# Patient Record
Sex: MALE | Race: WHITE
[De-identification: names, ages, dates, MRNs, and addresses within clinical notes are randomized per-mention and may not be internally consistent; named-entity substitution may affect disease eponyms.]

---

## 2019-03-18 ENCOUNTER — HOSPITAL ENCOUNTER (OUTPATIENT)
Dept: HOSPITAL 8 - RAD | Age: 69
Discharge: HOME | End: 2019-03-18
Attending: OTOLARYNGOLOGY
Payer: MEDICARE

## 2019-03-18 DIAGNOSIS — J32.0: ICD-10-CM

## 2019-03-18 DIAGNOSIS — J32.3: ICD-10-CM

## 2019-03-18 DIAGNOSIS — J32.2: Primary | ICD-10-CM

## 2019-03-18 DIAGNOSIS — J34.2: ICD-10-CM

## 2019-03-18 PROCEDURE — 70486 CT MAXILLOFACIAL W/O DYE: CPT

## 2019-03-19 ENCOUNTER — HOSPITAL ENCOUNTER (OUTPATIENT)
Dept: HOSPITAL 8 - OUT | Age: 69
Discharge: HOME | End: 2019-03-19
Attending: OTOLARYNGOLOGY
Payer: MEDICARE

## 2019-03-19 VITALS — HEIGHT: 75 IN | WEIGHT: 203.71 LBS | BODY MASS INDEX: 25.33 KG/M2

## 2019-03-19 VITALS — SYSTOLIC BLOOD PRESSURE: 114 MMHG | DIASTOLIC BLOOD PRESSURE: 64 MMHG

## 2019-03-19 DIAGNOSIS — J32.2: ICD-10-CM

## 2019-03-19 DIAGNOSIS — G47.33: ICD-10-CM

## 2019-03-19 DIAGNOSIS — Z95.0: ICD-10-CM

## 2019-03-19 DIAGNOSIS — Z72.89: ICD-10-CM

## 2019-03-19 DIAGNOSIS — E03.9: ICD-10-CM

## 2019-03-19 DIAGNOSIS — R04.0: ICD-10-CM

## 2019-03-19 DIAGNOSIS — J34.2: Primary | ICD-10-CM

## 2019-03-19 DIAGNOSIS — Z85.89: ICD-10-CM

## 2019-03-19 DIAGNOSIS — J32.0: ICD-10-CM

## 2019-03-19 PROCEDURE — 88304 TISSUE EXAM BY PATHOLOGIST: CPT

## 2019-03-19 PROCEDURE — 88311 DECALCIFY TISSUE: CPT

## 2019-03-19 PROCEDURE — 93005 ELECTROCARDIOGRAM TRACING: CPT

## 2019-03-19 PROCEDURE — 31256 EXPLORATION MAXILLARY SINUS: CPT

## 2019-03-19 PROCEDURE — 30920 LIGATION UPPER JAW ARTERY: CPT

## 2019-03-19 PROCEDURE — 31241 NSL/SNS NDSC LIG SPHNPTN ART: CPT

## 2019-03-19 PROCEDURE — 30520 REPAIR OF NASAL SEPTUM: CPT

## 2019-03-19 PROCEDURE — 31254 NSL/SINS NDSC W/PRTL ETHMDCT: CPT

## 2019-03-19 RX ADMIN — FENTANYL CITRATE PRN MCG: 50 INJECTION INTRAMUSCULAR; INTRAVENOUS at 12:16

## 2019-03-19 RX ADMIN — FENTANYL CITRATE PRN MCG: 50 INJECTION INTRAMUSCULAR; INTRAVENOUS at 12:31

## 2019-11-25 ENCOUNTER — OFFICE VISIT (OUTPATIENT)
Dept: URGENT CARE | Facility: CLINIC | Age: 69
End: 2019-11-25
Payer: MEDICARE

## 2019-11-25 VITALS
OXYGEN SATURATION: 96 % | RESPIRATION RATE: 16 BRPM | HEART RATE: 77 BPM | SYSTOLIC BLOOD PRESSURE: 112 MMHG | WEIGHT: 214 LBS | DIASTOLIC BLOOD PRESSURE: 62 MMHG | BODY MASS INDEX: 26.61 KG/M2 | TEMPERATURE: 99.5 F | HEIGHT: 75 IN

## 2019-11-25 DIAGNOSIS — Z86.79 HISTORY OF ATRIAL FIBRILLATION: ICD-10-CM

## 2019-11-25 DIAGNOSIS — J40 BRONCHITIS: ICD-10-CM

## 2019-11-25 PROCEDURE — 99203 OFFICE O/P NEW LOW 30 MIN: CPT | Performed by: PHYSICIAN ASSISTANT

## 2019-11-25 RX ORDER — CODEINE PHOSPHATE AND GUAIFENESIN 10; 100 MG/5ML; MG/5ML
5 SOLUTION ORAL NIGHTLY PRN
Qty: 50 ML | Refills: 0 | Status: SHIPPED | OUTPATIENT
Start: 2019-11-25 | End: 2019-12-05

## 2019-11-25 RX ORDER — DOXYCYCLINE HYCLATE 100 MG
100 TABLET ORAL 2 TIMES DAILY
Qty: 14 TAB | Refills: 0 | Status: SHIPPED | OUTPATIENT
Start: 2019-11-25 | End: 2019-12-02

## 2019-11-25 RX ORDER — TEMAZEPAM 30 MG/1
30 CAPSULE ORAL NIGHTLY PRN
COMMUNITY
End: 2020-02-23

## 2019-11-25 RX ORDER — AMIODARONE HYDROCHLORIDE 200 MG/1
TABLET ORAL
Refills: 1 | COMMUNITY
Start: 2019-11-18 | End: 2021-03-17

## 2019-11-25 RX ORDER — FUROSEMIDE 40 MG/1
40 TABLET ORAL DAILY
Refills: 2 | COMMUNITY
Start: 2019-10-21 | End: 2022-09-09 | Stop reason: SDUPTHER

## 2019-11-25 RX ORDER — LEVOTHYROXINE SODIUM 0.07 MG/1
75 TABLET ORAL
COMMUNITY
End: 2022-05-05

## 2019-11-25 RX ORDER — ENALAPRIL MALEATE 2.5 MG/1
2.5 TABLET ORAL DAILY
Refills: 5 | COMMUNITY
Start: 2019-09-15 | End: 2021-08-27

## 2019-11-25 ASSESSMENT — ENCOUNTER SYMPTOMS: COUGH: 1

## 2019-11-25 NOTE — PROGRESS NOTES
"Subjective:      Pavan Cueva is a 69 y.o. male who presents with Cough and Nasal Congestion            Cough   This is a new problem. The current episode started in the past 7 days (5 days ago). The problem has been waxing and waning. The problem occurs every few minutes. The cough is productive of sputum. Associated symptoms include myalgias, nasal congestion, postnasal drip, rhinorrhea, a sore throat and wheezing. Pertinent negatives include no chest pain, chills, ear pain, eye redness or fever. He has tried OTC cough suppressant for the symptoms. The treatment provided mild relief. His past medical history is significant for bronchitis.       Review of Systems   Constitutional: Negative for chills and fever.   HENT: Positive for congestion, postnasal drip, rhinorrhea and sore throat. Negative for ear pain.    Eyes: Negative for discharge and redness.   Respiratory: Positive for cough and wheezing.    Cardiovascular: Negative for chest pain and leg swelling.   Gastrointestinal: Negative for abdominal pain and diarrhea.   Musculoskeletal: Positive for myalgias.   All other systems reviewed and are negative.         Objective:     /62 (BP Location: Right arm, Patient Position: Sitting)   Pulse 77   Temp 37.5 °C (99.5 °F)   Resp 16   Ht 1.905 m (6' 3\")   Wt 97.1 kg (214 lb)   SpO2 96%   BMI 26.75 kg/m²    PMH: hx of bronchitis, a. Fib.   MEDS: Reviewed .   ALLERGIES: No Known Allergies  SURGHX: History reviewed. No pertinent surgical history.  SOCHX:  reports that he has never smoked. He has never used smokeless tobacco. He reports current alcohol use. He reports that he does not use drugs.  FH: Family history was reviewed, no pertinent findings to report    Physical Exam  Vitals signs reviewed.   Constitutional:       Appearance: Normal appearance. He is well-developed.   HENT:      Head: Normocephalic and atraumatic.      Ears:      Comments: Bilateral clear middle ear effusions.     Nose:      " Comments: Rhinorrhea noted.     Mouth/Throat:      Comments: Posterior oropharynx is erythematous, positive postnasal drainage.  No evidence of exudate.  Eyes:      Conjunctiva/sclera: Conjunctivae normal.      Pupils: Pupils are equal, round, and reactive to light.   Neck:      Musculoskeletal: Normal range of motion and neck supple.   Cardiovascular:      Rate and Rhythm: Normal rate and regular rhythm.      Heart sounds: No murmur.   Pulmonary:      Effort: Pulmonary effort is normal. No respiratory distress.      Breath sounds: Normal breath sounds.      Comments: Harsh bronchial cough throughout the duration of the visit.  Musculoskeletal: Normal range of motion.      Right lower leg: No edema.      Left lower leg: No edema.   Lymphadenopathy:      Cervical: No cervical adenopathy.   Skin:     General: Skin is warm.      Findings: No rash.   Neurological:      Mental Status: He is alert and oriented to person, place, and time.   Psychiatric:         Mood and Affect: Mood normal.         Behavior: Behavior normal.         Thought Content: Thought content normal.                 Assessment/Plan:       1. Bronchitis  - doxycycline (VIBRAMYCIN) 100 MG Tab; Take 1 Tab by mouth 2 times a day for 7 days.  Dispense: 14 Tab; Refill: 0  - guaifenesin-codeine (ROBITUSSIN AC) Solution oral solution; Take 5 mL by mouth at bedtime as needed for Cough (May cause sedation) for up to 10 days.  Dispense: 50 mL; Refill: 0    2. History of atrial fibrillation    NARXCHECK was reviewed by myself-  Document does not reveal any concerning patterns. Pt. was advised to avoid the operation of heavy machine along with driving while on such medications. Finally pt. was advised to use medication only as prescribed.   Discussed side effects of the above meds.    Encouraged OTC supportive meds PRN. Humidification, increase fluids, avoid night time dairy.   Patient given precautionary s/sx that mandate immediate follow up and evaluation in  the ED. Advised of risks of not doing so.    DDX, Supportive care, and indications for immediate follow-up discussed with patient.    Instructed to return to clinic or nearest emergency department if we are not available for any change in condition, further concerns, or worsening of symptoms.    The patient demonstrated a good understanding and agreed with the treatment plan.    Please note that this dictation was created using voice recognition software. I have made every reasonable attempt to correct obvious errors, but I expect that there are errors of grammar and possibly content that I did not discover before finalizing the note.

## 2019-12-01 ASSESSMENT — ENCOUNTER SYMPTOMS
EYE DISCHARGE: 0
MYALGIAS: 1
DIARRHEA: 0
CHILLS: 0
RHINORRHEA: 1
ABDOMINAL PAIN: 0
FEVER: 0
EYE REDNESS: 0
SORE THROAT: 1
WHEEZING: 1

## 2020-02-23 ENCOUNTER — OFFICE VISIT (OUTPATIENT)
Dept: URGENT CARE | Facility: CLINIC | Age: 70
End: 2020-02-23
Payer: MEDICARE

## 2020-02-23 VITALS
SYSTOLIC BLOOD PRESSURE: 96 MMHG | HEART RATE: 77 BPM | DIASTOLIC BLOOD PRESSURE: 56 MMHG | WEIGHT: 209 LBS | TEMPERATURE: 98.6 F | OXYGEN SATURATION: 96 % | BODY MASS INDEX: 25.99 KG/M2 | HEIGHT: 75 IN | RESPIRATION RATE: 16 BRPM

## 2020-02-23 DIAGNOSIS — L04.0 ACUTE LYMPHADENITIS OF NECK: ICD-10-CM

## 2020-02-23 PROCEDURE — 99214 OFFICE O/P EST MOD 30 MIN: CPT | Performed by: FAMILY MEDICINE

## 2020-02-23 RX ORDER — AMOXICILLIN AND CLAVULANATE POTASSIUM 875; 125 MG/1; MG/1
TABLET, FILM COATED ORAL
Qty: 20 TAB | Refills: 0 | Status: SHIPPED | OUTPATIENT
Start: 2020-02-23 | End: 2021-03-17

## 2020-02-23 NOTE — PROGRESS NOTES
Chief Complaint:    Chief Complaint   Patient presents with   • Bump     lump on the neck, flaring up at night time started 3 days ago       History of Present Illness:    This is a new problem. He has swollen gland right side of neck, it is in region of submandibular gland, that he noticed on 2/21/2020. He has Amoxil to take 4 pills at a time prior to dental procedure and he took 3 rounds of this and each time he feels the antibiotic significantly helped the swelling go down some. There is no pain in this swollen gland. He is wondering if he should be on a different antibiotic for this problem.      Review of Systems:    Constitutional: Negative for fever, chills, and diaphoresis.   Eyes: Negative for change in vision, photophobia, pain, redness, and discharge.  ENT: Negative for ear pain, ear discharge, hearing loss, tinnitus, nasal congestion, nosebleeds, and sore throat.    Respiratory: Negative for cough, hemoptysis, sputum production, shortness of breath, wheezing, and stridor.    Cardiovascular: Negative for chest pain, palpitations, orthopnea, claudication, leg swelling, and PND.   Gastrointestinal: Negative for abdominal pain, nausea, vomiting, diarrhea, constipation, blood in stool, and melena.   Genitourinary: Negative for dysuria, urinary urgency, urinary frequency, hematuria, and flank pain.   Musculoskeletal: Negative for myalgias, joint pain, neck pain, and back pain.   Skin: Negative for rash and itching.   Neurological: Negative for dizziness, tingling, tremors, sensory change, speech change, focal weakness, seizures, loss of consciousness, and headaches.   Endo: Negative for polydipsia.   Heme: On Eliquis.  Psychiatric/Behavioral: Negative for depression, suicidal ideas, hallucinations, memory loss and substance abuse. The patient is not nervous/anxious and does not have insomnia.        Past Medical History:    History reviewed. No pertinent past medical history.    Past Surgical  History:    History reviewed. No pertinent surgical history.    Social History:    Social History     Socioeconomic History   • Marital status: Single     Spouse name: Not on file   • Number of children: Not on file   • Years of education: Not on file   • Highest education level: Not on file   Occupational History   • Not on file   Social Needs   • Financial resource strain: Not on file   • Food insecurity     Worry: Not on file     Inability: Not on file   • Transportation needs     Medical: Not on file     Non-medical: Not on file   Tobacco Use   • Smoking status: Never Smoker   • Smokeless tobacco: Never Used   Substance and Sexual Activity   • Alcohol use: Yes   • Drug use: Never   • Sexual activity: Not on file   Lifestyle   • Physical activity     Days per week: Not on file     Minutes per session: Not on file   • Stress: Not on file   Relationships   • Social connections     Talks on phone: Not on file     Gets together: Not on file     Attends Orthodox service: Not on file     Active member of club or organization: Not on file     Attends meetings of clubs or organizations: Not on file     Relationship status: Not on file   • Intimate partner violence     Fear of current or ex partner: Not on file     Emotionally abused: Not on file     Physically abused: Not on file     Forced sexual activity: Not on file   Other Topics Concern   • Not on file   Social History Narrative   • Not on file     Family History:    History reviewed. No pertinent family history.    Medications:    Current Outpatient Medications on File Prior to Visit   Medication Sig Dispense Refill   • levothyroxine (SYNTHROID) 50 MCG Tab 1 tab(s)     • enalapril (VASOTEC) 2.5 MG Tab TK 1 T PO QHS  5   • amiodarone (CORDARONE) 200 MG Tab TK 1 T PO ONE TIME D  1   • furosemide (LASIX) 40 MG Tab TK 1 T PO BID  2   • apixaban (ELIQUIS) 5mg Tab 1 tab(s)       No current facility-administered medications on file prior to visit.      Allergies:    No  "Known Allergies      Vitals:    Vitals:    02/23/20 1153   BP: (!) 96/56   Pulse: 77   Resp: 16   Temp: 37 °C (98.6 °F)   SpO2: 96%   Weight: 94.8 kg (209 lb)   Height: 1.905 m (6' 3\")       Physical Exam:    Constitutional: Vital signs reviewed. Appears well-developed and well-nourished. No acute distress.   Eyes: Sclera white, conjunctivae clear.   ENT: External ears normal. External auditory canals normal without discharge. TMs translucent and non-bulging. Hearing normal. Nasal mucosa pink. Lips/teeth are normal. Oral mucosa pink and moist. Posterior pharynx: WNL.  Neck: Neck supple.   Pulmonary/Chest: Respirations non-labored.   Lymph: Right submandibular gland region is moderately enlarged without tenderness to palpation or overlying erythema.  Musculoskeletal: Normal gait. Normal range of motion. No muscular atrophy or weakness.  Neurological: Alert and oriented to person, place, and time. Muscle tone normal. Coordination normal.   Skin: No rashes or lesions. Warm, dry, normal turgor.  Psychiatric: Normal mood and affect. Behavior is normal. Judgment and thought content normal.       Assessment / Plan:    1. Acute lymphadenitis of neck  - amoxicillin-clavulanate (AUGMENTIN) 875-125 MG Tab; 1 TAB BY MOUTH TWICE A DAY X 10 DAYS. TAKE WITH FOOD.  Dispense: 20 Tab; Refill: 0      Discussed with him DDX, management options, and risks, benefits, and alternatives to treatment plan agreed upon.    He feels the Amoxil he has taken as noted in HPI has significantly helped the swelling in right submandibular gland region with each dose, so I will treat this an infection with antibiotic, but have him take Augmentin.    Agreeable to medication prescribed.    BP on lower side today, but he denies lightheadedness or dizziness and has a scheduled appointment with his Cardiologist in a few weeks. Thus will observe BP at this time.    Discussed expected course of duration, time for improvement, and to seek follow-up in " Emergency Room, urgent care, or with PCP if getting worse at any time or not improving within expected time frame.

## 2021-03-17 ENCOUNTER — OFFICE VISIT (OUTPATIENT)
Dept: CARDIOLOGY | Facility: MEDICAL CENTER | Age: 71
End: 2021-03-17
Payer: MEDICARE

## 2021-03-17 ENCOUNTER — TELEPHONE (OUTPATIENT)
Dept: CARDIOLOGY | Facility: MEDICAL CENTER | Age: 71
End: 2021-03-17

## 2021-03-17 VITALS
BODY MASS INDEX: 25.49 KG/M2 | HEART RATE: 123 BPM | RESPIRATION RATE: 14 BRPM | DIASTOLIC BLOOD PRESSURE: 72 MMHG | SYSTOLIC BLOOD PRESSURE: 122 MMHG | WEIGHT: 205 LBS | OXYGEN SATURATION: 97 % | HEIGHT: 75 IN

## 2021-03-17 DIAGNOSIS — I48.19 PERSISTENT ATRIAL FIBRILLATION (HCC): ICD-10-CM

## 2021-03-17 DIAGNOSIS — I48.0 PAF (PAROXYSMAL ATRIAL FIBRILLATION) (HCC): ICD-10-CM

## 2021-03-17 LAB — EKG IMPRESSION: NORMAL

## 2021-03-17 PROCEDURE — 93279 PRGRMG DEV EVAL PM/LDLS PM: CPT | Performed by: INTERNAL MEDICINE

## 2021-03-17 PROCEDURE — 93000 ELECTROCARDIOGRAM COMPLETE: CPT | Mod: 59 | Performed by: INTERNAL MEDICINE

## 2021-03-17 PROCEDURE — 99204 OFFICE O/P NEW MOD 45 MIN: CPT | Mod: 25 | Performed by: INTERNAL MEDICINE

## 2021-03-17 RX ORDER — MAGNESIUM OXIDE 400 MG/1
1 TABLET ORAL DAILY
COMMUNITY

## 2021-03-17 RX ORDER — FERROUS SULFATE 325(65) MG
1 TABLET ORAL
COMMUNITY

## 2021-03-17 RX ORDER — POTASSIUM CHLORIDE 750 MG/1
10 CAPSULE, EXTENDED RELEASE ORAL DAILY
COMMUNITY
Start: 2021-02-24 | End: 2024-01-11 | Stop reason: SDUPTHER

## 2021-03-17 RX ORDER — TRAZODONE HYDROCHLORIDE 50 MG/1
50 TABLET ORAL PRN
COMMUNITY
Start: 2021-01-25 | End: 2023-02-09

## 2021-03-17 RX ORDER — DIPHENOXYLATE HYDROCHLORIDE AND ATROPINE SULFATE 2.5; .025 MG/1; MG/1
1 TABLET ORAL DAILY
COMMUNITY

## 2021-03-17 RX ORDER — METOPROLOL SUCCINATE 50 MG/1
50 TABLET, EXTENDED RELEASE ORAL DAILY
Qty: 90 TABLET | Refills: 3 | Status: SHIPPED | OUTPATIENT
Start: 2021-03-17 | End: 2021-10-12 | Stop reason: SDUPTHER

## 2021-03-17 RX ORDER — METOPROLOL SUCCINATE 50 MG/1
50 TABLET, EXTENDED RELEASE ORAL DAILY
Qty: 30 TABLET | Refills: 5 | Status: SHIPPED | OUTPATIENT
Start: 2021-03-17 | End: 2021-03-17

## 2021-03-17 NOTE — PROGRESS NOTES
Arrhythmia Clinic Note (New Patient)    DOS: 3/17/2021    Referring physician: Dr. Felix    Chief complaint/Reason for consult: Persistent AF    HPI:  Patient is a 70 yo M. He has a history of long standing persistent AF. Failed prior cardioversion. Eventually resulted in LAE and nonischemic cardiomyopathy complicated by valvular heart disease. S/p MVR and MAZE. S/p MDT dual chamber PPM. Subsequently had some recurrence and was placed initially on amiodarone. This was complicated by thyroid issues. Then placed on IC agent by his [facility] cardiologist subsequently told to stop, possibly given his history of structural heart disease. Now back in persistent atypical flutter. Referred for consideration of RF ablation.    ROS (+ in BOLD):  Constitutional: Fevers/chills/fatigue/weightloss  HEENT: Blurry vision/eye pain/sore throat/hearing loss  Respiratory: Shortness of breath/cough  Cardiovascular: Chest pain/palpitations/edema/orthopnea/syncope  GI: Nausea/vomitting/diarrhea  MSK: Arthralgias/myagias/muscle weakness  Skin: Rash/sores  Neurological: Numbness/tremors/vertigo  Endocrine: Excessive thirst/polyuria/cold intolerance/heat intolerance  Psych: Depression/anxiety    PMhx:  Nonischemic cardiomyopathy  Valvular heart disease  Nonobstructive CAD    PSHx:  MVR/MAZE    Social History     Socioeconomic History   • Marital status: Single     Spouse name: Not on file   • Number of children: Not on file   • Years of education: Not on file   • Highest education level: Not on file   Occupational History   • Not on file   Tobacco Use   • Smoking status: Never Smoker   • Smokeless tobacco: Never Used   Substance and Sexual Activity   • Alcohol use: Yes   • Drug use: Never   • Sexual activity: Not on file   Other Topics Concern   • Not on file   Social History Narrative   • Not on file     Social Determinants of Health     Financial Resource Strain:    • Difficulty of Paying Living Expenses:    Food Insecurity:    •  Worried About Running Out of Food in the Last Year:    • Ran Out of Food in the Last Year:    Transportation Needs:    • Lack of Transportation (Medical):    • Lack of Transportation (Non-Medical):    Physical Activity:    • Days of Exercise per Week:    • Minutes of Exercise per Session:    Stress:    • Feeling of Stress :    Social Connections:    • Frequency of Communication with Friends and Family:    • Frequency of Social Gatherings with Friends and Family:    • Attends Yazidism Services:    • Active Member of Clubs or Organizations:    • Attends Club or Organization Meetings:    • Marital Status:    Intimate Partner Violence:    • Fear of Current or Ex-Partner:    • Emotionally Abused:    • Physically Abused:    • Sexually Abused:        No family history on file.    Allergies   Allergen Reactions   • Bumetanide Unspecified       Current Outpatient Medications   Medication Sig Dispense Refill   • OXYGEN-HELIUM INH PM only [facility]     • ferrous sulfate 325 (65 Fe) MG tablet Take 1 tablet by mouth.     • magnesium oxide (MAG-OX) 400 MG Tab tablet Take 1 tablet by mouth every day.     • potassium chloride (MICRO-K) 10 MEQ capsule Take 10 mEq by mouth.     • traZODone (DESYREL) 50 MG Tab Take 50 mg by mouth.     • Multiple Vitamin (MULTI-VITAMINS) Tab Take 1 tablet by mouth every day.     • metoprolol SR (TOPROL XL) 50 MG TABLET SR 24 HR Take 1 tablet by mouth every day. 30 tablet 5   • levothyroxine (SYNTHROID) 50 MCG Tab Take 50 mcg by mouth Every morning on an empty stomach.     • enalapril (VASOTEC) 2.5 MG Tab Take 2.5 mg by mouth every day.  5   • furosemide (LASIX) 40 MG Tab Take 40 mg by mouth every day.  2   • apixaban (ELIQUIS) 5mg Tab 1 tab(s)     • amoxicillin-clavulanate (AUGMENTIN) 875-125 MG Tab 1 TAB BY MOUTH TWICE A DAY X 10 DAYS. TAKE WITH FOOD. (Patient not taking: Reported on 3/17/2021) 20 Tab 0   • amiodarone (CORDARONE) 200 MG Tab TK 1 T PO ONE TIME D  1     No current facility-administered  "medications for this visit.       Physical Exam:  Vitals:    03/17/21 0747   BP: 122/72   BP Location: Right arm   Patient Position: Sitting   BP Cuff Size: Adult   Pulse: (!) 123   Resp: 14   SpO2: 97%   Weight: 93 kg (205 lb)   Height: 1.905 m (6' 3\")     General appearance: NAD, conversant  Eyes: anicteric sclerae, no lid-lag; PERRLA  HENT: Atraumatic; moist mucous membranes, no ulcerations  Neck: Trachea midline; FROM, supple, no thyromegaly  Lungs: CTA, with normal respiratory effort and no intercostal retractions  CV: RRR, tachy, no MRGs, no JVD  Abdomen: Soft, non-tender; normal bowel sounds, no HSM  Extremities: No peripheral edema. No clubbing or cyanosis.  Skin: Normal temperature, turgor and texture; no rash or ulcers  Psych: Appropriate affect, alert and oriented to person, place and time      Data:  Outside records from [facility] reviewed    EKG interpreted by me:  Atypical flutter, mild RVR    Device interrogation reviewed, persistent AFL, device parameters working appropriately    Impression/Plan:  1. PAF (paroxysmal atrial fibrillation) (HCC)  EKG   2. Persistent atrial fibrillation (HCC)  CL-EP ABLATION AFIB-AFLUTTER    EC-LYNETTE W/ CONT     -Longstanding persistent post MAZE referred for ablation  -I discussed chances of success close to 50/50  -However his persistent AF is likely what eventually lead to cardiomyopathy/chamber dilatation and functional valve disorder and may do so again not unreasonable to attempt if he is agreeable, especially as what may be driving this is atypical flutter and mappable  -Risks/benefits discussed and he wants to proceed    Jesus Garza MD            "

## 2021-03-17 NOTE — TELEPHONE ENCOUNTER
----- Message from Jesus Garza M.D. sent at 3/17/2021 12:00 PM PDT -----  Let's schedule fib/flutter. No meds to hold. MDT pacemaker.

## 2021-05-24 ENCOUNTER — PRE-ADMISSION TESTING (OUTPATIENT)
Dept: ADMISSIONS | Facility: MEDICAL CENTER | Age: 71
End: 2021-05-24
Attending: INTERNAL MEDICINE
Payer: MEDICARE

## 2021-05-29 ENCOUNTER — APPOINTMENT (OUTPATIENT)
Dept: ADMISSIONS | Facility: MEDICAL CENTER | Age: 71
End: 2021-05-29
Attending: INTERNAL MEDICINE
Payer: MEDICARE

## 2021-06-01 ENCOUNTER — PRE-ADMISSION TESTING (OUTPATIENT)
Dept: ADMISSIONS | Facility: MEDICAL CENTER | Age: 71
End: 2021-06-01
Attending: INTERNAL MEDICINE
Payer: MEDICARE

## 2021-06-01 ENCOUNTER — HOSPITAL ENCOUNTER (OUTPATIENT)
Dept: LAB | Facility: MEDICAL CENTER | Age: 71
End: 2021-06-01
Attending: INTERNAL MEDICINE
Payer: MEDICARE

## 2021-06-01 DIAGNOSIS — Z01.810 PRE-OPERATIVE CARDIOVASCULAR EXAMINATION: ICD-10-CM

## 2021-06-01 DIAGNOSIS — Z01.812 PRE-OPERATIVE LABORATORY EXAMINATION: ICD-10-CM

## 2021-06-01 LAB
ALBUMIN SERPL BCP-MCNC: 4.3 G/DL (ref 3.2–4.9)
ALBUMIN SERPL BCP-MCNC: 4.5 G/DL (ref 3.2–4.9)
ALBUMIN/GLOB SERPL: 1.4 G/DL
ALBUMIN/GLOB SERPL: 1.6 G/DL
ALP SERPL-CCNC: 59 U/L (ref 30–99)
ALP SERPL-CCNC: 61 U/L (ref 30–99)
ALT SERPL-CCNC: 17 U/L (ref 2–50)
ALT SERPL-CCNC: 20 U/L (ref 2–50)
ANION GAP SERPL CALC-SCNC: 7 MMOL/L (ref 7–16)
ANION GAP SERPL CALC-SCNC: 9 MMOL/L (ref 7–16)
AST SERPL-CCNC: 13 U/L (ref 12–45)
AST SERPL-CCNC: 22 U/L (ref 12–45)
BASOPHILS # BLD AUTO: 0.5 % (ref 0–1.8)
BASOPHILS # BLD AUTO: 0.5 % (ref 0–1.8)
BASOPHILS # BLD: 0.03 K/UL (ref 0–0.12)
BASOPHILS # BLD: 0.03 K/UL (ref 0–0.12)
BILIRUB SERPL-MCNC: 1 MG/DL (ref 0.1–1.5)
BILIRUB SERPL-MCNC: 1 MG/DL (ref 0.1–1.5)
BUN SERPL-MCNC: 22 MG/DL (ref 8–22)
BUN SERPL-MCNC: 23 MG/DL (ref 8–22)
CALCIUM SERPL-MCNC: 9.4 MG/DL (ref 8.5–10.5)
CALCIUM SERPL-MCNC: 9.7 MG/DL (ref 8.5–10.5)
CHLORIDE SERPL-SCNC: 101 MMOL/L (ref 96–112)
CHLORIDE SERPL-SCNC: 102 MMOL/L (ref 96–112)
CHOLEST SERPL-MCNC: 149 MG/DL (ref 100–199)
CO2 SERPL-SCNC: 27 MMOL/L (ref 20–33)
CO2 SERPL-SCNC: 28 MMOL/L (ref 20–33)
CREAT SERPL-MCNC: 0.85 MG/DL (ref 0.5–1.4)
CREAT SERPL-MCNC: 0.95 MG/DL (ref 0.5–1.4)
EKG IMPRESSION: NORMAL
EOSINOPHIL # BLD AUTO: 0.22 K/UL (ref 0–0.51)
EOSINOPHIL # BLD AUTO: 0.22 K/UL (ref 0–0.51)
EOSINOPHIL NFR BLD: 3.8 % (ref 0–6.9)
EOSINOPHIL NFR BLD: 4 % (ref 0–6.9)
ERYTHROCYTE [DISTWIDTH] IN BLOOD BY AUTOMATED COUNT: 46.5 FL (ref 35.9–50)
ERYTHROCYTE [DISTWIDTH] IN BLOOD BY AUTOMATED COUNT: 46.5 FL (ref 35.9–50)
GLOBULIN SER CALC-MCNC: 2.8 G/DL (ref 1.9–3.5)
GLOBULIN SER CALC-MCNC: 3 G/DL (ref 1.9–3.5)
GLUCOSE SERPL-MCNC: 101 MG/DL (ref 65–99)
GLUCOSE SERPL-MCNC: 91 MG/DL (ref 65–99)
HCT VFR BLD AUTO: 40.4 % (ref 42–52)
HCT VFR BLD AUTO: 40.9 % (ref 42–52)
HDLC SERPL-MCNC: 63 MG/DL
HGB BLD-MCNC: 12.7 G/DL (ref 14–18)
HGB BLD-MCNC: 13 G/DL (ref 14–18)
IMM GRANULOCYTES # BLD AUTO: 0.02 K/UL (ref 0–0.11)
IMM GRANULOCYTES # BLD AUTO: 0.02 K/UL (ref 0–0.11)
IMM GRANULOCYTES NFR BLD AUTO: 0.3 % (ref 0–0.9)
IMM GRANULOCYTES NFR BLD AUTO: 0.4 % (ref 0–0.9)
INR PPP: 1.34 (ref 0.87–1.13)
IRON SERPL-MCNC: 135 UG/DL (ref 50–180)
LDLC SERPL CALC-MCNC: 80 MG/DL
LYMPHOCYTES # BLD AUTO: 1.33 K/UL (ref 1–4.8)
LYMPHOCYTES # BLD AUTO: 1.36 K/UL (ref 1–4.8)
LYMPHOCYTES NFR BLD: 23.8 % (ref 22–41)
LYMPHOCYTES NFR BLD: 24.3 % (ref 22–41)
MCH RBC QN AUTO: 29.7 PG (ref 27–33)
MCH RBC QN AUTO: 30.1 PG (ref 27–33)
MCHC RBC AUTO-ENTMCNC: 31.4 G/DL (ref 33.7–35.3)
MCHC RBC AUTO-ENTMCNC: 31.8 G/DL (ref 33.7–35.3)
MCV RBC AUTO: 94.6 FL (ref 81.4–97.8)
MCV RBC AUTO: 94.7 FL (ref 81.4–97.8)
MONOCYTES # BLD AUTO: 0.48 K/UL (ref 0–0.85)
MONOCYTES # BLD AUTO: 0.51 K/UL (ref 0–0.85)
MONOCYTES NFR BLD AUTO: 8.8 % (ref 0–13.4)
MONOCYTES NFR BLD AUTO: 8.9 % (ref 0–13.4)
NEUTROPHILS # BLD AUTO: 3.4 K/UL (ref 1.82–7.42)
NEUTROPHILS # BLD AUTO: 3.58 K/UL (ref 1.82–7.42)
NEUTROPHILS NFR BLD: 62 % (ref 44–72)
NEUTROPHILS NFR BLD: 62.7 % (ref 44–72)
NRBC # BLD AUTO: 0 K/UL
NRBC # BLD AUTO: 0 K/UL
NRBC BLD-RTO: 0 /100 WBC
NRBC BLD-RTO: 0 /100 WBC
PLATELET # BLD AUTO: 176 K/UL (ref 164–446)
PLATELET # BLD AUTO: 182 K/UL (ref 164–446)
PMV BLD AUTO: 10.1 FL (ref 9–12.9)
PMV BLD AUTO: 9.9 FL (ref 9–12.9)
POTASSIUM SERPL-SCNC: 4.7 MMOL/L (ref 3.6–5.5)
POTASSIUM SERPL-SCNC: 5 MMOL/L (ref 3.6–5.5)
PROT SERPL-MCNC: 7.3 G/DL (ref 6–8.2)
PROT SERPL-MCNC: 7.3 G/DL (ref 6–8.2)
PROTHROMBIN TIME: 17 SEC (ref 12–14.6)
PSA SERPL-MCNC: 1.31 NG/ML (ref 0–4)
RBC # BLD AUTO: 4.27 M/UL (ref 4.7–6.1)
RBC # BLD AUTO: 4.32 M/UL (ref 4.7–6.1)
SARS-COV-2 RNA RESP QL NAA+PROBE: NOTDETECTED
SODIUM SERPL-SCNC: 137 MMOL/L (ref 135–145)
SODIUM SERPL-SCNC: 137 MMOL/L (ref 135–145)
SPECIMEN SOURCE: NORMAL
TRIGL SERPL-MCNC: 28 MG/DL (ref 0–149)
WBC # BLD AUTO: 5.5 K/UL (ref 4.8–10.8)
WBC # BLD AUTO: 5.7 K/UL (ref 4.8–10.8)

## 2021-06-01 PROCEDURE — 80053 COMPREHEN METABOLIC PANEL: CPT | Mod: 91

## 2021-06-01 PROCEDURE — 85610 PROTHROMBIN TIME: CPT

## 2021-06-01 PROCEDURE — 80061 LIPID PANEL: CPT

## 2021-06-01 PROCEDURE — 84153 ASSAY OF PSA TOTAL: CPT | Mod: GA

## 2021-06-01 PROCEDURE — 80053 COMPREHEN METABOLIC PANEL: CPT

## 2021-06-01 PROCEDURE — 93005 ELECTROCARDIOGRAM TRACING: CPT

## 2021-06-01 PROCEDURE — 93010 ELECTROCARDIOGRAM REPORT: CPT | Performed by: INTERNAL MEDICINE

## 2021-06-01 PROCEDURE — 85025 COMPLETE CBC W/AUTO DIFF WBC: CPT

## 2021-06-01 PROCEDURE — 83540 ASSAY OF IRON: CPT

## 2021-06-01 PROCEDURE — 85025 COMPLETE CBC W/AUTO DIFF WBC: CPT | Mod: 91

## 2021-06-01 PROCEDURE — U0005 INFEC AGEN DETEC AMPLI PROBE: HCPCS

## 2021-06-01 PROCEDURE — U0003 INFECTIOUS AGENT DETECTION BY NUCLEIC ACID (DNA OR RNA); SEVERE ACUTE RESPIRATORY SYNDROME CORONAVIRUS 2 (SARS-COV-2) (CORONAVIRUS DISEASE [COVID-19]), AMPLIFIED PROBE TECHNIQUE, MAKING USE OF HIGH THROUGHPUT TECHNOLOGIES AS DESCRIBED BY CMS-2020-01-R: HCPCS

## 2021-06-01 PROCEDURE — 36415 COLL VENOUS BLD VENIPUNCTURE: CPT

## 2021-06-01 PROCEDURE — C9803 HOPD COVID-19 SPEC COLLECT: HCPCS

## 2021-06-02 NOTE — OR NURSING
COVID-19 Pre-surgery screenin. Do you have an undiagnosed respiratory illness or symptoms such as coughing or sneezing? NO (Yes/No)  a. Onset of Sx -  b. Acute vs. chronic respiratory illness -    2. Do you have an unexplained fever greater than 100.4 degrees Fahrenheit or 38 degrees Celsius?     NO (Yes/No)    3. Have you had direct exposure to a patient who tested positive for Covid-19?    NO (Yes/No)    4. Have you had any loss of your sense of taste or smell? Have you had N/V or sore throat? NO    Patient has been informed of visitor policy and asked to wear a mask upon entering the hospital   YES (Yes/No)     COVID-19 Pre-surgery screening:    Pt did not answer generic voicemail was left with call back number.

## 2021-06-03 ENCOUNTER — ANESTHESIA EVENT (OUTPATIENT)
Dept: CARDIOLOGY | Facility: MEDICAL CENTER | Age: 71
End: 2021-06-03
Payer: MEDICARE

## 2021-06-03 ENCOUNTER — HOSPITAL ENCOUNTER (OUTPATIENT)
Facility: MEDICAL CENTER | Age: 71
End: 2021-06-03
Attending: INTERNAL MEDICINE
Payer: MEDICARE

## 2021-06-03 ENCOUNTER — APPOINTMENT (OUTPATIENT)
Dept: CARDIOLOGY | Facility: MEDICAL CENTER | Age: 71
End: 2021-06-03
Attending: INTERNAL MEDICINE
Payer: MEDICARE

## 2021-06-03 ENCOUNTER — ANESTHESIA (OUTPATIENT)
Dept: CARDIOLOGY | Facility: MEDICAL CENTER | Age: 71
End: 2021-06-03
Payer: MEDICARE

## 2021-06-03 ENCOUNTER — HOSPITAL ENCOUNTER (OUTPATIENT)
Facility: MEDICAL CENTER | Age: 71
End: 2021-06-03
Attending: INTERNAL MEDICINE | Admitting: INTERNAL MEDICINE
Payer: MEDICARE

## 2021-06-03 VITALS
RESPIRATION RATE: 18 BRPM | SYSTOLIC BLOOD PRESSURE: 118 MMHG | WEIGHT: 202.38 LBS | OXYGEN SATURATION: 98 % | BODY MASS INDEX: 25.16 KG/M2 | TEMPERATURE: 97.3 F | DIASTOLIC BLOOD PRESSURE: 70 MMHG | HEART RATE: 89 BPM | HEIGHT: 75 IN

## 2021-06-03 DIAGNOSIS — I34.0 MITRAL VALVE INSUFFICIENCY, UNSPECIFIED ETIOLOGY: ICD-10-CM

## 2021-06-03 DIAGNOSIS — I48.19 PERSISTENT ATRIAL FIBRILLATION (HCC): ICD-10-CM

## 2021-06-03 LAB
INR PPP: 1.17 (ref 0.87–1.13)
PROTHROMBIN TIME: 15.3 SEC (ref 12–14.6)

## 2021-06-03 PROCEDURE — 700111 HCHG RX REV CODE 636 W/ 250 OVERRIDE (IP): Performed by: ANESTHESIOLOGY

## 2021-06-03 PROCEDURE — 93325 DOPPLER ECHO COLOR FLOW MAPG: CPT

## 2021-06-03 PROCEDURE — 160002 HCHG RECOVERY MINUTES (STAT)

## 2021-06-03 PROCEDURE — 700101 HCHG RX REV CODE 250: Performed by: INTERNAL MEDICINE

## 2021-06-03 PROCEDURE — 93312 ECHO TRANSESOPHAGEAL: CPT | Mod: 26 | Performed by: INTERNAL MEDICINE

## 2021-06-03 PROCEDURE — 85610 PROTHROMBIN TIME: CPT

## 2021-06-03 PROCEDURE — 700101 HCHG RX REV CODE 250: Performed by: ANESTHESIOLOGY

## 2021-06-03 PROCEDURE — A9270 NON-COVERED ITEM OR SERVICE: HCPCS | Performed by: INTERNAL MEDICINE

## 2021-06-03 PROCEDURE — 4410588 CL-EP ABLATION AFIB-AFLUTTER: Mod: 74

## 2021-06-03 PROCEDURE — 700105 HCHG RX REV CODE 258: Performed by: INTERNAL MEDICINE

## 2021-06-03 PROCEDURE — 82274 ASSAY TEST FOR BLOOD FECAL: CPT

## 2021-06-03 PROCEDURE — 700111 HCHG RX REV CODE 636 W/ 250 OVERRIDE (IP)

## 2021-06-03 RX ORDER — MEPERIDINE HYDROCHLORIDE 25 MG/ML
12.5 INJECTION INTRAMUSCULAR; INTRAVENOUS; SUBCUTANEOUS
Status: DISCONTINUED | OUTPATIENT
Start: 2021-06-03 | End: 2021-06-03 | Stop reason: HOSPADM

## 2021-06-03 RX ORDER — HALOPERIDOL 5 MG/ML
1 INJECTION INTRAMUSCULAR
Status: DISCONTINUED | OUTPATIENT
Start: 2021-06-03 | End: 2021-06-03 | Stop reason: HOSPADM

## 2021-06-03 RX ORDER — HEPARIN SODIUM 200 [USP'U]/100ML
INJECTION, SOLUTION INTRAVENOUS
Status: COMPLETED
Start: 2021-06-03 | End: 2021-06-03

## 2021-06-03 RX ORDER — ONDANSETRON 2 MG/ML
INJECTION INTRAMUSCULAR; INTRAVENOUS PRN
Status: DISCONTINUED | OUTPATIENT
Start: 2021-06-03 | End: 2021-06-03 | Stop reason: HOSPADM

## 2021-06-03 RX ORDER — SODIUM CHLORIDE, SODIUM LACTATE, POTASSIUM CHLORIDE, CALCIUM CHLORIDE 600; 310; 30; 20 MG/100ML; MG/100ML; MG/100ML; MG/100ML
INJECTION, SOLUTION INTRAVENOUS CONTINUOUS
Status: ACTIVE | OUTPATIENT
Start: 2021-06-03 | End: 2021-06-03

## 2021-06-03 RX ORDER — MIDAZOLAM HYDROCHLORIDE 1 MG/ML
1 INJECTION INTRAMUSCULAR; INTRAVENOUS
Status: DISCONTINUED | OUTPATIENT
Start: 2021-06-03 | End: 2021-06-03 | Stop reason: HOSPADM

## 2021-06-03 RX ORDER — DIPHENHYDRAMINE HYDROCHLORIDE 50 MG/ML
12.5 INJECTION INTRAMUSCULAR; INTRAVENOUS
Status: DISCONTINUED | OUTPATIENT
Start: 2021-06-03 | End: 2021-06-03 | Stop reason: HOSPADM

## 2021-06-03 RX ORDER — OXYCODONE HYDROCHLORIDE AND ACETAMINOPHEN 5; 325 MG/1; MG/1
1 TABLET ORAL
Status: DISCONTINUED | OUTPATIENT
Start: 2021-06-03 | End: 2021-06-03 | Stop reason: HOSPADM

## 2021-06-03 RX ORDER — LIDOCAINE HYDROCHLORIDE 20 MG/ML
INJECTION, SOLUTION EPIDURAL; INFILTRATION; INTRACAUDAL; PERINEURAL PRN
Status: DISCONTINUED | OUTPATIENT
Start: 2021-06-03 | End: 2021-06-03 | Stop reason: SURG

## 2021-06-03 RX ORDER — CEFAZOLIN SODIUM 1 G/3ML
INJECTION, POWDER, FOR SOLUTION INTRAMUSCULAR; INTRAVENOUS PRN
Status: DISCONTINUED | OUTPATIENT
Start: 2021-06-03 | End: 2021-06-03 | Stop reason: SURG

## 2021-06-03 RX ORDER — SODIUM CHLORIDE, SODIUM LACTATE, POTASSIUM CHLORIDE, CALCIUM CHLORIDE 600; 310; 30; 20 MG/100ML; MG/100ML; MG/100ML; MG/100ML
INJECTION, SOLUTION INTRAVENOUS CONTINUOUS
Status: DISCONTINUED | OUTPATIENT
Start: 2021-06-03 | End: 2021-06-03 | Stop reason: HOSPADM

## 2021-06-03 RX ORDER — OXYCODONE HYDROCHLORIDE AND ACETAMINOPHEN 5; 325 MG/1; MG/1
2 TABLET ORAL
Status: DISCONTINUED | OUTPATIENT
Start: 2021-06-03 | End: 2021-06-03 | Stop reason: HOSPADM

## 2021-06-03 RX ORDER — HEPARIN SODIUM 1000 [USP'U]/ML
INJECTION, SOLUTION INTRAVENOUS; SUBCUTANEOUS
Status: COMPLETED
Start: 2021-06-03 | End: 2021-06-03

## 2021-06-03 RX ORDER — ONDANSETRON 2 MG/ML
4 INJECTION INTRAMUSCULAR; INTRAVENOUS
Status: DISCONTINUED | OUTPATIENT
Start: 2021-06-03 | End: 2021-06-03 | Stop reason: HOSPADM

## 2021-06-03 RX ADMIN — CEFAZOLIN 2 G: 330 INJECTION, POWDER, FOR SOLUTION INTRAMUSCULAR; INTRAVENOUS at 13:31

## 2021-06-03 RX ADMIN — LIDOCAINE HYDROCHLORIDE 50 MG: 20 INJECTION, SOLUTION EPIDURAL; INFILTRATION; INTRACAUDAL at 13:31

## 2021-06-03 RX ADMIN — EPHEDRINE SULFATE 25 MG: 50 INJECTION INTRAMUSCULAR; INTRAVENOUS; SUBCUTANEOUS at 13:31

## 2021-06-03 RX ADMIN — Medication 100 MG: at 13:31

## 2021-06-03 RX ADMIN — PROPOFOL 200 MG: 10 INJECTION, EMULSION INTRAVENOUS at 13:31

## 2021-06-03 RX ADMIN — ONDANSETRON 4 MG: 2 INJECTION INTRAMUSCULAR; INTRAVENOUS at 13:31

## 2021-06-03 RX ADMIN — POVIDONE IODINE 15 ML: 100 SOLUTION TOPICAL at 12:15

## 2021-06-03 RX ADMIN — SODIUM CHLORIDE, POTASSIUM CHLORIDE, SODIUM LACTATE AND CALCIUM CHLORIDE: 600; 310; 30; 20 INJECTION, SOLUTION INTRAVENOUS at 12:15

## 2021-06-03 ASSESSMENT — FIBROSIS 4 INDEX: FIB4 SCORE: 1.956559480312315984

## 2021-06-03 ASSESSMENT — PAIN SCALES - GENERAL: PAIN_LEVEL: 0

## 2021-06-03 NOTE — H&P
"EP Pre-procedure History and Physical    Date of service: 6/3/2021    Reason for visit/Chief complaint: AF    HPI:   Patient is a 72 yo M. History of AF s/p prior MAZE. Subsequent AF and atypical flutter. Symptomatic. Here for RF ablation.    Past Medical History:   Diagnosis Date   • Heart valve disease    • Hemorrhagic disorder (HCC)     epistaxis   • Hypertension    • Pacemaker    • Sleep apnea     no cpap, 3L O2     Past Surgical History:   Procedure Laterality Date   • MAZE PROCEDURE     • MITRAL VALVE REPAIR     • OTHER      Nasal surgery for nosebleeds   • TRICUSPID VALVE REPAIR       History reviewed. No pertinent family history.    Physical Exam:  Vitals:    05/24/21 0904 06/03/21 1122   BP:  115/70   Pulse:  88   Resp:  12   Temp:  36.6 °C (97.8 °F)   TempSrc:  Temporal   SpO2:  99%   Weight: 94.7 kg (208 lb 12.4 oz) 91.8 kg (202 lb 6.1 oz)   Height: 1.905 m (6' 3\") 1.905 m (6' 3\")     Gen: NAD, conversant  HEENT: PERRL, EOMI  LUNGS: CTA B, no w/r/r  CV: RRR, no m/r/g, no JVD  Abd: Soft, NT/ND, +BS  Ext: no edema, warm and well perfused    Labs reviewed    EKG interpreted by me:  AF    Impression/Recs:  1. Persistent atrial fibrillation (HCC)  CL-EP ABLATION AFIB-AFLUTTER    CL-EP ABLATION AFIB-AFLUTTER   -Risks/benefits/alternatives discussed  -All questions answered  -Proceed with procedure    Jesus Garza MD    "

## 2021-06-03 NOTE — PROGRESS NOTES
EP Progress Note    Pre-procedure LYNETTE showing moderate to severe MR and at least partially floating mitral valve ring in the LA. After discussion with CT surgery will hold off on AF ablation and refer for evaluation.    Jesus Garza MD

## 2021-06-03 NOTE — ANESTHESIA TIME REPORT
Anesthesia Start and Stop Event Times     Date Time Event    6/3/2021 1230 Ready for Procedure     1330 Anesthesia Start     1447 Anesthesia Stop        Responsible Staff  06/03/21    Name Role Begin End    Reymundo Montes De Oca M.D. Anesth 1330 1447        Preop Diagnosis (Free Text):  Pre-op Diagnosis             Preop Diagnosis (Codes):    Post op Diagnosis  Atrial fibrillation (HCC)      Premium Reason  Non-Premium    Comments:

## 2021-06-03 NOTE — ANESTHESIA PROCEDURE NOTES
Airway    Date/Time: 6/3/2021 1:31 PM  Performed by: Reymundo Montes De Oca M.D.  Authorized by: Reymundo Montes De Oca M.D.     Location:  OR  Urgency:  Elective  Indications for Airway Management:  Anesthesia      Spontaneous Ventilation: absent    Sedation Level:  Deep  Preoxygenated: Yes    Patient Position:  Sniffing  Final Airway Type:  Endotracheal airway  Final Endotracheal Airway:  ETT  Cuffed: Yes    Technique Used for Successful ETT Placement:  Direct laryngoscopy    Insertion Site:  Oral  Blade Type:  Marichuy  Laryngoscope Blade/Videolaryngoscope Blade Size:  4  ETT Size (mm):  7.0  Measured from:  Teeth  ETT to Teeth (cm):  22  Placement Verified by: auscultation and capnometry    Cormack-Lehane Classification:  Grade I - full view of glottis  Number of Attempts at Approach:  1

## 2021-06-03 NOTE — OR NURSING
1442 Patient back from cath lab. Reportedly LYNETTE done, A.fib procedure cancelled. Patient fully awake. No complains of pain or nausea. Vital signs stable.   1549 Family at the bed side updated on plan of care.   1502 MARIAN ramos at the bedside talking to patient and family.  1509 Dr jimenes at the bedside talking to patient and family.   1611 Criteria met to discharge patient home.  1621 Discharge instructions given to patient, patient verbalize understanding of the orders, reviewed activity, worsening symptoms/managment. Follow up, medications.   1625 Patient escorted via w/c with all his personal belongings.

## 2021-06-03 NOTE — ANESTHESIA POSTPROCEDURE EVALUATION
Patient: Pavan Cueva    Procedure Summary     Date: 06/03/21 Room / Location: [facility] IMAGING - CATH LAB [facility]; [facility] - ECHOCARDIOLOGY [facility]    Anesthesia Start: 1330 Anesthesia Stop:     Procedures:       EC-LYNETTE W/O CONT      CL-EP ABLATION AFIB-AFLUTTER Diagnosis:       Persistent atrial fibrillation (HCC)      Other persistent atrial fibrillation (HCC)      (See Associated Dx)    Scheduled Providers: Jesus Garza M.D.; Reymundo Montes De Oca M.D. Responsible Provider: Reymundo Montes De Oca M.D.    Anesthesia Type: general ASA Status: 3          Final Anesthesia Type: general  Last vitals  BP   Blood Pressure : 115/70    Temp   36.6 °C (97.8 °F)    Pulse   88   Resp   12    SpO2   99 %      Anesthesia Post Evaluation    Patient location during evaluation: PACU  Patient participation: complete - patient participated  Level of consciousness: awake and alert  Pain score: 0    Airway patency: patent  Anesthetic complications: no  Cardiovascular status: hemodynamically stable  Respiratory status: acceptable  Hydration status: euvolemic    PONV: none          No complications documented.

## 2021-06-03 NOTE — DISCHARGE INSTRUCTIONS
ACTIVITY: Rest and take it easy for the first 24 hours.  A responsible adult is recommended to remain with you during that time.  It is normal to feel sleepy.  We encourage you to not do anything that requires balance, judgment or coordination.    MILD FLU-LIKE SYMPTOMS ARE NORMAL. YOU MAY EXPERIENCE GENERALIZED MUSCLE ACHES, THROAT IRRITATION, HEADACHE AND/OR SOME NAUSEA.    FOR 24 HOURS DO NOT:  Drive, operate machinery or run household appliances.  Drink beer or alcoholic beverages.   Make important decisions or sign legal documents.    SPECIAL INSTRUCTIONS: Follow up with your cardiologist call find out when to follow up [phone]    DIET: To avoid nausea, slowly advance diet as tolerated, avoiding spicy or greasy foods for the first day.  Add more substantial food to your diet according to your physician's instructions.  Babies can be fed formula or breast milk as soon as they are hungry.  INCREASE FLUIDS AND FIBER TO AVOID CONSTIPATION.    FOLLOW-UP APPOINTMENT:  A follow-up appointment should be arranged with your doctor in [phone]; call to schedule.    You should CALL YOUR PHYSICIAN if you develop:  Fever greater than 101 degrees F.  Pain not relieved by medication, or persistent nausea or vomiting.  Excessive bleeding (blood soaking through dressing) or unexpected drainage from the wound.  Extreme redness or swelling around the incision site, drainage of pus or foul smelling drainage.  Inability to urinate or empty your bladder within 8 hours.  Problems with breathing or chest pain.    You should call 911 if you develop problems with breathing or chest pain.  If you are unable to contact your doctor or surgical center, you should go to the nearest emergency room or urgent care center.  Physician's telephone #: [phone]    If any questions arise, call your doctor.  If your doctor is not available, please feel free to call the Surgical Center at [phone] The Contact Center is open Monday through Friday  7AM to 5PM and may speak to a nurse at [phone], or toll free at [phone].     A registered nurse may call you a few days after your surgery to see how you are doing after your procedure.    MEDICATIONS: Resume taking daily medication.  Take prescribed pain medication with food.  If no medication is prescribed, you may take non-aspirin pain medication if needed.  PAIN MEDICATION CAN BE VERY CONSTIPATING.  Take a stool softener or laxative such as senokot, pericolace, or milk of magnesia if needed.  Transesophageal Echocardiogram    Transesophageal echocardiogram (LYNETTE) is a test that uses sound waves (echocardiogram) to produce very clear, detailed images of the heart. LYNETTE is done by passing a flexible tube down the esophagus. The heart is located in front of the esophagus.  LYNETTE may be done:  · To check how well your heart valves are working.  · To check for any abnormal growth or tumor  · To look for blood clots  · To check for infection of the lining of the heart (endocarditis).  · To evaluate the dividing wall (septum) of the heart and check for a hole that did not close after birth (patent foramen ovale or atrial septal defect).  · To help diagnose a tear in the wall of the blood vessels (aortic dissection).  · To look at the heart shape, size, and function. Any changes can be associated with certain conditions, including heart failure, aneurysm, and coronary heart disease, CAD.  · During cardiac valve surgery. This allows the surgeon to assess the valve repair before closing the chest.  · During a variety of other cardiac procedures to guide positioning of catheters.  · To monitor your heart's response to IV fluids or medicine.  LYNETTE is usually not a painful procedure. You may feel the probe press against the back of the throat. The probe does not enter the trachea and does not affect your breathing.  Tell a health care provider about:  · Any allergies you have.  · All medicines you are taking,  including vitamins, herbs, eye drops, creams, and over-the-counter medicines.  · Any problems you or family members have had with anesthetic medicines.  · Any blood disorders you have.  · Any surgeries you have had.  · Any medical conditions you have.  · Any swallowing difficulties.  · Whether you have or have had a blockage of the esophagus (esophageal obstruction).  · Whether you are pregnant or may be pregnant.  What are the risks?  Generally, this is a safe procedure. However, problems may occur, including:  · Damage to other structures or organs.  · A tear of the esophagus (esophageal rupture).  · Irregular heart beat (arrhythmia).  · Hoarse voice or difficulty swallowing.  · Bleeding (hemorrhage).  What happens before the procedure?  Staying hydrated  Follow instructions from your health care provider about hydration, which may include:  · Up to 3 hours before the procedure - you may continue to drink clear liquids, such as water, clear fruit juice, black coffee, and plain tea.  Eating and drinking  Follow instructions from your health care provider about eating and drinking, which may include:  · 8 hours before the procedure - stop eating heavy meals or foods such as meat, fried foods, or fatty foods.  · 6 hours before the procedure - stop eating light meals or foods, such as toast or cereal.  · 6 hours before the procedure - stop drinking milk or drinks that contain milk.  · 3 hours before the procedure - stop drinking clear liquids.  General instructions  · You will need to remove any dentures or dental retainers.  · Plan to have someone take you home from the hospital or clinic.  · Plan to have a responsible adult care for you for at least 24 hours after you leave the hospital or clinic. This is important.  · Ask your health care provider about:  ? Changing or stopping your normal medicines. This is important if you take diabetes medicines or blood thinners.  ? Taking over-the-counter medicines, vitamins,  herbs, and supplements.  ? Taking medicines such as aspirin and ibuprofen. These medicines can thin your blood. Do not take these medicines unless your health care provider tells you to take them.  What happens during the procedure?  · To reduce your risk of infection:  ? Your health care team will wash or sanitize their hands.  ? Hair may be removed from the surgical area.  ? Your skin will be washed with soap.  · An IV will be inserted into one of your veins.  · You will be given one or both of the following:  ? A medicine to help you relax (sedative).  ? A medicine to be sprayed or gargled in order to numb the back of your throat (local anesthetic).  · Your blood pressure, heart rate, and breathing (vital signs) will be monitored during the procedure.  · You may be asked to lay on your left side.  · A bite block will be placed in your mouth to keep you from biting the tube during the procedure.  · The tip of the LYNETTE probe will be placed into the back of your mouth. You will be asked to swallow. This helps to pass the tip of the probe into the esophagus.  · Once the tip of the probe is in the correct area, your health care provider will take pictures of the heart.  · The probe and bite block will be removed when the procedure is done.  The procedure may vary among health care providers and hospitals  What happens after the procedure?  · Your blood pressure, heart rate, breathing rate, and blood oxygen level will be monitored until the medicines you were given have worn off.  · When you first wake up, your throat may feel slightly sore and will probably still feel numb. This will improve slowly over time. You will not be allowed to eat or drink until the numbness has gone away.  · Do not drive for 24 hours if you received a sedative.  Summary  · Transesophageal echocardiogram (LYNETTE) is a test that uses sound waves (echocardiogram) to produce very clear, detailed images of the heart.  · LYNETTE is done by passing a  flexible tube down the esophagus.  · Generally, this is a safe procedure. However, problems may occur, including damage to other structures or organs, bleeding, irregular heart beat, and a hoarse voice or trouble swallowing.  · Tell your health care provider about any medicines and medical conditions you may have, as some conditions may increase your risk of complications.  This information is not intended to replace advice given to you by your health care provider. Make sure you discuss any questions you have with your health care provider.  Document Released: 03/09/2004 Document Revised: 05/29/2018 Document Reviewed: 03/16/2018  [employer] Patient Education © 2020 [employer]      If your physician has prescribed pain medication that includes Acetaminophen (Tylenol), do not take additional Acetaminophen (Tylenol) while taking the prescribed medication.    Depression / Suicide Risk    As you are discharged from this [facility] facility, it is important to learn how to keep safe from harming yourself.    Recognize the warning signs:  · Abrupt changes in personality, positive or negative- including increase in energy   · Giving away possessions  · Change in eating patterns- significant weight changes-  positive or negative  · Change in sleeping patterns- unable to sleep or sleeping all the time   · Unwillingness or inability to communicate  · Depression  · Unusual sadness, discouragement and loneliness  · Talk of wanting to die  · Neglect of personal appearance   · Rebelliousness- reckless behavior  · Withdrawal from people/activities they love  · Confusion- inability to concentrate     If you or a loved one observes any of these behaviors or has concerns about self-harm, here's what you can do:  · Talk about it- your feelings and reasons for harming yourself  · Remove any means that you might use to hurt yourself (examples: pills, rope, extension cords, firearm)  · Get professional help from the community (Mental  Health, Substance Abuse, psychological counseling)  · Do not be alone:Call your Safe Contact- someone whom you trust who will be there for you.  · Call your local CRISIS HOTLINE 821-9758 or 966-660-7620  · Call your local Children's Mobile Crisis Response Team [place] (775) 262-3765 or www.[employer] Mobile  · Call the toll free National Suicide Prevention Hotlines   · National Suicide Prevention Lifeline 849-205-IWUN (0181)  · National Hope Line Network 800-SUICIDE (857-4709)

## 2021-06-03 NOTE — ANESTHESIA PREPROCEDURE EVALUATION
Relevant Problems   No relevant active problems       Physical Exam    Airway   Mallampati: II  TM distance: >3 FB  Neck ROM: full       Cardiovascular - normal exam  Rhythm: regular  Rate: normal  (-) murmur     Dental - normal exam           Pulmonary - normal exam  Breath sounds clear to auscultation     Abdominal    Neurological - normal exam                 Anesthesia Plan    ASA 3   ASA physical status 3 criteria: implanted pacemaker    Plan - general       Airway plan will be ETT          Induction: intravenous    Postoperative Plan: Postoperative administration of opioids is intended.    Pertinent diagnostic labs and testing reviewed    Informed Consent:    Anesthetic plan and risks discussed with patient.    Use of blood products discussed with: patient whom consented to blood products.

## 2021-06-04 ENCOUNTER — TELEPHONE (OUTPATIENT)
Dept: CARDIOLOGY | Facility: MEDICAL CENTER | Age: 71
End: 2021-06-04

## 2021-06-04 NOTE — TELEPHONE ENCOUNTER
Dr. Garza or Rosanna,    I recvd a call from this patient. He needs some clarification from you about how urgent the surgery is the he needs to have on his heart. Can one of you please call him to further discuss the urgency of the heart procedure? He's worried about his 50th wedding anniversary coming up/.    Thank You,  Reva

## 2021-06-04 NOTE — TELEPHONE ENCOUNTER
SS    Patient stated they had a procedure that did not go well and the Dr advised a surgeon but they were out of it right after the procedure so they would like to go over what is needed next. Pt can be reached at [phone].    Thank you,  Nelida RODRIGUEZ

## 2021-06-07 NOTE — TELEPHONE ENCOUNTER
Pt returning call regarding procedure urgency question and states that he would like to plan something special for his wife for there anniversary but doesn't know what the plan is for procedure. Pt would like a call back at [phone].     Thank you.

## 2021-06-07 NOTE — TELEPHONE ENCOUNTER
S/w pt, he is concerned about his mitral valve. Advised there was a stat referral placed. S/w CTS and they will follow up to see how soon they can get pt in.

## 2021-06-08 ENCOUNTER — TELEPHONE (OUTPATIENT)
Dept: CARDIOTHORACIC SURGERY | Facility: MEDICAL CENTER | Age: 71
End: 2021-06-08

## 2021-06-08 DIAGNOSIS — I34.0 MITRAL VALVE INSUFFICIENCY, UNSPECIFIED ETIOLOGY: ICD-10-CM

## 2021-06-08 DIAGNOSIS — I48.0 PAF (PAROXYSMAL ATRIAL FIBRILLATION) (HCC): ICD-10-CM

## 2021-06-08 NOTE — PROGRESS NOTES
REFERRING PHYSICIAN: Jesus Garza MD.    CONSULTING PHYSICIAN: Omar Loyd MD, FACS.    CHIEF COMPLAINT: Palpitations.    HISTORY OF PRESENT ILLNESS: The patient is a 71 y.o. male with history of  chronic systolic and diastolic heart failure, dilated cardiomyopathy, s/p mitral and tricuspid repair, Maze and bi-atrial resizing by Dr. Chou at [facility] 5/2018,  hypothyroidism, ARIANA intolerant to CPAP, restrictive lung disease, renal mass, chronic fatigue, iron deficiency anemia, hypertension, persistent atrial fibrillation and moderate mitral regurgitation. Today, he states he has occasional palpitations, nothing makes them better or worse. He denies shortness of breath, chest pain, lower extremity edema, dizziness, syncope, orthopnea, or PND. He remains active at work and can walk one mile before having to so and rest.    PAST MEDICAL HISTORY:   Active Ambulatory Problems     Diagnosis Date Noted   • No Active Ambulatory Problems     Resolved Ambulatory Problems     Diagnosis Date Noted   • No Resolved Ambulatory Problems     Past Medical History:   Diagnosis Date   • Heart valve disease    • Hemorrhagic disorder (HCC)    • Hypertension    • Pacemaker    • Sleep apnea      PAST SURGICAL HISTORY:   Past Surgical History:   Procedure Laterality Date   • MAZE PROCEDURE     • MITRAL VALVE REPAIR     • OTHER      Nasal surgery for nosebleeds   • TRICUSPID VALVE REPAIR       ALLERGIES:   Allergies   Allergen Reactions   • Bumetanide Unspecified     CURRENT MEDICATIONS:   Current Outpatient Medications:   •  OXYGEN-HELIUM INH, PM only [place], Disp: , Rfl:   •  ferrous sulfate 325 (65 Fe) MG tablet, Take 1 tablet by mouth., Disp: , Rfl:   •  magnesium oxide (MAG-OX) 400 MG Tab tablet, Take 1 tablet by mouth every day., Disp: , Rfl:   •  potassium chloride (MICRO-K) 10 MEQ capsule, Take 10 mEq by mouth., Disp: , Rfl:   •  traZODone (DESYREL) 50 MG Tab, Take 50 mg by mouth., Disp: , Rfl:   •  Multiple Vitamin  (MULTI-VITAMINS) Tab, Take 1 tablet by mouth every day., Disp: , Rfl:   •  metoprolol SR (TOPROL XL) 50 MG TABLET SR 24 HR, TAKE 1 TABLET BY MOUTH EVERY DAY, Disp: 90 tablet, Rfl: 3  •  levothyroxine (SYNTHROID) 75 MCG Tab, Take 75 mcg by mouth every morning on an empty stomach., Disp: , Rfl:   •  enalapril (VASOTEC) 2.5 MG Tab, Take 2.5 mg by mouth every day., Disp: , Rfl: 5  •  furosemide (LASIX) 40 MG Tab, Take 40 mg by mouth every day., Disp: , Rfl: 2  •  apixaban (ELIQUIS) 5mg Tab, 1 tab(s), Disp: , Rfl:     FAMILY HISTORY: No family history on file.     SOCIAL HISTORY:   Social History     Socioeconomic History   • Marital status:      Spouse name: Not on file   • Number of children: Not on file   • Years of education: Not on file   • Highest education level: Not on file   Occupational History   • Not on file   Tobacco Use   • Smoking status: Former Smoker     Packs/day: 0.00     Quit date:      Years since quittin.4   • Smokeless tobacco: Never Used   Vaping Use   • Vaping Use: Never used   Substance and Sexual Activity   • Alcohol use: Not Currently   • Drug use: Not Currently   • Sexual activity: Not on file   Other Topics Concern   • Not on file   Social History Narrative   • Not on file     Social Determinants of Health     Financial Resource Strain:    • Difficulty of Paying Living Expenses:    Food Insecurity:    • Worried About Running Out of Food in the Last Year:    • Ran Out of Food in the Last Year:    Transportation Needs:    • Lack of Transportation (Medical):    • Lack of Transportation (Non-Medical):    Physical Activity:    • Days of Exercise per Week:    • Minutes of Exercise per Session:    Stress:    • Feeling of Stress :    Social Connections:    • Frequency of Communication with Friends and Family:    • Frequency of Social Gatherings with Friends and Family:    • Attends Taoism Services:    • Active Member of Clubs or Organizations:    • Attends Club or Organization  "Meetings:    • Marital Status:    Intimate Partner Violence:    • Fear of Current or Ex-Partner:    • Emotionally Abused:    • Physically Abused:    • Sexually Abused:      Review of Systems   Constitutional: Negative.    HENT: Negative.    Eyes: Negative.    Respiratory: Negative.    Cardiovascular: Positive for palpitations.   Gastrointestinal: Negative.    Genitourinary: Negative.    Musculoskeletal: Negative.    Skin: Negative.    Neurological: Negative.    Endo/Heme/Allergies: Negative.    Psychiatric/Behavioral: Negative.      PHYSICAL EXAMINATION:    /52 (BP Location: Left arm, Patient Position: Sitting, BP Cuff Size: Adult)   Pulse (!) 117   Temp 36.9 °C (98.4 °F) (Temporal)   Ht 1.905 m (6' 3\")   Wt 94 kg (207 lb 2 oz)   SpO2 95%   BMI 25.89 kg/m².    Physical Exam  Constitutional:       General: He is not in acute distress.  HENT:      Head: Normocephalic.   Eyes:      Pupils: Pupils are equal, round, and reactive to light.   Cardiovascular:      Rate and Rhythm: Normal rate and regular rhythm.      Heart sounds: Murmur heard.   Systolic murmur is present with a grade of 1/6.   No gallop.    Pulmonary:      Effort: Pulmonary effort is normal. No respiratory distress.      Breath sounds: Normal breath sounds. No wheezing or rales.   Abdominal:      General: Bowel sounds are normal. There is no distension.      Palpations: Abdomen is soft.      Tenderness: There is no abdominal tenderness.   Musculoskeletal:         General: Normal range of motion.      Cervical back: Neck supple.   Skin:     General: Skin is warm and dry.   Neurological:      Mental Status: He is alert and oriented to person, place, and time.   Psychiatric:         Mood and Affect: Mood and affect normal.         Cognition and Memory: Memory normal.         Judgment: Judgment normal.       LABS REVIEWED:  Lab Results   Component Value Date/Time    SODIUM 137 06/01/2021 08:39 AM    POTASSIUM 4.7 06/01/2021 08:39 AM    CHLORIDE " 102 06/01/2021 08:39 AM    CO2 28 06/01/2021 08:39 AM    GLUCOSE 91 06/01/2021 08:39 AM    BUN 22 06/01/2021 08:39 AM    CREATININE 0.85 06/01/2021 08:39 AM      Lab Results   Component Value Date/Time    PROTHROMBTM 15.3 (H) 06/03/2021 11:57 AM    INR 1.17 (H) 06/03/2021 11:57 AM      Lab Results   Component Value Date/Time    WBC 5.7 06/01/2021 08:39 AM    RBC 4.27 (L) 06/01/2021 08:39 AM    HEMOGLOBIN 12.7 (L) 06/01/2021 08:39 AM    HEMATOCRIT 40.4 (L) 06/01/2021 08:39 AM    MCV 94.6 06/01/2021 08:39 AM    MCH 29.7 06/01/2021 08:39 AM    MCHC 31.4 (L) 06/01/2021 08:39 AM    MPV 10.1 06/01/2021 08:39 AM    NEUTSPOLYS 62.70 06/01/2021 08:39 AM    LYMPHOCYTES 23.80 06/01/2021 08:39 AM    MONOCYTES 8.90 06/01/2021 08:39 AM    EOSINOPHILS 3.80 06/01/2021 08:39 AM    BASOPHILS 0.50 06/01/2021 08:39 AM        IMAGING REVIEWED AND INTERPRETED:    TRANSESOPHAGEAL ECHOCARDIOGRAM [facility] [place] 6/3/2021:   Moderate depressed LV systolic function around 30-35%.   There is moderate to severe MR    It appears that a portion of the MV annulopasty ring is detached from the   annulus and is floating within the LA just above the valve plane. Some   of the MR flow is coming around the outside of the ring on color   doppler. Appears also to be the case on 3D images. AF ablation was   deferred after discussion with CT surgery and potential risk of further   dislodgement of ring.    ANGIOGRAM:  [facility]: mild disease, EF 50%, 4+ MR, 2+ AR 02/2018 (from Dr. Felix note do not know where this was done and did not have films)    IMPRESSION:  Mitral ring partial dehiscence, status post mitral and tricuspid valve repairs and maze procedure, permanent atrial fibrillation.          PLAN:  There is partial dehiscence of the mitral valve annuloplasty ring and moderate residual mitral regurgitation.  I doubt there's a need for reoperation at this time.  I will order a CTA chest to take another look at the orientation of the MV ring.  The patient was instructed  to contact Dr. Joseph Chou at [facility] for an opinion since his original operation had a very complicated postoperative course.  I'll have the patient return to my clinic in approximately 2 weeks.    Findings and recommendations have been discussed with the patient’s cardiologist, Freya Garza MD.  Thank you for this very challenging consultation and participation in the patient’s care.  I will keep you apprised of all future developments.    Sincerely,    Omar Loyd MD, FACS.

## 2021-06-09 ENCOUNTER — OFFICE VISIT (OUTPATIENT)
Dept: CARDIOTHORACIC SURGERY | Facility: MEDICAL CENTER | Age: 71
End: 2021-06-09
Payer: MEDICARE

## 2021-06-09 VITALS
DIASTOLIC BLOOD PRESSURE: 52 MMHG | TEMPERATURE: 98.4 F | HEIGHT: 75 IN | HEART RATE: 117 BPM | BODY MASS INDEX: 25.75 KG/M2 | SYSTOLIC BLOOD PRESSURE: 100 MMHG | OXYGEN SATURATION: 95 % | WEIGHT: 207.13 LBS

## 2021-06-09 DIAGNOSIS — I34.0 SEVERE MITRAL REGURGITATION: ICD-10-CM

## 2021-06-09 PROCEDURE — 99205 OFFICE O/P NEW HI 60 MIN: CPT | Performed by: THORACIC SURGERY (CARDIOTHORACIC VASCULAR SURGERY)

## 2021-06-09 ASSESSMENT — ENCOUNTER SYMPTOMS
CONSTITUTIONAL NEGATIVE: 1
NEUROLOGICAL NEGATIVE: 1
EYES NEGATIVE: 1
GASTROINTESTINAL NEGATIVE: 1
PSYCHIATRIC NEGATIVE: 1
PALPITATIONS: 1
MUSCULOSKELETAL NEGATIVE: 1
RESPIRATORY NEGATIVE: 1

## 2021-06-09 ASSESSMENT — FIBROSIS 4 INDEX: FIB4 SCORE: 1.956559480312315984

## 2021-06-11 LAB — IMM ASSAY OCC BLD FITOB: NEGATIVE

## 2021-06-17 ENCOUNTER — HOSPITAL ENCOUNTER (OUTPATIENT)
Dept: RADIOLOGY | Facility: MEDICAL CENTER | Age: 71
End: 2021-06-17
Attending: NURSE PRACTITIONER
Payer: MEDICARE

## 2021-06-17 DIAGNOSIS — I34.0 SEVERE MITRAL REGURGITATION: ICD-10-CM

## 2021-06-17 PROCEDURE — 700117 HCHG RX CONTRAST REV CODE 255: Performed by: NURSE PRACTITIONER

## 2021-06-17 PROCEDURE — 71275 CT ANGIOGRAPHY CHEST: CPT | Mod: MG

## 2021-06-17 RX ADMIN — IOHEXOL 100 ML: 350 INJECTION, SOLUTION INTRAVENOUS at 08:10

## 2021-06-25 ENCOUNTER — TELEPHONE (OUTPATIENT)
Dept: CARDIOLOGY | Facility: MEDICAL CENTER | Age: 71
End: 2021-06-25

## 2021-06-25 NOTE — TELEPHONE ENCOUNTER
All records that have been requested have been faxed to fax number provided, except the ECHO as we do not have one in patient chart.   Attn: Yesenia Chou  [phone]    Fax confirmation sent to scan.

## 2021-06-25 NOTE — TELEPHONE ENCOUNTER
PATTY Chou with [facility] in [place], she called to get records on patient as they did valve procedure on patient. They need SS last note, LYNETTE, Echo, CTA, and if he had a CATH done faxed to [phone]. If you have any questions please call [phone]. She would like to get fax today if possible.    Thank you,    Becca GOLDMAN

## 2021-07-01 ENCOUNTER — OFFICE VISIT (OUTPATIENT)
Dept: CARDIOLOGY | Facility: MEDICAL CENTER | Age: 71
End: 2021-07-01
Payer: MEDICARE

## 2021-07-01 ENCOUNTER — NON-PROVIDER VISIT (OUTPATIENT)
Dept: CARDIOLOGY | Facility: MEDICAL CENTER | Age: 71
End: 2021-07-01
Payer: MEDICARE

## 2021-07-01 VITALS
HEIGHT: 75 IN | HEART RATE: 78 BPM | DIASTOLIC BLOOD PRESSURE: 68 MMHG | SYSTOLIC BLOOD PRESSURE: 110 MMHG | BODY MASS INDEX: 25.61 KG/M2 | RESPIRATION RATE: 18 BRPM | WEIGHT: 206 LBS | OXYGEN SATURATION: 94 %

## 2021-07-01 DIAGNOSIS — I34.0 MITRAL VALVE INSUFFICIENCY, UNSPECIFIED ETIOLOGY: ICD-10-CM

## 2021-07-01 DIAGNOSIS — I48.19 PERSISTENT ATRIAL FIBRILLATION (HCC): ICD-10-CM

## 2021-07-01 DIAGNOSIS — Z95.0 CARDIAC PACEMAKER IN SITU: ICD-10-CM

## 2021-07-01 DIAGNOSIS — Z95.0 PACEMAKER: ICD-10-CM

## 2021-07-01 DIAGNOSIS — I48.0 PAF (PAROXYSMAL ATRIAL FIBRILLATION) (HCC): ICD-10-CM

## 2021-07-01 DIAGNOSIS — I49.5 SINUS NODE DYSFUNCTION (HCC): ICD-10-CM

## 2021-07-01 DIAGNOSIS — Z79.01 ANTICOAGULATED: ICD-10-CM

## 2021-07-01 LAB — EKG IMPRESSION: NORMAL

## 2021-07-01 PROCEDURE — 93000 ELECTROCARDIOGRAM COMPLETE: CPT | Performed by: INTERNAL MEDICINE

## 2021-07-01 PROCEDURE — 93280 PM DEVICE PROGR EVAL DUAL: CPT | Performed by: INTERNAL MEDICINE

## 2021-07-01 PROCEDURE — 99214 OFFICE O/P EST MOD 30 MIN: CPT | Performed by: NURSE PRACTITIONER

## 2021-07-01 ASSESSMENT — ENCOUNTER SYMPTOMS
WEIGHT LOSS: 0
PND: 0
ORTHOPNEA: 0
WEAKNESS: 0
PALPITATIONS: 1
DIZZINESS: 0
CLAUDICATION: 0
SHORTNESS OF BREATH: 0

## 2021-07-01 ASSESSMENT — FIBROSIS 4 INDEX: FIB4 SCORE: 1.956559480312315984

## 2021-07-01 NOTE — PROGRESS NOTES
Chief Complaint   Patient presents with   • Atrial Fibrillation     F/V Dx: PAF        Subjective:   Pavan Cueva is a 71 y.o. male who presents today     Patient was seen by cardiac surgery Dr. Loyd on 6/9/2021 for consultation due to mitral ring partial dehiscence that was observed during LYNETTE on 6/3/2021 prior to scheduled AF ablation. A CTA of his chest was ordered to take another look at the orientation of the MV ring. Patient was also instructed to contact Dr. Joseph Chou at [facility] for an opinion since his original operated was completed there and patient had a very complicated postoperative course. FU back in 2 weeks was recommended.     PMH pertinent forchronic systolic and diastolic heart failure, dilated cardiomyopathy, s/p mitral and tricuspid repair, Maze and bi-atrial resizing by Dr. Chou at [facility] 5/2018,  MDT PPM, hypothyroidism, ARIANA intolerant to CPAP, restrictive lung disease, renal mass, chronic fatigue, iron deficiency anemia, hypertension, persistent atrial fibrillation and moderate mitral regurgitation.    Today patient states that he is feeling overall well. Primary concern is the plan of care regarding his mitral valve. States that he called Dr. Chou's office and was told that they were unable to say much until they received records from [facility]. Patient denies having any cardiovascular complaints such as chest pain, dizziness, syncope, edema or severe palpitations. Occasionally he does feel his AF, this is not too bothersome for him.     Past Medical History:   Diagnosis Date   • AF (atrial fibrillation) (HCC)     atrial fibrillation   • Anemia    • Arrhythmia     Bi-Ventricular cardiac pacemaker   • Cardiomyopathy, dilated (HCC)    • Heart valve disease    • Hemorrhagic disorder (HCC)     epistaxis   • Hypertension    • ARIANA (obstructive sleep apnea)    • Pacemaker    • Renal mass    • Restrictive lung disease    • Sleep apnea     no cpap, 3L O2   • Thyroid  disease      Past Surgical History:   Procedure Laterality Date   • MAZE PROCEDURE     • MITRAL VALVE REPAIR     • OTHER      Nasal surgery for nosebleeds   • TRICUSPID VALVE REPAIR       Family History   Problem Relation Age of Onset   • Cancer Mother    • Stroke Father    • Heart Disease Father      Social History     Socioeconomic History   • Marital status:      Spouse name: Not on file   • Number of children: Not on file   • Years of education: Not on file   • Highest education level: Not on file   Occupational History   • Not on file   Tobacco Use   • Smoking status: Former Smoker     Packs/day: 0.00     Quit date:      Years since quittin.5   • Smokeless tobacco: Never Used   Vaping Use   • Vaping Use: Never used   Substance and Sexual Activity   • Alcohol use: Yes     Comment: 2/week   • Drug use: Not Currently   • Sexual activity: Not on file   Other Topics Concern   • Not on file   Social History Narrative   • Not on file     Social Determinants of Health     Financial Resource Strain:    • Difficulty of Paying Living Expenses:    Food Insecurity:    • Worried About Running Out of Food in the Last Year:    • Ran Out of Food in the Last Year:    Transportation Needs:    • Lack of Transportation (Medical):    • Lack of Transportation (Non-Medical):    Physical Activity:    • Days of Exercise per Week:    • Minutes of Exercise per Session:    Stress:    • Feeling of Stress :    Social Connections:    • Frequency of Communication with Friends and Family:    • Frequency of Social Gatherings with Friends and Family:    • Attends Zoroastrianism Services:    • Active Member of Clubs or Organizations:    • Attends Club or Organization Meetings:    • Marital Status:    Intimate Partner Violence:    • Fear of Current or Ex-Partner:    • Emotionally Abused:    • Physically Abused:    • Sexually Abused:      No Known Allergies  Outpatient Encounter Medications as of 2021   Medication Sig Dispense Refill  "  • Probiotic Product (PROBIOTIC-10) Chew Tab Chew.     • ferrous sulfate 325 (65 Fe) MG tablet Take 1 tablet by mouth.     • magnesium oxide (MAG-OX) 400 MG Tab tablet Take 1 tablet by mouth every day.     • potassium chloride (MICRO-K) 10 MEQ capsule Take 10 mEq by mouth.     • traZODone (DESYREL) 50 MG Tab Take 50 mg by mouth.     • Multiple Vitamin (MULTI-VITAMINS) Tab Take 1 tablet by mouth every day.     • metoprolol SR (TOPROL XL) 50 MG TABLET SR 24 HR TAKE 1 TABLET BY MOUTH EVERY DAY 90 tablet 3   • levothyroxine (SYNTHROID) 75 MCG Tab Take 75 mcg by mouth every morning on an empty stomach.     • enalapril (VASOTEC) 2.5 MG Tab Take 2.5 mg by mouth every day.  5   • furosemide (LASIX) 40 MG Tab Take 40 mg by mouth every day.  2   • apixaban (ELIQUIS) 5mg Tab 1 tab(s)     • [DISCONTINUED] OXYGEN-HELIUM INH PM only [facility]       No facility-administered encounter medications on file as of 7/1/2021.     Review of Systems   Constitutional: Negative for malaise/fatigue and weight loss.   Respiratory: Negative for shortness of breath.    Cardiovascular: Positive for palpitations (Occasionally will feel his heart fibrillate ). Negative for chest pain, orthopnea, claudication, leg swelling and PND.   Neurological: Negative for dizziness and weakness.   All other systems reviewed and are negative.       Objective:   /68 (BP Location: Left arm, Patient Position: Sitting, BP Cuff Size: Adult)   Pulse 78   Resp 18   Ht 1.905 m (6' 3\")   Wt 93.4 kg (206 lb)   SpO2 94%   BMI 25.75 kg/m²     Physical Exam   Constitutional: He is oriented to person, place, and time. He appears well-developed. No distress.   HENT:   Head: Normocephalic.   Neck: No JVD present. Carotid bruit is not present.   Cardiovascular: Normal rate, regular rhythm and normal pulses.   Murmur heard.   Systolic murmur is present with a grade of 1/6.  Pulmonary/Chest: Effort normal and breath sounds normal.   Abdominal: Soft. He exhibits no " distension.   Musculoskeletal:         General: Normal range of motion.      Cervical back: Normal range of motion.      Right lower leg: No edema.      Left lower leg: No edema.   Neurological: He is alert and oriented to person, place, and time.   Skin: Skin is warm and dry.   Psychiatric: His behavior is normal. Mood and thought content normal.       Assessment:     1. PAF (paroxysmal atrial fibrillation) ([facility])  EKG   2. Mitral valve insufficiency, unspecified etiology     3. Persistent atrial fibrillation (HCC)     4. Anticoagulated     5. Pacemaker         Medical Decision Making:  Today's Assessment / Status / Plan:     Persistent A-Fib:  - H/O MAZE.   - Failed prior DCCV.   - Resulting in LAE and NICM.    - PPM interrogation showing 92.9% burden.   - Rate improved with the addition of Toprol XL 50 mg daily, will continue.   - OAC with Eliquis 5 mg BID, continues to tolerate well.   - At this time ablation is on hold pending Dr. Chou's recommendations, patient is also understandably not sure he wants to pursue ablation at all anymore. Patient has been in persistent atrial fibrillation for approximately 17 years which does reduce the rate of success for RF ablation. Patient is also not symptomatic with his AF and does not feel that it is limiting his quality of life or physical ability in any way. We are happy to see patient back if RF ablation is deemed safe by CTS and patient wishes to have the procedure completed in the future.     MDT PPM:  - Placed by Dr. Gillette on 7/13/2018 due to sinus node dysfunction.  - Device interrogation today showed normal device function with battery longevity of 10.5 years.     S/P MVR with partial ring dehiscence:  - Called Dr. Chou's office and spoke with the MA for his nurse Yesenia who stated that they have received the records that were faxed to the office on 6/25/2021 and that they are planning on calling the patient tomorrow (7/2/2021) with Dr. Chou's  recommendations.     Patient would like to establish with Dr. Nathalia Huynh as below for his general cardiology care in the future. Encouraged patient to contact our office should any questions or concerns arise in the mean time. Patient understands and agrees with the plan of care.       Future Appointments   Date Time Provider Department Center   8/27/2021  8:15 AM Nathalia Huynh M.D. [facility] None

## 2021-08-20 NOTE — PROGRESS NOTES
Subjective:   Chief Complaint:   Chief Complaint   Patient presents with   • Atrial Fibrillation     F/V Dx: PAF (paroxysmal atrial fibrillation) (HCC)     This evaluation was conducted via Zoom using secure and encrypted videoconferencing technology. The patient was in a private location in the [facility].    The patient's identity was confirmed and verbal consent was obtained for this virtual visit.      Pavan Cueva is a 71 y.o. male who returns for extremely complex heart care.    Previously followed at [facility] cardiology.    Hear care includes MR/MVR with partial ring dehiscence, PEA cardiac arrest, PM for sick sinus syndrome, persistent AF, atrial flutter chronic anticoagulation, iron deficiency anemia, dilated cardiomyopathy, chronic HFrEF, nonobstructive coronary.    Started with afib with Erik lo, eventual chronic afib.  Then MVR, LHC, LYNETTE, second opinion at [facility], sent for repair.  Had mitral valve repair, tricuspid valve repair, Maze procedure, biatrial resizing performed by Dr. Chou at [facility] in [place] May 2018.  Complicated by bilateral pleural effusion, thoracentesis both sides.   of pericardial effusion at [facility], had CPR, saved him, took 18 pounds of water off.  Now has partial ring dehiscence with moderate to severe MR, seen in consultation by Dr. Omar Loyd on 2021 who recommended surveillance at this time.    Sees Dr. Garza, EP for persistent AF, and atrial flutter.  Referred for consideration of ablation, no plans for ablation until he has his valve fixed.  On anticoagulation for chads 2 vascular score of at least 2, no prior stroke or TIA.  Did also have a maze procedure in 2018 and I am uncertain of left atrial appendage approach.  On apixaban for [id number] of     Has a [employer]tronic dual-chamber pacemaker for sick sinus syndrome.  Persistent afib.  A paced 6.3%, V paced 34%, in afib 93%.  On BB    Has dilated cardiomyopathy, EF 30-35% by LYNETTE.  No recent TTE,  going for surgery.  On metoprolol, lisinopril, furosemide.    Has chronic HFrEF, NHYC 1 COLON.    Prior nonobstructive coronary disease from heart catheterization circa 2018.    Prior ARIANA, intolerant to CPAP.    Has restrictive lung disease,    Has a renal mass, no CKD, being followed clinically for mass.    Chronic iron deficiency anemia, hbg 12.7    Horrible epistaxis, required extensive surgery.    No prior hypertension.  No prior hyperlipidemia. LDL 80  No family history of premature coronary artery disease.  Father had CVA at 85, afib.  Quit smoking in early 30s.  No history of diabetes.  No history of autoimmune disease such as lupus or rheumatoid arthritis.  No ETOH overuse.  No caffeine overuse.  No recreation substance use.  No hx asthma.    Not limited by chest pain, pressure or tightness with activity.   No significant dyspnea on exertion, orthopnea or lower extremity swelling.   No significant palpitations, lightheadedness, or presyncope/syncope.   No symptoms of leg claudication.   No stroke/TIA like symptoms.     Wife is Nikki.  Lives in [place].    DATA REVIEWED by me:  ECG (my personal interpretation) 7-1-21  Afib, 88 NS IVCD, NS ST changes    PM check 7-1-21  A paced 6.3%, V paced 34%, in afib 93%.    LYNETTE 6/3/2021  Pre-operative LYNETTE prior to AF ablation. Moderate depressed LV systolic   function around 30-35%. There is moderate to severe MR by LYNETTE. It   appears that a portion of the MV annulopasty ring is detached from the   annulus and is floating within the LA just above the valve plane. Some   of the MR flow is coming around the outside of the ring on color   doppler. Appears also to be the case on 3D images. AF ablation was   deferred after discussion with CT surgery and potential risk of further   dislodgement of ring.    ANGIOGRAM:  [facility]: mild disease, EF 50%, 4+ MR, 2+ AR 02/2018 (from Dr. Felix note do not know where this was done and did not have films)    Most recent labs:       Lab Results    Component Value Date/Time    WBC 5.7 06/01/2021 08:39 AM    HEMOGLOBIN 12.7 (L) 06/01/2021 08:39 AM    HEMATOCRIT 40.4 (L) 06/01/2021 08:39 AM    MCV 94.6 06/01/2021 08:39 AM    INR 1.17 (H) 06/03/2021 11:57 AM      Lab Results   Component Value Date/Time    SODIUM 137 06/01/2021 08:39 AM    POTASSIUM 4.7 06/01/2021 08:39 AM    CHLORIDE 102 06/01/2021 08:39 AM    CO2 28 06/01/2021 08:39 AM    GLUCOSE 91 06/01/2021 08:39 AM    BUN 22 06/01/2021 08:39 AM    CREATININE 0.85 06/01/2021 08:39 AM      Lab Results   Component Value Date/Time    ASTSGOT 22 06/01/2021 08:39 AM    ALTSGPT 20 06/01/2021 08:39 AM    ALBUMIN 4.3 06/01/2021 08:39 AM      Lab Results   Component Value Date/Time    CHOLSTRLTOT 149 06/01/2021 08:39 AM    LDL 80 06/01/2021 08:39 AM    HDL 63 06/01/2021 08:39 AM    TRIGLYCERIDE 28 06/01/2021 08:39 AM     No results for input(s): NTPROBNP, TROPONINT in the last 72 hours.      Past Medical History:   Diagnosis Date   • AF (atrial fibrillation) ([facility])     atrial fibrillation   • Anemia    • Arrhythmia     Bi-Ventricular cardiac pacemaker   • Cardiomyopathy, dilated (HCC)    • Heart valve disease    • Hemorrhagic disorder ([facility])     epistaxis   • Hypertension    • ARIANA (obstructive sleep apnea)    • Pacemaker    • Renal mass    • Restrictive lung disease    • Sleep apnea     no cpap, 3L O2   • Thyroid disease      Past Surgical History:   Procedure Laterality Date   • MAZE PROCEDURE     • MITRAL VALVE REPAIR     • OTHER      Nasal surgery for nosebleeds   • TRICUSPID VALVE REPAIR       Family History   Problem Relation Age of Onset   • Cancer Mother    • Stroke Father         CVA at 85, afib   • Heart Disease Father      Social History     Socioeconomic History   • Marital status:      Spouse name: Not on file   • Number of children: Not on file   • Years of education: Not on file   • Highest education level: Not on file   Occupational History   • Not on file   Tobacco Use   • Smoking status: Former  Smoker     Packs/day: 0.00     Quit date:      Years since quittin.6   • Smokeless tobacco: Never Used   Vaping Use   • Vaping Use: Never used   Substance and Sexual Activity   • Alcohol use: Yes     Comment: 2/week   • Drug use: Not Currently   • Sexual activity: Not on file   Other Topics Concern   • Not on file   Social History Narrative   • Not on file     Social Determinants of Health     Financial Resource Strain:    • Difficulty of Paying Living Expenses:    Food Insecurity:    • Worried About Running Out of Food in the Last Year:    • Ran Out of Food in the Last Year:    Transportation Needs:    • Lack of Transportation (Medical):    • Lack of Transportation (Non-Medical):    Physical Activity:    • Days of Exercise per Week:    • Minutes of Exercise per Session:    Stress:    • Feeling of Stress :    Social Connections:    • Frequency of Communication with Friends and Family:    • Frequency of Social Gatherings with Friends and Family:    • Attends Jehovah's witness Services:    • Active Member of Clubs or Organizations:    • Attends Club or Organization Meetings:    • Marital Status:    Intimate Partner Violence:    • Fear of Current or Ex-Partner:    • Emotionally Abused:    • Physically Abused:    • Sexually Abused:      No Known Allergies    Current Outpatient Medications   Medication Sig Dispense Refill   • LISINOPRIL PO 2.5 mg.     • Probiotic Product (PROBIOTIC-10) Chew Tab Chew.     • ferrous sulfate 325 (65 Fe) MG tablet Take 1 tablet by mouth.     • magnesium oxide (MAG-OX) 400 MG Tab tablet Take 1 tablet by mouth every day.     • potassium chloride (MICRO-K) 10 MEQ capsule Take 10 mEq by mouth.     • traZODone (DESYREL) 50 MG Tab Take 50 mg by mouth.     • Multiple Vitamin (MULTI-VITAMINS) Tab Take 1 tablet by mouth every day.     • metoprolol SR (TOPROL XL) 50 MG TABLET SR 24 HR TAKE 1 TABLET BY MOUTH EVERY DAY 90 tablet 3   • levothyroxine (SYNTHROID) 75 MCG Tab Take 75 mcg by mouth every  "morning on an empty stomach.     • furosemide (LASIX) 40 MG Tab Take 40 mg by mouth every day.  2   • apixaban (ELIQUIS) 5mg Tab Take 5 mg by mouth 2 times a day.       No current facility-administered medications for this visit.       ROS  All others systems reviewed and negative.     Objective:     /66 (BP Location: Left arm, Patient Position: Sitting, BP Cuff Size: Adult)   Pulse 87   Ht 1.905 m (6' 3\")   Wt 93 kg (205 lb)   SpO2 96%  Body mass index is 25.62 kg/m².    Vitals obtained by patient:    Physical Exam:  General: No acute distress. Well nourished.   HEENT: EOM grossly intact, no scleral icterus, no pharyngeal erythema.  Phonation normal.  Neck:  No JVD noted at 90 degrees, trachea midline, no obvious masses, moves freely without pain.  CVS: Pulse as reported by patient, no visible LE edema.  Resp: Unlabored respiratory effort, no cough or audible wheeze  MSK/Ext: No clubbing or cyanosis visible appreciated.  Skin: No rashes in visible areas.  Neurological: CN III-XII grossly intact. No focal deficits.   Psych: A&O x 3, appropriate affect, good judgement, well groomed      Assessment:     1. PAF (paroxysmal atrial fibrillation) (HCC)     2. H/O tricuspid valve repair     3. H/O mitral valve repair     4. Prosthetic cardiac valve dehiscence, subsequent encounter     5. Cardiac pacemaker in situ     6. Sinus node dysfunction (HCC)     7. Mitral valve insufficiency, unspecified etiology     8. Persistent atrial fibrillation (HCC)     9. Anticoagulated         Medical Decision Making:  Today's Assessment / Status / Plan:     -Very complicated situation  -Prior PEA cardiac arrest sounds like it was related to pericardial effusion with tamponade  -Planning for surgery 9-8-2021 with Dr. Joseph Chou [facility], plan to remove apparatus related to prior repair, tissue replacement with hopes for any further complications can be treated with transcatheter mitral valve repair  -He is not aware LYNETTE said EF " 30-35%, pt not aware, no recent TTE but he is on BB, ACE-I  -Has been on lasix for years  -Tricuspid valve repair appears to be doing well  -Knows to [facility] dental work every 6 months with antibiotics prior  -Non obstructive CAD  -Might want to go for ablation after MV replacement, defer to Dr. Garza  -[id number] is 3, no prior TIA/CVA, on apixaban  -No statin for primary prevention for nonobstructive disease  -Unsure if he had a left heart catheterization recently emergency, I will check for this  -RTC 2 months APN after surgery MD 4 months    Written instructions given today:  -It was very nice to chat with you today.  We will definitely be able to provide your care including pacemaker checks in [place].  My nurse practitioners Nelida Barr.  She will help care for you until I can see you in person in [place].      Return in about 2 months (around 10/27/2021).    It is my pleasure to participate in the care of Mr. Cueva.  Please do not hesitate to contact me with questions or concerns.    Nathalia Huynh MD, [facility]  Cardiologist [facility]    Please note that this dictation was created using voice recognition software. I have made every reasonable attempt to correct obvious errors, but it is possible there are errors of grammar and possibly content that I did not discover before finalizing the note.

## 2021-08-27 ENCOUNTER — TELEMEDICINE (OUTPATIENT)
Dept: CARDIOLOGY | Facility: MEDICAL CENTER | Age: 71
End: 2021-08-27
Payer: MEDICARE

## 2021-08-27 VITALS
HEART RATE: 87 BPM | DIASTOLIC BLOOD PRESSURE: 66 MMHG | SYSTOLIC BLOOD PRESSURE: 114 MMHG | BODY MASS INDEX: 25.49 KG/M2 | OXYGEN SATURATION: 96 % | HEIGHT: 75 IN | WEIGHT: 205 LBS

## 2021-08-27 DIAGNOSIS — Z79.01 ANTICOAGULATED: ICD-10-CM

## 2021-08-27 DIAGNOSIS — I49.5 SINUS NODE DYSFUNCTION (HCC): ICD-10-CM

## 2021-08-27 DIAGNOSIS — Z98.890 H/O TRICUSPID VALVE REPAIR: ICD-10-CM

## 2021-08-27 DIAGNOSIS — I48.0 PAF (PAROXYSMAL ATRIAL FIBRILLATION) (HCC): ICD-10-CM

## 2021-08-27 DIAGNOSIS — I48.19 PERSISTENT ATRIAL FIBRILLATION (HCC): ICD-10-CM

## 2021-08-27 DIAGNOSIS — I34.0 MITRAL VALVE INSUFFICIENCY, UNSPECIFIED ETIOLOGY: ICD-10-CM

## 2021-08-27 DIAGNOSIS — T82.897D: ICD-10-CM

## 2021-08-27 DIAGNOSIS — Z98.890 H/O MITRAL VALVE REPAIR: ICD-10-CM

## 2021-08-27 DIAGNOSIS — Z95.0 CARDIAC PACEMAKER IN SITU: ICD-10-CM

## 2021-08-27 PROCEDURE — 99214 OFFICE O/P EST MOD 30 MIN: CPT | Mod: 95 | Performed by: INTERNAL MEDICINE

## 2021-08-27 RX ORDER — POTASSIUM CHLORIDE 750 MG/1
20 CAPSULE, EXTENDED RELEASE ORAL
COMMUNITY
Start: 2021-07-30 | End: 2021-08-27

## 2021-08-27 ASSESSMENT — FIBROSIS 4 INDEX: FIB4 SCORE: 1.956559480312315984

## 2021-08-27 NOTE — LETTER
[facility]   [address]   [address]  [place] [address]  Phone: [phone]  Fax: [phone]              Pavan Cueva  1950    Encounter Date: 2021    Nathalia Huynh M.D.          PROGRESS NOTE:  Subjective:   Chief Complaint:   Chief Complaint   Patient presents with   • Atrial Fibrillation     F/V Dx: PAF (paroxysmal atrial fibrillation) (HCC)     This evaluation was conducted via Zoom using secure and encrypted videoconferencing technology. The patient was in a private location in the [facility].    The patient's identity was confirmed and verbal consent was obtained for this virtual visit.      Pavan Cueva is a 71 y.o. male who returns for extremely complex heart care.    Previously followed at [facility]ology.    Hear care includes MR/MVR with partial ring dehiscence, PEA cardiac arrest, PM for sick sinus syndrome, persistent AF, atrial flutter chronic anticoagulation, iron deficiency anemia, dilated cardiomyopathy, chronic HFrEF, nonobstructive coronary.    Started with afib with Erik lo, eventual chronic afib.  Then MVR, LHC, LYNETTE, second opinion at [facility], sent for repair.  Had mitral valve repair, tricuspid valve repair, Maze procedure, biatrial resizing performed by Dr. Chou at [facility] in [place] May 2018.  Complicated by bilateral pleural effusion, thoracentesis both sides.   of pericardial effusion at [facility], had CPR, saved him, took 18 pounds of water off.  Now has partial ring dehiscence with moderate to severe MR, seen in consultation by Dr. Omar Loyd on 2021 who recommended surveillance at this time.    Sees Dr. Garza, EP for persistent AF, and atrial flutter.  Referred for consideration of ablation, no plans for ablation until he has his valve fixed.  On anticoagulation for chads 2 vascular score of at least 2, no prior stroke or TIA.  Did also have a maze procedure in  2018 and I am uncertain of left atrial appendage approach.  On apixaban for [id number] of     Has a Medtronic dual-chamber pacemaker for sick sinus syndrome.  Persistent afib.  A paced 6.3%, V paced 34%, in afib 93%.  On BB    Has dilated cardiomyopathy, EF 30-35% by LYNETTE.  No recent TTE, going for surgery.  On metoprolol, lisinopril, furosemide.    Has chronic HFrEF, NHYC 1 COLON.    Prior nonobstructive coronary disease from heart catheterization circa 2018.    Prior ARIANA, intolerant to CPAP.    Has restrictive lung disease,    Has a renal mass, no CKD, being followed clinically for mass.    Chronic iron deficiency anemia, hbg 12.7    Horrible epistaxis, required extensive surgery.    No prior hypertension.  No prior hyperlipidemia. LDL 80  No family history of premature coronary artery disease.  Father had CVA at 85, afib.  Quit smoking in early 30s.  No history of diabetes.  No history of autoimmune disease such as lupus or rheumatoid arthritis.  No ETOH overuse.  No caffeine overuse.  No recreation substance use.  No hx asthma.    Not limited by chest pain, pressure or tightness with activity.   No significant dyspnea on exertion, orthopnea or lower extremity swelling.   No significant palpitations, lightheadedness, or presyncope/syncope.   No symptoms of leg claudication.   No stroke/TIA like symptoms.     Wife is Nikki.  Lives in [place].    DATA REVIEWED by me:  ECG (my personal interpretation) 7-1-21  Afib, 88 NS IVCD, NS ST changes    PM check 7-1-21  A paced 6.3%, V paced 34%, in afib 93%.    LYNETTE 6/3/2021  Pre-operative LYNETTE prior to AF ablation. Moderate depressed LV systolic   function around 30-35%. There is moderate to severe MR by LYNETTE. It   appears that a portion of the MV annulopasty ring is detached from the   annulus and is floating within the LA just above the valve plane. Some   of the MR flow is coming around the outside of the ring on color   doppler. Appears also to be the case on 3D images. AF  ablation was   deferred after discussion with CT surgery and potential risk of further   dislodgement of ring.    ANGIOGRAM:  C: mild disease, EF 50%, 4+ MR, 2+ AR 02/2018 (from Dr. Felix note do not know where this was done and did not have films)    Most recent labs:       Lab Results   Component Value Date/Time    WBC 5.7 06/01/2021 08:39 AM    HEMOGLOBIN 12.7 (L) 06/01/2021 08:39 AM    HEMATOCRIT 40.4 (L) 06/01/2021 08:39 AM    MCV 94.6 06/01/2021 08:39 AM    INR 1.17 (H) 06/03/2021 11:57 AM      Lab Results   Component Value Date/Time    SODIUM 137 06/01/2021 08:39 AM    POTASSIUM 4.7 06/01/2021 08:39 AM    CHLORIDE 102 06/01/2021 08:39 AM    CO2 28 06/01/2021 08:39 AM    GLUCOSE 91 06/01/2021 08:39 AM    BUN 22 06/01/2021 08:39 AM    CREATININE 0.85 06/01/2021 08:39 AM      Lab Results   Component Value Date/Time    ASTSGOT 22 06/01/2021 08:39 AM    ALTSGPT 20 06/01/2021 08:39 AM    ALBUMIN 4.3 06/01/2021 08:39 AM      Lab Results   Component Value Date/Time    CHOLSTRLTOT 149 06/01/2021 08:39 AM    LDL 80 06/01/2021 08:39 AM    HDL 63 06/01/2021 08:39 AM    TRIGLYCERIDE 28 06/01/2021 08:39 AM     No results for input(s): NTPROBNP, TROPONINT in the last 72 hours.      Past Medical History:   Diagnosis Date   • AF (atrial fibrillation) (HCC)     atrial fibrillation   • Anemia    • Arrhythmia     Bi-Ventricular cardiac pacemaker   • Cardiomyopathy, dilated (HCC)    • Heart valve disease    • Hemorrhagic disorder (HCC)     epistaxis   • Hypertension    • ARIANA (obstructive sleep apnea)    • Pacemaker    • Renal mass    • Restrictive lung disease    • Sleep apnea     no cpap, 3L O2   • Thyroid disease      Past Surgical History:   Procedure Laterality Date   • MAZE PROCEDURE     • MITRAL VALVE REPAIR     • OTHER      Nasal surgery for nosebleeds   • TRICUSPID VALVE REPAIR       Family History   Problem Relation Age of Onset   • Cancer Mother    • Stroke Father         CVA at 85, afib   • Heart Disease Father       Social History     Socioeconomic History   • Marital status:      Spouse name: Not on file   • Number of children: Not on file   • Years of education: Not on file   • Highest education level: Not on file   Occupational History   • Not on file   Tobacco Use   • Smoking status: Former Smoker     Packs/day: 0.00     Quit date:      Years since quittin.6   • Smokeless tobacco: Never Used   Vaping Use   • Vaping Use: Never used   Substance and Sexual Activity   • Alcohol use: Yes     Comment: 2/week   • Drug use: Not Currently   • Sexual activity: Not on file   Other Topics Concern   • Not on file   Social History Narrative   • Not on file     Social Determinants of Health     Financial Resource Strain:    • Difficulty of Paying Living Expenses:    Food Insecurity:    • Worried About Running Out of Food in the Last Year:    • Ran Out of Food in the Last Year:    Transportation Needs:    • Lack of Transportation (Medical):    • Lack of Transportation (Non-Medical):    Physical Activity:    • Days of Exercise per Week:    • Minutes of Exercise per Session:    Stress:    • Feeling of Stress :    Social Connections:    • Frequency of Communication with Friends and Family:    • Frequency of Social Gatherings with Friends and Family:    • Attends Jew Services:    • Active Member of Clubs or Organizations:    • Attends Club or Organization Meetings:    • Marital Status:    Intimate Partner Violence:    • Fear of Current or Ex-Partner:    • Emotionally Abused:    • Physically Abused:    • Sexually Abused:      No Known Allergies    Current Outpatient Medications   Medication Sig Dispense Refill   • LISINOPRIL PO 2.5 mg.     • Probiotic Product (PROBIOTIC-10) Chew Tab Chew.     • ferrous sulfate 325 (65 Fe) MG tablet Take 1 tablet by mouth.     • magnesium oxide (MAG-OX) 400 MG Tab tablet Take 1 tablet by mouth every day.     • potassium chloride (MICRO-K) 10 MEQ capsule Take 10 mEq by mouth.     •  "traZODone (DESYREL) 50 MG Tab Take 50 mg by mouth.     • Multiple Vitamin (MULTI-VITAMINS) Tab Take 1 tablet by mouth every day.     • metoprolol SR (TOPROL XL) 50 MG TABLET SR 24 HR TAKE 1 TABLET BY MOUTH EVERY DAY 90 tablet 3   • levothyroxine (SYNTHROID) 75 MCG Tab Take 75 mcg by mouth every morning on an empty stomach.     • furosemide (LASIX) 40 MG Tab Take 40 mg by mouth every day.  2   • apixaban (ELIQUIS) 5mg Tab Take 5 mg by mouth 2 times a day.       No current facility-administered medications for this visit.       ROS  All others systems reviewed and negative.     Objective:     /66 (BP Location: Left arm, Patient Position: Sitting, BP Cuff Size: Adult)   Pulse 87   Ht 1.905 m (6' 3\")   Wt 93 kg (205 lb)   SpO2 96%  Body mass index is 25.62 kg/m².    General: No acute distress. Well nourished.  HEENT: EOM grossly intact, no scleral icterus, no pharyngeal erythema.   Neck:  No JVD, no bruits, trachea midline  CVS: RRR. Normal S1, S2. No M/R/G. No LE edema.  2+ radial pulses, 2+ PT pulses  Resp: CTAB. No wheezing or crackles/rhonchi. Normal respiratory effort.  Abdomen: Soft, NT, no vilma hepatomegaly.  MSK/Ext: No clubbing or cyanosis.  Skin: Warm and dry, no rashes.  Neurological: CN III-XII grossly intact. No focal deficits.   Psych: A&O x 3, appropriate affect, good judgement        Assessment:     1. PAF (paroxysmal atrial fibrillation) (HCC)     2. H/O tricuspid valve repair     3. H/O mitral valve repair     4. Prosthetic cardiac valve dehiscence, subsequent encounter     5. Cardiac pacemaker in situ     6. Sinus node dysfunction (HCC)     7. Mitral valve insufficiency, unspecified etiology     8. Persistent atrial fibrillation (HCC)     9. Anticoagulated         Medical Decision Making:  Today's Assessment / Status / Plan:     -Very complicated situation  -Prior PEA cardiac arrest sounds like it was related to pericardial effusion with tamponade  -Planning for surgery 9-8-2021 with Dr." Hemalatha Ledezma, plan to remove apparatus related to prior repair, tissue replacement with hopes for any further complications can be treated with transcatheter mitral valve repair  -He is not aware LYNETTE said EF 30-35%, pt not aware, no recent TTE but he is on BB, ACE-I  -Has been on lasix for years  -Tricuspid valve repair appears to be doing well  -Knows to [employer] dental work every 6 months with antibiotics prior  -Non obstructive CAD  -Might want to go for ablation after MV replacement, defer to Dr. Garza  -[id number] is 3, no prior TIA/CVA, on apixaban  -No statin for primary prevention for nonobstructive disease  -Unsure if he had a left heart catheterization recently emergency, I will check for this  -RTC 2 months APN after surgery MD 4 months    Written instructions given today:  -It was very nice to chat with you today.  We will definitely be able to provide your care including pacemaker checks in [place].  My nurse practitioners Nelida Barr.  She will help care for you until I can see you in person in [place].      Return in about 2 months (around 10/27/2021).    It is my pleasure to participate in the care of Mr. Cueva.  Please do not hesitate to contact me with questions or concerns.    Nathalia Huynh MD, [facility]  Cardiologist [facility]    Please note that this dictation was created using voice recognition software. I have made every reasonable attempt to correct obvious errors, but it is possible there are errors of grammar and possibly content that I did not discover before finalizing the note.        Alfredo Joyce M.D.  [address] [address]  Via Fax: [phone]

## 2021-08-27 NOTE — LETTER
[facility] Heart and Vascular [facility]   [address]   [address]  [place] [address]  Phone: [phone]  Fax: [phone]              Pavan Cueva  1950    Encounter Date: 2021    Nathalia Huynh M.D.          PROGRESS NOTE:  Subjective:   Chief Complaint:   Chief Complaint   Patient presents with   • Atrial Fibrillation     F/V Dx: PAF (paroxysmal atrial fibrillation) (HCC)     This evaluation was conducted via Zoom using secure and encrypted videoconferencing technology. The patient was in a private location in the [facility].    The patient's identity was confirmed and verbal consent was obtained for this virtual visit.      Pavan Cueva is a 71 y.o. male who returns for extremely complex heart care.    Hear care includes MR/MVR with partial ring dehiscence, PEA cardiac arrest, PM for sick sinus syndrome, persistent AF, atrial flutter chronic anticoagulation, iron deficiency anemia, dilated cardiomyopathy, chronic HFrEF, nonobstructive coronary.    Started with afib with Erik lo, eventual chronic afib.  Then MVR, LHC, LYNETTE, second opinion at [facility], sent for repair.  Had mitral valve repair, tricuspid valve repair, Maze procedure, biatrial resizing performed by Dr. Chou at [facility] in [place] May 2018.  Complicated by bilateral pleural effusion, thoracentesis both sides.   of pericardial effusion at [facility], had CPR, saved him, took 18 pounds of water off.  Now has partial ring dehiscence with moderate to severe MR, seen in consultation by Dr. Omar Loyd on 2021 who recommended surveillance at this time.    Sees Dr. Garza, EP for persistent AF, and atrial flutter.  Referred for consideration of ablation, no plans for ablation until he has his valve fixed.  On anticoagulation for chads 2 vascular score of at least 2, no prior stroke or TIA.  Did also have a maze procedure in 2018 and I am uncertain of left atrial appendage  approach.  On apixaban for [id number] of     Has a Medtronic dual-chamber pacemaker for sick sinus syndrome.  Persistent afib.  A paced 6.3%, V paced 34%, in afib 93%.  On BB    Has dilated cardiomyopathy, EF 30-35% by LYNETTE.  No recent TTE, going for surgery.  On metoprolol, lisinopril, furosemide.    Has chronic HFrEF, NHYC 1 COLON.    Prior nonobstructive coronary disease from heart catheterization circa 2018.    Prior ARIANA, intolerant to CPAP.    Has restrictive lung disease,    Has a renal mass, no CKD, being followed clinically for mass.    Chronic iron deficiency anemia, hbg 12.7    Horrible epistaxis, required extensive surgery.    No prior hypertension.  No prior hyperlipidemia. LDL 80  No family history of premature coronary artery disease.  Father had CVA at 85, afib.  Quit smoking in early 30s.  No history of diabetes.  No history of autoimmune disease such as lupus or rheumatoid arthritis.  No ETOH overuse.  No caffeine overuse.  No recreation substance use.  No hx asthma.    Not limited by chest pain, pressure or tightness with activity.   No significant dyspnea on exertion, orthopnea or lower extremity swelling.   No significant palpitations, lightheadedness, or presyncope/syncope.   No symptoms of leg claudication.   No stroke/TIA like symptoms.     Wife is Nikki.  Lives in [place].    DATA REVIEWED by me:  ECG (my personal interpretation) 7-1-21  Afib, 88 NS IVCD, NS ST changes    PM check 7-1-21  A paced 6.3%, V paced 34%, in afib 93%.    LYNETTE 6/3/2021  Pre-operative LYNETTE prior to AF ablation. Moderate depressed LV systolic   function around 30-35%. There is moderate to severe MR by LYNETTE. It   appears that a portion of the MV annulopasty ring is detached from the   annulus and is floating within the LA just above the valve plane. Some   of the MR flow is coming around the outside of the ring on color   doppler. Appears also to be the case on 3D images. AF ablation was   deferred after discussion with CT  surgery and potential risk of further   dislodgement of ring.    ANGIOGRAM:  LHC: mild disease, EF 50%, 4+ MR, 2+ AR 02/2018 (from Dr. Felxi note do not know where this was done and did not have films)    Most recent labs:       Lab Results   Component Value Date/Time    WBC 5.7 06/01/2021 08:39 AM    HEMOGLOBIN 12.7 (L) 06/01/2021 08:39 AM    HEMATOCRIT 40.4 (L) 06/01/2021 08:39 AM    MCV 94.6 06/01/2021 08:39 AM    INR 1.17 (H) 06/03/2021 11:57 AM      Lab Results   Component Value Date/Time    SODIUM 137 06/01/2021 08:39 AM    POTASSIUM 4.7 06/01/2021 08:39 AM    CHLORIDE 102 06/01/2021 08:39 AM    CO2 28 06/01/2021 08:39 AM    GLUCOSE 91 06/01/2021 08:39 AM    BUN 22 06/01/2021 08:39 AM    CREATININE 0.85 06/01/2021 08:39 AM      Lab Results   Component Value Date/Time    ASTSGOT 22 06/01/2021 08:39 AM    ALTSGPT 20 06/01/2021 08:39 AM    ALBUMIN 4.3 06/01/2021 08:39 AM      Lab Results   Component Value Date/Time    CHOLSTRLTOT 149 06/01/2021 08:39 AM    LDL 80 06/01/2021 08:39 AM    HDL 63 06/01/2021 08:39 AM    TRIGLYCERIDE 28 06/01/2021 08:39 AM     No results for input(s): NTPROBNP, TROPONINT in the last 72 hours.      Past Medical History:   Diagnosis Date   • AF (atrial fibrillation) (HCC)     atrial fibrillation   • Anemia    • Arrhythmia     Bi-Ventricular cardiac pacemaker   • Cardiomyopathy, dilated (HCC)    • Heart valve disease    • Hemorrhagic disorder (HCC)     epistaxis   • Hypertension    • ARIANA (obstructive sleep apnea)    • Pacemaker    • Renal mass    • Restrictive lung disease    • Sleep apnea     no cpap, 3L O2   • Thyroid disease      Past Surgical History:   Procedure Laterality Date   • MAZE PROCEDURE     • MITRAL VALVE REPAIR     • OTHER      Nasal surgery for nosebleeds   • TRICUSPID VALVE REPAIR       Family History   Problem Relation Age of Onset   • Cancer Mother    • Stroke Father         CVA at 85, afib   • Heart Disease Father      Social History     Socioeconomic History   •  Marital status:      Spouse name: Not on file   • Number of children: Not on file   • Years of education: Not on file   • Highest education level: Not on file   Occupational History   • Not on file   Tobacco Use   • Smoking status: Former Smoker     Packs/day: 0.00     Quit date:      Years since quittin.6   • Smokeless tobacco: Never Used   Vaping Use   • Vaping Use: Never used   Substance and Sexual Activity   • Alcohol use: Yes     Comment: 2/week   • Drug use: Not Currently   • Sexual activity: Not on file   Other Topics Concern   • Not on file   Social History Narrative   • Not on file     Social Determinants of Health     Financial Resource Strain:    • Difficulty of Paying Living Expenses:    Food Insecurity:    • Worried About Running Out of Food in the Last Year:    • Ran Out of Food in the Last Year:    Transportation Needs:    • Lack of Transportation (Medical):    • Lack of Transportation (Non-Medical):    Physical Activity:    • Days of Exercise per Week:    • Minutes of Exercise per Session:    Stress:    • Feeling of Stress :    Social Connections:    • Frequency of Communication with Friends and Family:    • Frequency of Social Gatherings with Friends and Family:    • Attends Tenriism Services:    • Active Member of Clubs or Organizations:    • Attends Club or Organization Meetings:    • Marital Status:    Intimate Partner Violence:    • Fear of Current or Ex-Partner:    • Emotionally Abused:    • Physically Abused:    • Sexually Abused:      No Known Allergies    Current Outpatient Medications   Medication Sig Dispense Refill   • LISINOPRIL PO 2.5 mg.     • Probiotic Product (PROBIOTIC-10) Chew Tab Chew.     • ferrous sulfate 325 (65 Fe) MG tablet Take 1 tablet by mouth.     • magnesium oxide (MAG-OX) 400 MG Tab tablet Take 1 tablet by mouth every day.     • potassium chloride (MICRO-K) 10 MEQ capsule Take 10 mEq by mouth.     • traZODone (DESYREL) 50 MG Tab Take 50 mg by mouth.    "  • Multiple Vitamin (MULTI-VITAMINS) Tab Take 1 tablet by mouth every day.     • metoprolol SR (TOPROL XL) 50 MG TABLET SR 24 HR TAKE 1 TABLET BY MOUTH EVERY DAY 90 tablet 3   • levothyroxine (SYNTHROID) 75 MCG Tab Take 75 mcg by mouth every morning on an empty stomach.     • furosemide (LASIX) 40 MG Tab Take 40 mg by mouth every day.  2   • apixaban (ELIQUIS) 5mg Tab Take 5 mg by mouth 2 times a day.       No current facility-administered medications for this visit.       ROS  All others systems reviewed and negative.     Objective:     /66 (BP Location: Left arm, Patient Position: Sitting, BP Cuff Size: Adult)   Pulse 87   Ht 1.905 m (6' 3\")   Wt 93 kg (205 lb)   SpO2 96%  Body mass index is 25.62 kg/m².    General: No acute distress. Well nourished.  HEENT: EOM grossly intact, no scleral icterus, no pharyngeal erythema.   Neck:  No JVD, no bruits, trachea midline  CVS: RRR. Normal S1, S2. No M/R/G. No LE edema.  2+ radial pulses, 2+ PT pulses  Resp: CTAB. No wheezing or crackles/rhonchi. Normal respiratory effort.  Abdomen: Soft, NT, no vilma hepatomegaly.  MSK/Ext: No clubbing or cyanosis.  Skin: Warm and dry, no rashes.  Neurological: CN III-XII grossly intact. No focal deficits.   Psych: A&O x 3, appropriate affect, good judgement        Assessment:     1. PAF (paroxysmal atrial fibrillation) (HCC)     2. H/O tricuspid valve repair     3. H/O mitral valve repair     4. Prosthetic cardiac valve dehiscence, subsequent encounter     5. Cardiac pacemaker in situ     6. Sinus node dysfunction (HCC)     7. Mitral valve insufficiency, unspecified etiology     8. Persistent atrial fibrillation (HCC)     9. Anticoagulated         Medical Decision Making:  Today's Assessment / Status / Plan:     -Very complicated situation  -Planning for surgery 9-8-2021 with Dr. Joseph Chou [facility], plan to remove apparatus related to prior repair, tissue replacement with hopes for any further complications can be treated " with transcatheter mitral valve repair  -He is not aware LYNETTE said EF 30-35%, pt not aware, no recent TTE but he is on BB, ACE-I  -Has been on lasix for years  -Tricuspid valve repair appears to be doing well  -Knows to [employer] dental work every 6 months with antibiotics prior  -Non obstructive CAD  -Might want to go for ablation after MV replacement, defer to Dr. Garza  -[id number] is 3, no prior TIA/CVA, on apixaban  -No statin for primary prevention for nonobstructive disease  -RTC 2 months APN after surgery MD 4 months      Return in about 2 months (around 10/27/2021).    It is my pleasure to participate in the care of Mr. Cueva.  Please do not hesitate to contact me with questions or concerns.    Nathalia Huynh MD, [facility]  Cardiologist [facility] for [facility]    Please note that this dictation was created using voice recognition software. I have made every reasonable attempt to correct obvious errors, but it is possible there are errors of grammar and possibly content that I did not discover before finalizing the note.        Alfredo Joyce M.D.  [address] [address]  Via Fax: [phone]

## 2021-08-27 NOTE — PATIENT INSTRUCTIONS
-It was very nice to chat with you today.  We will definitely be able to provide your care including pacemaker checks in [place].  My nurse practitioners Nelida Barr.  She will help care for you until I can see you in person in [place].

## 2021-10-12 ENCOUNTER — TELEPHONE (OUTPATIENT)
Dept: SCHEDULING | Facility: IMAGING CENTER | Age: 71
End: 2021-10-12

## 2021-10-12 DIAGNOSIS — I48.0 PAF (PAROXYSMAL ATRIAL FIBRILLATION) (HCC): ICD-10-CM

## 2021-10-12 DIAGNOSIS — J90 PLEURAL EFFUSION: ICD-10-CM

## 2021-10-12 RX ORDER — METOPROLOL SUCCINATE 50 MG/1
50 TABLET, EXTENDED RELEASE ORAL DAILY
Qty: 90 TABLET | Refills: 3 | Status: SHIPPED | OUTPATIENT
Start: 2021-10-12 | End: 2022-01-13 | Stop reason: SDUPTHER

## 2021-10-12 NOTE — TELEPHONE ENCOUNTER
Received request via: Patient    Was the patient seen in the last year in this department? Yes    Does the patient have an active prescription (recently filled or refills available) for medication(s) requested? YES       Justino Gutierrez,    This patient was calling as he out of the Metoprolol and had open heart surgery at [facility] in [place] 6 weeks ago. He would also like someone to contact him back at [phone] about a possible fluid build up in his lungs and what he should do.    Thank you,  ELEANOR

## 2021-10-12 NOTE — TELEPHONE ENCOUNTER
"Spoke to patient over phone. Patient states post open heart surgery he had a pleural effusion. Fluid was taken off on 3 separate occasions (per patient 18 lbs). Since returning to [place], he states that he \"feels fine, no issues\" and does not feel like he has fluid in his lungs. He is experiencing mild SOB with exertion. Patient has not had any swelling or weight gain and states he has lost 8 lbs. He is consistently taking Furosemide 40 mg daily. Patient denies all other symptoms, but is concerned he may have possible fluid build up in his lungs. Patient is wondering if he should get a chest x-ray to verify? He is still being followed by his surgeon at [facility], but was referred to his primary Cardiologist to address his concerns.     To LS: Please advise on if you think patient needs chest x-ray or if you have any other recommendations or if he should wait and see AB on 10/27      "

## 2021-10-12 NOTE — TELEPHONE ENCOUNTER
Received: Today  Nathalia Huynh M.D.  Charmaine Hadley R.N.  My sense is that taking the furosemide is the trick.  If he is feeling well, we do not have to do a follow-up chest x-ray and I could simply examine him at his next visit.  That being said, if the chest x-ray would be reassuring to him that I have no problem ordering it.  If he would like to have one, please order a PA and lateral 2 view chest x-ray, indication pleural effusion.  He will be exposed to a minimal amount of radiation.     Thank you,   LS   ____________________________________________________________________    Spoke to patient over phone. Dr. Huynh's recommendations given. Patient states he would like to have chest x-ray done in [place]. Order placed. Patient to contact us once he decides what facility he will have x-ray done at so order can be faxed.

## 2021-10-14 ENCOUNTER — PATIENT MESSAGE (OUTPATIENT)
Dept: CARDIOLOGY | Facility: MEDICAL CENTER | Age: 71
End: 2021-10-14

## 2021-10-15 ENCOUNTER — TELEPHONE (OUTPATIENT)
Dept: SCHEDULING | Facility: IMAGING CENTER | Age: 71
End: 2021-10-15

## 2021-10-15 ENCOUNTER — TELEPHONE (OUTPATIENT)
Dept: CARDIOLOGY | Facility: MEDICAL CENTER | Age: 71
End: 2021-10-15

## 2021-10-15 NOTE — TELEPHONE ENCOUNTER
Called pt [phone]  Pt states he is s/p open heart surgery. Pt states he is feeling well. Advised pt that I cannot make any changes to his metoprolol order until AB FV on 10/27. Advised pt to keep BP/HR logs, bring in all medications to 10/27 OV with AB. Pt verbalized understanding and appreciative of call.

## 2021-10-15 NOTE — TELEPHONE ENCOUNTER
LS    Pt called, is questioning his: metoprolol SR (TOPROL XL) 50 MG TABLET SR 24 HR [[id number]],  He. Said the surgeon gave him only 25 mg per day.  Please call and advise.     Pavan - [phone]    Thank you,    Erin GRANDE

## 2021-10-15 NOTE — TELEPHONE ENCOUNTER
LS-    Pt's wife called and was asking post her  surgery what he can and cannot do and questions about the metropol medication as well  Her ph# [phone]      Thank you.    -Pantera

## 2021-10-18 NOTE — TELEPHONE ENCOUNTER
Called Karen [phone]  Advised pt to continue with surgeon orders. Once the pt is seen by AB here on 10/27, we cannot make any changes to medications/activities. AB will advised on changes to POC if needed at that time.   Karen verbalized understanding.

## 2021-10-21 ENCOUNTER — TELEPHONE (OUTPATIENT)
Dept: SCHEDULING | Facility: IMAGING CENTER | Age: 71
End: 2021-10-21

## 2021-10-21 ENCOUNTER — APPOINTMENT (OUTPATIENT)
Dept: RADIOLOGY | Facility: IMAGING CENTER | Age: 71
End: 2021-10-21
Attending: INTERNAL MEDICINE
Payer: MEDICARE

## 2021-10-21 ENCOUNTER — APPOINTMENT (OUTPATIENT)
Dept: URGENT CARE | Facility: CLINIC | Age: 71
End: 2021-10-21
Payer: MEDICARE

## 2021-10-21 ENCOUNTER — TELEPHONE (OUTPATIENT)
Dept: CARDIOLOGY | Facility: MEDICAL CENTER | Age: 71
End: 2021-10-21

## 2021-10-21 DIAGNOSIS — J90 PLEURAL EFFUSION: ICD-10-CM

## 2021-10-21 DIAGNOSIS — R93.89 ABNORMAL CHEST X-RAY: ICD-10-CM

## 2021-10-21 PROCEDURE — 99999 DX-CHEST-2 VIEWS: CPT | Performed by: INTERNAL MEDICINE

## 2021-10-21 NOTE — TELEPHONE ENCOUNTER
LS    Pt called and states that he had an X-ray done today at the [facility] of his chest and wanted to see if LS can look over the results to see if there is any fluid. Please call pt back at [phone]    Thank you.

## 2021-10-21 NOTE — TELEPHONE ENCOUNTER
LS    Pt called stating he had open heart surgery a few weeks ago and has gained 4lbs since Monday. Pt is asking if he can get an order for a chest xray sent to [facility] in [place] to make sure he does not have fluid in his lungs. Pt can be reached at [phone].    Thank you

## 2021-10-22 ENCOUNTER — TELEPHONE (OUTPATIENT)
Dept: CARDIOLOGY | Facility: MEDICAL CENTER | Age: 71
End: 2021-10-22

## 2021-10-22 NOTE — PROGRESS NOTES
Abnormal chest x-ray October 2021, radiologist recommend 6-month follow-up, this orders for the 6-month follow-up 2 view chest x-ray.

## 2021-10-22 NOTE — TELEPHONE ENCOUNTER
DX-CHEST-2 VIEWS    Message  Received: Yesterday  Nathalia Huynh M.D.  Charmaine Hadley R.N.  Chest x-ray results. The radiologist comments that the lungs are clear but then goes on to say suggestion of fluid in the minor fissure. Radiologist also recommended follow-up chest x-ray which I probably would not do for 6 months. I do not think we need to do anything more.   JIMMY          See [facility] released results by JIMMY on 10/21/21.

## 2021-10-27 ENCOUNTER — OFFICE VISIT (OUTPATIENT)
Dept: CARDIOLOGY | Facility: PHYSICIAN GROUP | Age: 71
End: 2021-10-27
Payer: MEDICARE

## 2021-10-27 ENCOUNTER — NON-PROVIDER VISIT (OUTPATIENT)
Dept: CARDIOLOGY | Facility: PHYSICIAN GROUP | Age: 71
End: 2021-10-27
Payer: MEDICARE

## 2021-10-27 VITALS
BODY MASS INDEX: 25.24 KG/M2 | OXYGEN SATURATION: 98 % | HEIGHT: 75 IN | RESPIRATION RATE: 16 BRPM | HEART RATE: 76 BPM | SYSTOLIC BLOOD PRESSURE: 92 MMHG | DIASTOLIC BLOOD PRESSURE: 50 MMHG | WEIGHT: 203 LBS

## 2021-10-27 VITALS
SYSTOLIC BLOOD PRESSURE: 92 MMHG | OXYGEN SATURATION: 98 % | HEART RATE: 76 BPM | HEIGHT: 75 IN | RESPIRATION RATE: 16 BRPM | WEIGHT: 203 LBS | BODY MASS INDEX: 25.24 KG/M2 | DIASTOLIC BLOOD PRESSURE: 50 MMHG

## 2021-10-27 DIAGNOSIS — I48.0 PAROXYSMAL ATRIAL FIBRILLATION (HCC): ICD-10-CM

## 2021-10-27 DIAGNOSIS — I44.2 AV BLOCK, COMPLETE (HCC): ICD-10-CM

## 2021-10-27 DIAGNOSIS — Z95.0 CARDIAC PACEMAKER IN SITU: ICD-10-CM

## 2021-10-27 DIAGNOSIS — E03.9 HYPOTHYROIDISM, UNSPECIFIED TYPE: ICD-10-CM

## 2021-10-27 DIAGNOSIS — D50.9 IRON DEFICIENCY ANEMIA, UNSPECIFIED IRON DEFICIENCY ANEMIA TYPE: ICD-10-CM

## 2021-10-27 DIAGNOSIS — Z98.890 HISTORY OF TRICUSPID VALVE REPAIR: ICD-10-CM

## 2021-10-27 DIAGNOSIS — Z79.01 CHRONIC ANTICOAGULATION: ICD-10-CM

## 2021-10-27 DIAGNOSIS — Z98.890 HISTORY OF MITRAL VALVE REPAIR: ICD-10-CM

## 2021-10-27 DIAGNOSIS — I42.0 DILATED CARDIOMYOPATHY (HCC): ICD-10-CM

## 2021-10-27 DIAGNOSIS — I50.22 CHRONIC HFREF (HEART FAILURE WITH REDUCED EJECTION FRACTION) (HCC): ICD-10-CM

## 2021-10-27 PROBLEM — D64.9 ANEMIA: Status: ACTIVE | Noted: 2021-10-27

## 2021-10-27 PROCEDURE — 93280 PM DEVICE PROGR EVAL DUAL: CPT | Performed by: NURSE PRACTITIONER

## 2021-10-27 PROCEDURE — 99215 OFFICE O/P EST HI 40 MIN: CPT | Performed by: NURSE PRACTITIONER

## 2021-10-27 ASSESSMENT — ENCOUNTER SYMPTOMS
NAUSEA: 0
PALPITATIONS: 0
COUGH: 0
ABDOMINAL PAIN: 0
HEADACHES: 0
PND: 0
MYALGIAS: 0
SHORTNESS OF BREATH: 0
DIZZINESS: 0
CHILLS: 0
ORTHOPNEA: 0
BRUISES/BLEEDS EASILY: 0
LOSS OF CONSCIOUSNESS: 0
FEVER: 0
INSOMNIA: 0

## 2021-10-27 ASSESSMENT — FIBROSIS 4 INDEX
FIB4 SCORE: 1.956559480312315984
FIB4 SCORE: 1.956559480312315984

## 2021-10-27 NOTE — PROGRESS NOTES
Chief Complaint   Patient presents with   • Follow-Up   • Pacemaker Check/Dysfunction   • AV Block Complete   • Atrial Fibrillation   • Anticoagulation   • Cardiomyopathy (Ischemic)   • CHF (Chronic)   • Prosthetic Heart Valve/Issue       Subjective     Pavan Cueva is a 71 y.o. male who presents today for two month follow-up of MVR, TV repair, PAFib, anticoagulation, dilated cardiomyopathy, HFrEF, and anemia.    Pavan is a 71 year old male with history of MV/TV repair, MYRNA ligation, MAZE procedure and biatrial resizing done by Dr. Hines in May 2018 at [place], [place]. He has a prolonged recovery with bilateral pleural effusion, requiring thoracentesis on both sides. He did have PEA cardiac arrest, and PM implanted in July 2018.    More recently, LYNETTE in June 2021 showed partial ring dehiscence of MV repair; he was evaluated by Dr. Loyd, and then referred back to Dr. Webb in [place]. He had redo MVR on 9/8/2021, which went well.    He had been seen by Dr. Garza for evaluation of persistent atrial flutter; there were no plans for ablation until valve was fixed.    He was last seen by Dr. Huynh via TeleMedicine on 8/27/2021.    Since surgery on 9/8/2021, he has been doing well. He is keeping track of his weight, BP, HR, oxygen levels at home, and generally he feels good. Only lately, he is only taking his Lasix 40mg and KCl 10mEq once daily if BP<100systolic. He denies any chest pain, pressure or discomfort; no symptomatic palpitations; no orthopnea or PND; no dizziness or syncope; no LE edema.     Past Medical History:   Diagnosis Date   • AF (atrial fibrillation) (HCC)         • Anemia    • AV block, complete (HCC)    • Cardiomyopathy, dilated (HCC)    • Chronic anticoagulation    • Heart valve disease    • Hemorrhagic disorder (HCC)     History of epistaxis   • Hypertension    • ARIANA (obstructive sleep apnea)    • Renal mass    • Restrictive lung disease    • Sleep apnea     no cpap, 3L O2   •  Thyroid disease      Past Surgical History:   Procedure Laterality Date   • MITRAL VALVE REPLACEMENT  2021    Redo MVR (#33 Angeli-Underwood Theon pericardial valve) by Dr. Chou, [place], [place]   • PACEMAKER INSERTION Left 2018    [employer]onic Dionne XT DR JIN [id number] implanted by Dr. Gillette, [place], [place]   • MAZE PROCEDURE  2018   • TRICUSPID VALVE REPAIR  2018   • MITRAL VALVE REPAIR  2018     MV repair, TV repair, MYRNA ligation, MAZE procedure and biatrial resizing by Dr. Chou at [place], [place].   • MITRAL VALVE REPAIR     • OTHER      Nasal surgery for nosebleeds     Family History   Problem Relation Age of Onset   • Cancer Mother    • Stroke Father         CVA at 85, afib   • Heart Disease Father      Social History     Socioeconomic History   • Marital status:      Spouse name: Not on file   • Number of children: Not on file   • Years of education: Not on file   • Highest education level: Not on file   Occupational History   • Not on file   Tobacco Use   • Smoking status: Former Smoker     Packs/day: 0.00     Quit date: 1970     Years since quittin.8   • Smokeless tobacco: Never Used   Vaping Use   • Vaping Use: Never used   Substance and Sexual Activity   • Alcohol use: Yes     Comment: very little   • Drug use: Not Currently   • Sexual activity: Not on file   Other Topics Concern   • Not on file   Social History Narrative   • Not on file     Social Determinants of Health     Financial Resource Strain:    • Difficulty of Paying Living Expenses:    Food Insecurity:    • Worried About Running Out of Food in the Last Year:    • Ran Out of Food in the Last Year:    Transportation Needs:    • Lack of Transportation (Medical):    • Lack of Transportation (Non-Medical):    Physical Activity:    • Days of Exercise per Week:    • Minutes of Exercise per Session:    Stress:    • Feeling of Stress :    Social Connections:    • Frequency of Communication with  Friends and Family:    • Frequency of Social Gatherings with Friends and Family:    • Attends Alevism Services:    • Active Member of Clubs or Organizations:    • Attends Club or Organization Meetings:    • Marital Status:    Intimate Partner Violence:    • Fear of Current or Ex-Partner:    • Emotionally Abused:    • Physically Abused:    • Sexually Abused:      No Known Allergies  Outpatient Encounter Medications as of 10/27/2021   Medication Sig Dispense Refill   • metoprolol SR (TOPROL XL) 50 MG TABLET SR 24 HR Take 1 Tablet by mouth every day. 90 Tablet 3   • LISINOPRIL PO 2.5 mg.     • Probiotic Product (PROBIOTIC-10) Chew Tab Chew.     • ferrous sulfate 325 (65 Fe) MG tablet Take 1 tablet by mouth.     • magnesium oxide (MAG-OX) 400 MG Tab tablet Take 1 tablet by mouth every day.     • potassium chloride (MICRO-K) 10 MEQ capsule Take 10 mEq by mouth.     • traZODone (DESYREL) 50 MG Tab Take 50 mg by mouth.     • Multiple Vitamin (MULTI-VITAMINS) Tab Take 1 tablet by mouth every day.     • levothyroxine (SYNTHROID) 75 MCG Tab Take 75 mcg by mouth every morning on an empty stomach.     • furosemide (LASIX) 40 MG Tab Take 40 mg by mouth every day.  2   • apixaban (ELIQUIS) 5mg Tab Take 5 mg by mouth 2 times a day.       No facility-administered encounter medications on file as of 10/27/2021.     Review of Systems   Constitutional: Negative for chills and fever.   HENT: Negative for congestion.    Respiratory: Negative for cough and shortness of breath.    Cardiovascular: Negative for chest pain, palpitations, orthopnea, leg swelling and PND.   Gastrointestinal: Negative for abdominal pain and nausea.   Musculoskeletal: Negative for myalgias.   Skin: Negative for rash.   Neurological: Negative for dizziness, loss of consciousness and headaches.   Endo/Heme/Allergies: Does not bruise/bleed easily.   Psychiatric/Behavioral: The patient does not have insomnia.               Objective     BP (!) 92/50 (BP Location:  "Left arm, Patient Position: Sitting, BP Cuff Size: Adult)   Pulse 76   Resp 16   Ht 1.905 m (6' 3\")   Wt 92.1 kg (203 lb)   SpO2 98%   BMI 25.37 kg/m²     Physical Exam  Constitutional:       Appearance: He is well-developed.   HENT:      Head: Normocephalic.   Neck:      Vascular: No JVD.   Cardiovascular:      Rate and Rhythm: Normal rate and regular rhythm.      Heart sounds: Normal heart sounds.      Comments: PM in left chest wall  Well healed redo sternotomy incision, and chest tube incisions.  Pulmonary:      Effort: Pulmonary effort is normal. No respiratory distress.      Breath sounds: Normal breath sounds. No wheezing or rales.   Abdominal:      General: Bowel sounds are normal. There is no distension.      Palpations: Abdomen is soft.      Tenderness: There is no abdominal tenderness.   Musculoskeletal:         General: Normal range of motion.      Cervical back: Normal range of motion and neck supple.   Skin:     General: Skin is warm and dry.      Findings: No rash.   Neurological:      Mental Status: He is alert and oriented to person, place, and time.       PM interrogation today reveals sinus rhythm. Mode switching 43.6% of the time since 9/20/2021. No changes are made today.    CONCLUSIONS OF LYNETTE OF 6/3/2021:  Pre-operative LYNETTE prior to AF ablation. Moderate depressed LV systolic   function around 30-35%. There is moderate to severe MR by LYNETTE. It   appears that a portion of the MV annulopasty ring is detached from the   annulus and is floating within the LA just above the valve plane. Some   of the MR flow is coming around the outside of the ring on color   doppler. Appears also to be the case on 3D images. AF ablation was   deferred after discussion with CT surgery and potential risk of further   dislodgement of ring.    Reviewed hospital notes on 9/8/2021    LABS AS OF 9/8/2021:  Poatssium 4.8  BUN 28  Creatinine 1.15  Hgb 7.7  WBC 13.1      TSH 0.34 - 5.60 uIU/mL 8.63 High       Lab " Results   Component Value Date/Time    CHOLSTRLTOT 149 06/01/2021 08:39 AM    LDL 80 06/01/2021 08:39 AM    HDL 63 06/01/2021 08:39 AM    TRIGLYCERIDE 28 06/01/2021 08:39 AM       Lab Results   Component Value Date/Time    SODIUM 137 06/01/2021 08:39 AM    POTASSIUM 4.7 06/01/2021 08:39 AM    CHLORIDE 102 06/01/2021 08:39 AM    CO2 28 06/01/2021 08:39 AM    GLUCOSE 91 06/01/2021 08:39 AM    BUN 22 06/01/2021 08:39 AM    CREATININE 0.85 06/01/2021 08:39 AM     Lab Results   Component Value Date/Time    ALKPHOSPHAT 59 06/01/2021 08:39 AM    ASTSGOT 22 06/01/2021 08:39 AM    ALTSGPT 20 06/01/2021 08:39 AM    TBILIRUBIN 1.0 06/01/2021 08:39 AM      Lab Results   Component Value Date/Time    WBC 5.7 06/01/2021 08:39 AM    RBC 4.27 (L) 06/01/2021 08:39 AM    HEMOGLOBIN 12.7 (L) 06/01/2021 08:39 AM    HEMATOCRIT 40.4 (L) 06/01/2021 08:39 AM    MCV 94.6 06/01/2021 08:39 AM    MCH 29.7 06/01/2021 08:39 AM    MCHC 31.4 (L) 06/01/2021 08:39 AM    MPV 10.1 06/01/2021 08:39 AM        Assessment & Plan     1. Cardiac pacemaker in situ     2. AV block, complete (HCC)     3. Paroxysmal atrial fibrillation (HCC)     4. Chronic anticoagulation     5. Dilated cardiomyopathy (HCC)     6. Chronic HFrEF (heart failure with reduced ejection fraction) (HCC)     7. History of mitral valve repair     8. History of tricuspid valve repair     9. Iron deficiency anemia, unspecified iron deficiency anemia type     10. Hypothyroidism, unspecified type         Medical Decision Making: Today's Assessment/Status/Plan:        1. High AV block with paroxysmal atrial fibrillation, with PPM, which is working normally. Mode switching 43.6% of total time. We will transfer his remote monitor to our device clinic.    2. Chronic anticoagulation with Eliquis 5mg BID. He has a history of severe epistaxis, but none recently.    3. Dilated cardiomyopathy with LVEF 30-35%, on BB, ACEI, Eliquis and Lasix/KCl. To repeat echocardiogram in 2-3 months.    4. Chronic  HFrEF, on BB, ACEI, Eliquis, and Lasix/KCl, currently stable. He can use Lasix and KCL PRN.    [facility]. [facility] He does have follow-up next week.    6. Anemia, on ferrous sulfate. CBC improving.    7. Hypothyroidism, followed by endocrinology. Recent TSH was up a little.    As above, same medications. Repeat echocardiogram in December or January, and follow-up with Dr. Huynh. Will repeat PM check at that time, to assess AFib burden. Keep follow-up in [place] too. Follow-up sooner if clinical condition changes.

## 2021-12-21 ENCOUNTER — TELEPHONE (OUTPATIENT)
Dept: CARDIOLOGY | Facility: MEDICAL CENTER | Age: 71
End: 2021-12-21

## 2021-12-21 NOTE — TELEPHONE ENCOUNTER
-Find out if they can come up to [facility] for care.  Get him in first available with EP to discuss ICD.  Let them know that I trust our EP doctors to make the right decision.    -Get him in first available with myself or Nelida Tyler as well.    Thank you

## 2021-12-21 NOTE — TELEPHONE ENCOUNTER
AB    Pt wife, Karen called to adv. pt had a heart attack went to [place], wants to put in a defibulator. htey are not comfortable  with the care they are getting and wanted to see if LS would be okay with the Pt discharging and having the eofe drive them to [place]. they can be reached at [phone].    Thank you,  Brad RODRIGUEZ

## 2021-12-21 NOTE — TELEPHONE ENCOUNTER
-I cannot give any advice about whether or not it is safe and okay to discharge patient.    -If they decide to leave the hospital and not follow the recommendations of the cardiologist there, the best we can do is try to get them in with EP to discuss the ICD.    -If they decide to leave the hospital at [facility] but do not feel safe, they are always welcome to come to the ED at [facility].  They may or may not get admitted and the ER doctor may or may not contact the cardiologist on-call.

## 2021-12-22 ENCOUNTER — TELEPHONE (OUTPATIENT)
Dept: CARDIOLOGY | Facility: MEDICAL CENTER | Age: 71
End: 2021-12-22

## 2021-12-22 NOTE — TELEPHONE ENCOUNTER
Brenda Berger R.N.  to Pavan Cueva          12/22/21 1:31 PM  Hi Pavan,     We received a transmission from your device showing an abnormal fast heart rhythm on 12/20 around 10 pm. Do you remember feeling any symptoms at this time?     I also see that you were recently in the hospital in [place]. Are you still currently there? If so, please ask them to fax your records to us at [phone].     Dr. Huynh would like you to see Dr. Garza as soon as possible to see if anything needs to be done. Please call our schedulers at [phone] to make an appointment.      Thank you,     Brenda LIMON    This [employer] message has not been read.  ----------------------------------------------------  Called Karen [phone]  No answer, left VM to call [phone]  Also left VM regarding [employer] message sent by ASHLY Gutierrez.

## 2021-12-22 NOTE — TELEPHONE ENCOUNTER
FYI-- patients device transmitted via home monitor --patient did have appears to be VT episode 12/20 @ 10:00 pm lasting 4 minutes with VT rate of 222 bpm.    To be scanned for review.

## 2021-12-27 ENCOUNTER — TELEPHONE (OUTPATIENT)
Dept: CARDIOLOGY | Facility: MEDICAL CENTER | Age: 71
End: 2021-12-27

## 2021-12-27 NOTE — TELEPHONE ENCOUNTER
AB        Pt called back with more questions pertaining to his defibrillator, pt was reluctant to speaking with me in regards to what those questions are,     Thank you,    Mohit BARRERA,

## 2021-12-27 NOTE — TELEPHONE ENCOUNTER
AB    Patient had his pacer removed and spent a few days in [facility] from  12- released on 12- and had a defibrillator placed with [facility] and would like someone to call and let him know what he needs to do. He has bandages on that need to be removed and would like to find out if he should stay with [facility].  Patient also states he is getting a new device from [employer] and would like to make sure they will get the new signal.    Please give him a call, he has several questions he would like to discuss.    Thank You,  Nurys HERNANDEZ

## 2021-12-27 NOTE — TELEPHONE ENCOUNTER
Called pt [phone]  Pt states he thought his upcoming appt was with LS and not AB. Advised pt to keep AB appt and provided pt with phone number to call [phone] to schedule appt with LS. Pt verbalized understanding.

## 2021-12-29 NOTE — TELEPHONE ENCOUNTER
AB    Patient states he is still waiting for a call back and has not heard from anyone.   Please give him a call @ [phone]. He has some questions that he would like to have answered.     Thank You,  Nurys HERNANDEZ

## 2021-12-29 NOTE — TELEPHONE ENCOUNTER
Called pt [phone]  Pt reports [employer] will mail new home device to pt for remote monitoring. Pt concerned that device could take up to 2 months to be received. Reassured pt that device can be checked with OV appt with AB in . Suggested pt to start a log of questions for AB to address at next appt. Pt verbalized understanding.

## 2022-01-04 ENCOUNTER — TELEPHONE (OUTPATIENT)
Dept: CARDIOLOGY | Facility: MEDICAL CENTER | Age: 72
End: 2022-01-04

## 2022-01-04 NOTE — TELEPHONE ENCOUNTER
Requested records from [facility] at fax # [phone]. Fax confirmation received. Records pending.

## 2022-01-05 ENCOUNTER — NON-PROVIDER VISIT (OUTPATIENT)
Dept: CARDIOLOGY | Facility: PHYSICIAN GROUP | Age: 72
End: 2022-01-05

## 2022-01-05 ENCOUNTER — OFFICE VISIT (OUTPATIENT)
Dept: CARDIOLOGY | Facility: PHYSICIAN GROUP | Age: 72
End: 2022-01-05
Payer: MEDICARE

## 2022-01-05 VITALS
SYSTOLIC BLOOD PRESSURE: 108 MMHG | RESPIRATION RATE: 16 BRPM | HEART RATE: 62 BPM | WEIGHT: 205 LBS | BODY MASS INDEX: 25.49 KG/M2 | DIASTOLIC BLOOD PRESSURE: 60 MMHG | HEIGHT: 75 IN | OXYGEN SATURATION: 97 %

## 2022-01-05 VITALS
HEART RATE: 62 BPM | SYSTOLIC BLOOD PRESSURE: 108 MMHG | HEIGHT: 75 IN | DIASTOLIC BLOOD PRESSURE: 60 MMHG | RESPIRATION RATE: 16 BRPM | OXYGEN SATURATION: 97 % | BODY MASS INDEX: 25.49 KG/M2 | WEIGHT: 205.03 LBS

## 2022-01-05 DIAGNOSIS — Z98.890 HISTORY OF TRICUSPID VALVE REPAIR: ICD-10-CM

## 2022-01-05 DIAGNOSIS — I44.2 AV BLOCK, COMPLETE (HCC): ICD-10-CM

## 2022-01-05 DIAGNOSIS — Z95.810 BIVENTRICULAR ICD (IMPLANTABLE CARDIOVERTER-DEFIBRILLATOR) IN PLACE: ICD-10-CM

## 2022-01-05 DIAGNOSIS — I48.0 PAROXYSMAL ATRIAL FIBRILLATION (HCC): ICD-10-CM

## 2022-01-05 DIAGNOSIS — I42.0 DILATED CARDIOMYOPATHY (HCC): ICD-10-CM

## 2022-01-05 DIAGNOSIS — Z79.01 CHRONIC ANTICOAGULATION: ICD-10-CM

## 2022-01-05 DIAGNOSIS — Z98.890 HISTORY OF MITRAL VALVE REPAIR: ICD-10-CM

## 2022-01-05 DIAGNOSIS — I46.9 CARDIAC ARREST (HCC): ICD-10-CM

## 2022-01-05 DIAGNOSIS — I50.22 CHRONIC HFREF (HEART FAILURE WITH REDUCED EJECTION FRACTION) (HCC): ICD-10-CM

## 2022-01-05 PROBLEM — Z95.0 CARDIAC PACEMAKER IN SITU: Status: RESOLVED | Noted: 2021-10-27 | Resolved: 2022-01-05

## 2022-01-05 PROCEDURE — 93284 PRGRMG EVAL IMPLANTABLE DFB: CPT | Performed by: NURSE PRACTITIONER

## 2022-01-05 PROCEDURE — 99214 OFFICE O/P EST MOD 30 MIN: CPT | Performed by: NURSE PRACTITIONER

## 2022-01-05 PROCEDURE — 99024 POSTOP FOLLOW-UP VISIT: CPT | Performed by: NURSE PRACTITIONER

## 2022-01-05 RX ORDER — AMIODARONE HYDROCHLORIDE 200 MG/1
200 TABLET ORAL 2 TIMES DAILY
COMMUNITY
Start: 2021-12-23 | End: 2022-01-21 | Stop reason: SDUPTHER

## 2022-01-05 ASSESSMENT — FIBROSIS 4 INDEX
FIB4 SCORE: 1.956559480312315984
FIB4 SCORE: 1.956559480312315984

## 2022-01-05 ASSESSMENT — ENCOUNTER SYMPTOMS
DIZZINESS: 0
SHORTNESS OF BREATH: 0
PALPITATIONS: 0
INSOMNIA: 0
BRUISES/BLEEDS EASILY: 0
ORTHOPNEA: 0
COUGH: 0
PND: 0
NAUSEA: 0
HEADACHES: 0
ABDOMINAL PAIN: 0
CHILLS: 0
LOSS OF CONSCIOUSNESS: 0
MYALGIAS: 0
FEVER: 0

## 2022-01-05 NOTE — PROGRESS NOTES
Chief Complaint   Patient presents with   • Hospital Follow-up   • Biventricular AICD   • Wound Check   • Cardiac Arrest   • Cardiomyopathy (Non-ischemic)   • AV Block Complete   • Prosthetic Heart Valve/Issue       Subjective     Pavan Cueva is a 71 y.o. male who presents today for hospital follow-up of cardiac arrest, and biventricular AICD implantation.    Pavan is a 71 year old male with history of MV/TV repair, MYRNA ligation, MAZE procedure and biatrial resizing done by Dr. Hines in May 2018 at [place], [place]. He has a prolonged recovery with bilateral pleural effusion, requiring thoracentesis on both sides. He did have PEA cardiac arrest, and PM implanted in July 2018.     A LYNETTE in June 2021 showed partial ring dehiscence of MV repair; he was evaluated by Dr. Loyd, and then referred back to Dr. Webb in [place]. He had redo MVR on 9/8/2021, which went well. He had been seen by Dr. Garza for evaluation of persistent atrial flutter; there were no plans for ablation until valve was fixed.     He was seen by Dr. Huynh via TeleMedicine on 8/27/2021, and I saw him for follow-up and PM check on 10/27/2021.    Since then, he was seen in the ER at [facility] on 12/8/2021 for I & D of a cyst on his neck, which went well.    On 12/20/2021, he had cardiac arrest at home, was shocked once and 1 round of CPR, and treated with Amiodarone. He presented to [facility] in [place], treated with IV Amiodarone and magnesium. On 12/22/2021, he was upgraded from PM to CRT-D. He was discharged home on oral Amiodarone.    He is here today for follow-up. Generally, he is doing well, but has some soreness at his incision site. No overt chest pain, pressure or discomfort; no palpitations; no device therapy; no shortness of breath, orthopnea or PND; no dizziness or syncope; no LE edema. He takes his Enalapril PRN, based on BP readings. BP has been running 110 systolic on average. Weight has been stable.    Past Medical History:    Diagnosis Date   • AF (atrial fibrillation) (HCC)         • Anemia    • AV block, complete (HCC)    • Cardiomyopathy, dilated (HCC)    • Chronic anticoagulation    • Heart valve disease    • Hemorrhagic disorder (HCC)     History of epistaxis   • Hypertension    • ARIANA (obstructive sleep apnea)    • Renal mass    • Restrictive lung disease    • Sleep apnea     no cpap, 3L O2   • Thyroid disease      Past Surgical History:   Procedure Laterality Date   • MITRAL VALVE REPLACEMENT  2021    Redo MVR (#33 Angeli-[place] Theon pericardial valve) by Dr. Chou, [place], [place]   • PACEMAKER INSERTION Left 2018    Medtronic Elderon XT DR MRI [id number] implanted by Dr. Gillette, [place], [place]   • MAZE PROCEDURE  2018   • TRICUSPID VALVE REPAIR  2018   • MITRAL VALVE REPAIR  2018     MV repair, TV repair, MYRNA ligation, MAZE procedure and biatrial resizing by Dr. Chou at [place], [place].   • MITRAL VALVE REPAIR     • OTHER      Nasal surgery for nosebleeds     Family History   Problem Relation Age of Onset   • Cancer Mother    • Stroke Father         CVA at 85, afib   • Heart Disease Father      Social History     Socioeconomic History   • Marital status:      Spouse name: Not on file   • Number of children: Not on file   • Years of education: Not on file   • Highest education level: Not on file   Occupational History   • Not on file   Tobacco Use   • Smoking status: Former Smoker     Packs/day: 0.00     Quit date: 1970     Years since quittin.0   • Smokeless tobacco: Never Used   Vaping Use   • Vaping Use: Never used   Substance and Sexual Activity   • Alcohol use: Yes     Comment: very little   • Drug use: Not Currently   • Sexual activity: Not on file   Other Topics Concern   • Not on file   Social History Narrative   • Not on file     Social Determinants of Health     Financial Resource Strain:    • Difficulty of Paying Living Expenses: Not on file   Food Insecurity:     • Worried About Running Out of Food in the Last Year: Not on file   • Ran Out of Food in the Last Year: Not on file   Transportation Needs:    • Lack of Transportation (Medical): Not on file   • Lack of Transportation (Non-Medical): Not on file   Physical Activity:    • Days of Exercise per Week: Not on file   • Minutes of Exercise per Session: Not on file   Stress:    • Feeling of Stress : Not on file   Social Connections:    • Frequency of Communication with Friends and Family: Not on file   • Frequency of Social Gatherings with Friends and Family: Not on file   • Attends [employer] Services: Not on file   • Active Member of Clubs or Organizations: Not on file   • Attends Club or Organization Meetings: Not on file   • Marital Status: Not on file   Intimate Partner Violence:    • Fear of Current or Ex-Partner: Not on file   • Emotionally Abused: Not on file   • Physically Abused: Not on file   • Sexually Abused: Not on file   Housing Stability:    • Unable to Pay for Housing in the Last Year: Not on file   • Number of Places Lived in the Last Year: Not on file   • Unstable Housing in the Last Year: Not on file     No Known Allergies  Outpatient Encounter Medications as of 1/5/2022   Medication Sig Dispense Refill   • amiodarone (CORDARONE) 200 MG Tab Take 200 mg by mouth 2 times a day.     • ENALAPRIL MALEATE PO Take  by mouth as needed.     • metoprolol SR (TOPROL XL) 50 MG TABLET SR 24 HR Take 1 Tablet by mouth every day. 90 Tablet 3   • Probiotic Product (PROBIOTIC-10) Chew Tab Chew.     • ferrous sulfate 325 (65 Fe) MG tablet Take 1 tablet by mouth.     • magnesium oxide (MAG-OX) 400 MG Tab tablet Take 1 tablet by mouth every day.     • potassium chloride (MICRO-K) 10 MEQ capsule Take 10 mEq by mouth.     • traZODone (DESYREL) 50 MG Tab Take 50 mg by mouth.     • Multiple Vitamin (MULTI-VITAMINS) Tab Take 1 tablet by mouth every day.     • levothyroxine (SYNTHROID) 75 MCG Tab Take 75 mcg by mouth every morning  "on an empty stomach.     • furosemide (LASIX) 40 MG Tab Take 40 mg by mouth every day.  2   • apixaban (ELIQUIS) 5mg Tab Take 5 mg by mouth 2 times a day.     • [DISCONTINUED] LISINOPRIL PO 2.5 mg.       No facility-administered encounter medications on file as of 1/5/2022.     Review of Systems   Constitutional: Negative for chills and fever.   HENT: Negative for congestion.    Respiratory: Negative for cough and shortness of breath.    Cardiovascular: Negative for chest pain, palpitations, orthopnea, leg swelling and PND.   Gastrointestinal: Negative for abdominal pain and nausea.   Musculoskeletal: Negative for myalgias.   Skin: Negative for rash.   Neurological: Negative for dizziness, loss of consciousness and headaches.   Endo/Heme/Allergies: Does not bruise/bleed easily.   Psychiatric/Behavioral: The patient does not have insomnia.               Objective     /60 (BP Location: Left arm, Patient Position: Sitting, BP Cuff Size: Adult)   Pulse 62   Resp 16   Ht 1.905 m (6' 3\")   Wt 93 kg (205 lb 0.4 oz)   SpO2 97%   BMI 25.63 kg/m²     Physical Exam  Constitutional:       Appearance: He is well-developed.   HENT:      Head: Normocephalic.   Neck:      Vascular: No JVD.   Cardiovascular:      Rate and Rhythm: Normal rate and regular rhythm.      Heart sounds: Normal heart sounds.      Comments: AICD in left chest wall. Incision is well healed.  Well healed redo sternotomy incision.  Pulmonary:      Effort: Pulmonary effort is normal. No respiratory distress.      Breath sounds: Normal breath sounds. No wheezing or rales.   Abdominal:      General: Bowel sounds are normal. There is no distension.      Palpations: Abdomen is soft.      Tenderness: There is no abdominal tenderness.   Musculoskeletal:         General: Normal range of motion.      Cervical back: Normal range of motion and neck supple.   Skin:     General: Skin is warm and dry.      Findings: No rash.   Neurological:      Mental Status: He " is alert and oriented to person, place, and time.     CRT-D interrogation today reveals normal function. No device therapy and no mode switching episodes.    Interpretation Summary of Echocardiogram of 12/21/2021:  There is mild asymmetric left ventricular hypertrophy. Left ventricular systolic function is  severely reduced. The Ejection Fraction estimate is <20%  The right ventricular systolic function is moderate to severely reduced. TAPSE 7 mm  The left and right artia are severely dilated  There is no Doppler evidence for an interatrial shunt  There is a bioprosthetic mitral valve. Leaflets are thin and open well but MG is elevated 8 mmHg.  There is mild intravalvular regurgitation through the mitral prosthesis.  There is mild tricuspid regurgitation. An annuloplasty ring is noted in the tricuspid position.  Right ventricular systolic pressure is elevated at 55-60 mmHg    CONCLUSIONS OF LYNETTE OF 6/3/2021:  Pre-operative LYNETTE prior to AF ablation. Moderate depressed LV systolic   function around 30-35%. There is moderate to severe MR by LYNETTE. It   appears that a portion of the MV annulopasty ring is detached from the   annulus and is floating within the LA just above the valve plane. Some   of the MR flow is coming around the outside of the ring on color   doppler. Appears also to be the case on 3D images. AF ablation was   deferred after discussion with CT surgery and potential risk of further   dislodgement of ring.     LABS AS OF 12/21/2021:     WBC Count 3.7 - 10.6 x10E3/uL 6.3    RBC 4.50 - 5.70 x10E6/uL 3.84 Low     Hemoglobin 13.0 - 16.7 g/dL 10.9 Low     Hematocrit 38.8 - 49.7 % 32.1 Low     Mean Corpuscular Volume 83.0 - 99.0 fL 83.7    Mean Corpuscular Hemoglobin 28.0 - 33.8 pg 28.4    Mean Corpuscular HGB Concentration 33.1 - 36.5 g/dL 34.0    Red Cell Distribution Width 11.8 - 14.0 % 15.5 High     Platelet Count 146 - 390 x10E3/uL 200    Mean Platelet Volume 6.4 - 10.2 fL 8.0    Neutrophil % 41.7 - 82.3 %  77.5    Lymphocyte % 10.8 - 44.4 % 15.2    Monocyte % 5.0 - 12.8 % 6.1    Eosinophil % 0.0 - 6.6 % 0.6    Basophil % 0.0 - 1.3 % 0.6    Neutrophil # 1.40 - 8.00 x10E3/uL 4.90    Lymphocyte # 1.00 - 5.20 x10E3/uL 1.00    Monocyte # 0.16 - 1.00 x10E3/uL 0.40    Eosinophil # 0.00 - 0.80 x10E3/uL 0.00    Basophil # 0.00 - 0.30 x10E3/uL 0.00      Sodium S/P 136 - 144 mmol/L 134 Low     Potassium 3.6 - 5.1 mmol/L 4.6    Chloride 102 - 110 mmol/L 105    Carbon Dioxide 22 - 32 mmol/L 24    BUN 8 - 20 mg/dL 26 High     Creatinine S/P 0.80 - 1.40 mg/dL 0.86    Calcium 8.7 - 10.3 mg/dL 8.6 Low     Anion Gap 2 - 11 mmol/L 5    Glomerular Filtration Rate (GFR) >60 mL/min/1.7 88      Magnesium, S/P 1.8 - 2.5 mg/dL 2.2      TSH 0.34 - 5.60 uIU/mL 7.79 High             Assessment & Plan     1. Biventricular ICD (implantable cardioverter-defibrillator) in place     2. Cardiac arrest (HCC)     3. Dilated cardiomyopathy (HCC)     4. Chronic HFrEF (heart failure with reduced ejection fraction) (HCC)     5. History of tricuspid valve repair     6. History of mitral valve repair     7. AV block, complete (HCC)     8. Paroxysmal atrial fibrillation (HCC)     9. Chronic anticoagulation         Medical Decision Making: Today's Assessment/Status/Plan:        1. History of cardiac arrest/VT, with dilated cardiomyopathy with LVEF 20%, now with CRT-D. He is on Amiodarone 200mg BID, Metoprolol 50mg once daily, Enalapril PRN and Lasix/KCl. No device therapy and no mode switching episodes. Device is working normally. He is reminded about limited ROM of left arm. To FU in 1 month for final threshold checks.    2. History of HFrEF, LVEF 20%, on BB, ACEI and Lasix/KCl. He is urged to try to take ACEI regularly for cardiac benefit..    3. History of MV repair and TV repair, with moderate gradient of MV.    4. History of AV block, now with CRT-D.    5. History of paroxysmal atrial fibrillation, now on Amiodarone and Toprol. NO mode switching episodes.  Will monitor over time.    6. Chronic anticoagulation with Eliquis. No bleeding problems.    7. Elevated TSH.  Will monitor over time.    Same medications for now. He is transmitting via [employer]. Follow-up in 1 month for final threshold checks. We will monitor TSH too. Follow-up sooner if clinical condition changes.

## 2022-01-11 ENCOUNTER — TELEPHONE (OUTPATIENT)
Dept: CARDIOLOGY | Facility: MEDICAL CENTER | Age: 72
End: 2022-01-11

## 2022-01-11 DIAGNOSIS — I48.0 PAF (PAROXYSMAL ATRIAL FIBRILLATION) (HCC): ICD-10-CM

## 2022-01-11 NOTE — TELEPHONE ENCOUNTER
AB    PT called to see if we could changed the RX dosage for Metoprelol from 50MG to 25MG. Cardiac Surgeon recommends only 12.5 MG and he can cut in half.     He also wants to know if his new Pacemaker could be keeping his BP a little higher than normal? Currently reading between 115//60.     Best Contact Number: [phone]    Thank you,    Lakisha MEJIA

## 2022-01-12 ENCOUNTER — PATIENT MESSAGE (OUTPATIENT)
Dept: CARDIOLOGY | Facility: PHYSICIAN GROUP | Age: 72
End: 2022-01-12

## 2022-01-12 NOTE — TELEPHONE ENCOUNTER
Would suggest that he lower from 50mg to 25mg first x 1 week.  IF BP still low after that, he can go to 12.5mg once daily.  We definitely want him on SOME beta blocker, given history of cardiac arrest and AFib, so I don't recommend stopping it completely.  Thanks, AB

## 2022-01-13 RX ORDER — METOPROLOL SUCCINATE 25 MG/1
12.5 TABLET, EXTENDED RELEASE ORAL 2 TIMES DAILY
Qty: 90 TABLET | Refills: 3 | Status: SHIPPED | OUTPATIENT
Start: 2022-01-13 | End: 2023-01-03

## 2022-01-13 NOTE — TELEPHONE ENCOUNTER
Called pt [phone]  Pt will send BP/HR logs via [employer] every few days for AB to review.   Pt states he is taking metoprolol 12.5mg BID, cutting current 50mg tabs into 4 pieces.  Pt concerned that wires to AICD not in place. Advised pt that device team sent him a message stating all is good with his AICD.   Pt request refill of metoprolol to [facility].   Refill placed per pt request.   Pt verbalized understanding.

## 2022-01-18 ENCOUNTER — PATIENT MESSAGE (OUTPATIENT)
Dept: CARDIOLOGY | Facility: PHYSICIAN GROUP | Age: 72
End: 2022-01-18

## 2022-01-18 ENCOUNTER — TELEPHONE (OUTPATIENT)
Dept: CARDIOLOGY | Facility: MEDICAL CENTER | Age: 72
End: 2022-01-18

## 2022-01-18 NOTE — TELEPHONE ENCOUNTER
"Called pt [phone]  Advised pt regarding OTC medication per \"over the counter medication standard of work\" recommends Rbutussin DM and avoid any additives of decongestants.   Pt verbalized understanding.   "

## 2022-01-18 NOTE — TELEPHONE ENCOUNTER
AB    Pt called stating he has a cough and sore throat and wants to know which medications would be safe for him to take with his heart condition. Please call Pt back at [phone].    Thank you

## 2022-01-21 ENCOUNTER — PATIENT MESSAGE (OUTPATIENT)
Dept: CARDIOLOGY | Facility: MEDICAL CENTER | Age: 72
End: 2022-01-21

## 2022-01-21 DIAGNOSIS — I42.0 DILATED CARDIOMYOPATHY (HCC): ICD-10-CM

## 2022-01-21 RX ORDER — AMIODARONE HYDROCHLORIDE 200 MG/1
200 TABLET ORAL 2 TIMES DAILY
Qty: 60 TABLET | Refills: 1 | Status: SHIPPED | OUTPATIENT
Start: 2022-01-21 | End: 2022-02-22

## 2022-01-21 RX ORDER — ENALAPRIL MALEATE 2.5 MG/1
2.5 TABLET ORAL EVERY EVENING
Qty: 30 TABLET | Refills: 0 | Status: CANCELLED | OUTPATIENT
Start: 2022-01-21

## 2022-01-21 RX ORDER — ENALAPRIL MALEATE 2.5 MG/1
2.5 TABLET ORAL EVERY EVENING
Qty: 30 TABLET | Refills: 0 | Status: SHIPPED | OUTPATIENT
Start: 2022-01-21 | End: 2022-01-24

## 2022-01-24 RX ORDER — ENALAPRIL MALEATE 2.5 MG/1
2.5 TABLET ORAL EVERY EVENING
Qty: 90 TABLET | Refills: 3 | Status: SHIPPED | OUTPATIENT
Start: 2022-01-24 | End: 2022-11-21

## 2022-02-09 ENCOUNTER — PATIENT MESSAGE (OUTPATIENT)
Dept: CARDIOLOGY | Facility: PHYSICIAN GROUP | Age: 72
End: 2022-02-09

## 2022-02-09 NOTE — PATIENT COMMUNICATION
Message  Received: Today  BÁRBARA Wells R.N.  I checked his transmission, and everything looks fine/stable.     It's also been over a month/six weeks since his device upgrade (to CRT-P), so he really is OK to move his arms without any restrictions.     Thanks, Nelida   -----------------------------------------

## 2022-02-09 NOTE — PATIENT COMMUNICATION
[employer],pacemaker     Mary Ellen Scanlon, Med Ass't routed conversation to You 1 minute ago (1:04 PM)     Pavan Cueva Amy, A.P.NFrancine 10 minutes ago (12:54 PM)     DC      Yes I sent it this morning   ----------------------------------

## 2022-02-16 ENCOUNTER — OFFICE VISIT (OUTPATIENT)
Dept: CARDIOLOGY | Facility: PHYSICIAN GROUP | Age: 72
End: 2022-02-16
Payer: MEDICARE

## 2022-02-16 ENCOUNTER — NON-PROVIDER VISIT (OUTPATIENT)
Dept: CARDIOLOGY | Facility: PHYSICIAN GROUP | Age: 72
End: 2022-02-16
Payer: MEDICARE

## 2022-02-16 VITALS
HEART RATE: 63 BPM | HEIGHT: 75 IN | SYSTOLIC BLOOD PRESSURE: 102 MMHG | DIASTOLIC BLOOD PRESSURE: 50 MMHG | BODY MASS INDEX: 25.22 KG/M2 | OXYGEN SATURATION: 95 % | RESPIRATION RATE: 16 BRPM | WEIGHT: 202.82 LBS

## 2022-02-16 VITALS
BODY MASS INDEX: 25.12 KG/M2 | SYSTOLIC BLOOD PRESSURE: 102 MMHG | RESPIRATION RATE: 16 BRPM | HEART RATE: 63 BPM | DIASTOLIC BLOOD PRESSURE: 50 MMHG | OXYGEN SATURATION: 95 % | WEIGHT: 202 LBS | HEIGHT: 75 IN

## 2022-02-16 DIAGNOSIS — Z98.890 HISTORY OF MITRAL VALVE REPAIR: ICD-10-CM

## 2022-02-16 DIAGNOSIS — I50.22 CHRONIC HFREF (HEART FAILURE WITH REDUCED EJECTION FRACTION) (HCC): ICD-10-CM

## 2022-02-16 DIAGNOSIS — Z79.01 CHRONIC ANTICOAGULATION: ICD-10-CM

## 2022-02-16 DIAGNOSIS — D50.9 IRON DEFICIENCY ANEMIA, UNSPECIFIED IRON DEFICIENCY ANEMIA TYPE: ICD-10-CM

## 2022-02-16 DIAGNOSIS — E03.9 HYPOTHYROIDISM, UNSPECIFIED TYPE: ICD-10-CM

## 2022-02-16 DIAGNOSIS — I48.0 PAROXYSMAL ATRIAL FIBRILLATION (HCC): ICD-10-CM

## 2022-02-16 DIAGNOSIS — E78.2 MIXED HYPERLIPIDEMIA: ICD-10-CM

## 2022-02-16 DIAGNOSIS — Z95.810 BIVENTRICULAR ICD (IMPLANTABLE CARDIOVERTER-DEFIBRILLATOR) IN PLACE: ICD-10-CM

## 2022-02-16 DIAGNOSIS — Z98.890 HISTORY OF TRICUSPID VALVE REPAIR: ICD-10-CM

## 2022-02-16 DIAGNOSIS — I44.2 AV BLOCK, COMPLETE (HCC): ICD-10-CM

## 2022-02-16 DIAGNOSIS — I46.9 CARDIAC ARREST (HCC): ICD-10-CM

## 2022-02-16 DIAGNOSIS — I42.0 DILATED CARDIOMYOPATHY (HCC): ICD-10-CM

## 2022-02-16 PROCEDURE — 99214 OFFICE O/P EST MOD 30 MIN: CPT | Performed by: NURSE PRACTITIONER

## 2022-02-16 PROCEDURE — 93284 PRGRMG EVAL IMPLANTABLE DFB: CPT | Performed by: NURSE PRACTITIONER

## 2022-02-16 RX ORDER — METOPROLOL SUCCINATE 50 MG/1
TABLET, EXTENDED RELEASE ORAL
COMMUNITY
Start: 2022-01-08 | End: 2022-02-16

## 2022-02-16 ASSESSMENT — ENCOUNTER SYMPTOMS
PND: 0
NAUSEA: 0
FEVER: 0
SHORTNESS OF BREATH: 0
PALPITATIONS: 0
BRUISES/BLEEDS EASILY: 0
HEADACHES: 0
CHILLS: 0
DIZZINESS: 0
ABDOMINAL PAIN: 0
ORTHOPNEA: 0
MYALGIAS: 0
LOSS OF CONSCIOUSNESS: 0
INSOMNIA: 0
COUGH: 0

## 2022-02-16 ASSESSMENT — FIBROSIS 4 INDEX
FIB4 SCORE: 1.956559480312315984
FIB4 SCORE: 1.956559480312315984

## 2022-02-16 NOTE — PROGRESS NOTES
Bruises (Contusions)  A contusion is a bruise. A bruise happens when a blow to your body doesn't break the skin but does break blood vessels beneath the skin. Blood leaking from the broken vessels causes redness and swelling. As it heals, your bruise is likely to turn colors like purple, green, and yellow. This is normal. The bruise should fade in 2 or 3 weeks.  Factors that make you more likely to bruise  Almost everyone bruises now and then. Certain people do bruise more easily than others. You're more prone to bruising as you get older. That's because blood vessels become more fragile with age. You're also more likely to bruise if you have a clotting disorder such as hemophilia or take medicines that reduce clotting, including aspirin and coumadin.  When to go to the emergency room (ER)  Bruises almost always heal on their own without special treatment. But for some people, a bad bruise can be serious. Seek medical care if you:    Have a clotting disorder such as hemophilia    Have cirrhosis or other serious liver disease    Take blood-thinning medicines such as warfarin  What to expect in the ER  A doctor will examine your bruise and ask about any health conditions you have. In some cases, you may have a test to check how well your blood clots. Other treatment will depend on your needs.  Follow-up care  Sometimes a bruise gets worse instead of better. It may become larger and more swollen. This can occur when your body walls off a small pool of blood under the skin (hematoma). In very rare cases, your doctor may need to drain extra blood from the area.  Tip:  Apply an ice pack or bag of frozen peas to a bruise. Keep a thin cloth between the ice or frozen peas and your skin. The cold can help reduce redness and swelling.       I feel this is a bruise but tick testing will be done.          Chief Complaint   Patient presents with   • Follow-Up   • Biventricular AICD   • Cardiac Arrest   • Cardiomyopathy (Non-ischemic)   • CHF (Compensated)   • AV Block Complete   • Atrial Fibrillation   • Anticoagulation       Subjective     Pavan Cueva is a 71 y.o. male who presents today for six week follow-up for final threshold checks of CRT-D    Pavan is a 71 year old male with history of MV/TV repair, MYRNA ligation, MAZE procedure and biatrial resizing done by Dr. Hines in May 2018 at [place], [place]. He has a prolonged recovery with bilateral pleural effusion, requiring thoracentesis on both sides. He did have PEA cardiac arrest, and PM implanted in July 2018.     A LYNETTE in June 2021 showed partial ring dehiscence of MV repair; he was evaluated by Dr. Loyd, and then referred back to Dr. Webb in [place]. He had redo MVR on 9/8/2021, which went well. He had been seen by Dr. Garza for evaluation of persistent atrial flutter; there were no plans for ablation until valve was fixed.     In December 2021, he had cardiac arrest at home, was shocked once and 1 round of CPR, and treated with Amiodarone. He presented to [facility] in [place], treated with IV Amiodarone and magnesium. On 12/22/2021, he was upgraded from PM to CRT-D, and started on Amiodarone.    I saw him for follow-up last month, and he was doing well.    He is here today for six week follow-up, for final threshold checks. BP is running 105-135 systolic at home; if BP is low at nighttime, he does NOT take his Enalapril. No chest pain, pressure, tightness or discomfort; no palpitations; no device therapy; no shortness of breath, orthopnea or PND; no dizziness or syncope; no LE edema. He is generally feeling well.     Past Medical History:   Diagnosis Date   • AF (atrial fibrillation) (HCC)         • Anemia    • AV block, complete (HCC)    • Cardiomyopathy, dilated (HCC)    • Chronic anticoagulation    • Heart valve disease    • Hemorrhagic disorder  (HCC)     History of epistaxis   • Hypertension    • ARIANA (obstructive sleep apnea)    • Renal mass    • Restrictive lung disease    • Sleep apnea     no cpap, 3L O2   • Thyroid disease      Past Surgical History:   Procedure Laterality Date   • MITRAL VALVE REPLACEMENT  2021    Redo MVR (#33 Angeli[address] Theon pericardial valve) by Dr. Chou, [place], [place]   • PACEMAKER INSERTION Left 2018    [employer] [place] XT DR MRI [id number] implanted by Dr. Gillette, [place], [place]   • MAZE PROCEDURE  2018   • TRICUSPID VALVE REPAIR  2018   • MITRAL VALVE REPAIR  2018     MV repair, TV repair, MYRNA ligation, MAZE procedure and biatrial resizing by Dr. Chou at [place], [place].   • MITRAL VALVE REPAIR     • OTHER      Nasal surgery for nosebleeds     Family History   Problem Relation Age of Onset   • Cancer Mother    • Stroke Father         CVA at 85, afib   • Heart Disease Father      Social History     Socioeconomic History   • Marital status:      Spouse name: Not on file   • Number of children: Not on file   • Years of education: Not on file   • Highest education level: Not on file   Occupational History   • Not on file   Tobacco Use   • Smoking status: Former Smoker     Packs/day: 0.00     Quit date: 1970     Years since quittin.1   • Smokeless tobacco: Never Used   Vaping Use   • Vaping Use: Never used   Substance and Sexual Activity   • Alcohol use: Yes     Comment: very little   • Drug use: Not Currently   • Sexual activity: Not on file   Other Topics Concern   • Not on file   Social History Narrative   • Not on file     Social Determinants of Health     Financial Resource Strain: Not on file   Food Insecurity: Not on file   Transportation Needs: Not on file   Physical Activity: Not on file   Stress: Not on file   Social Connections: Not on file   Intimate Partner Violence: Not on file   Housing Stability: Not on file     No Known Allergies  Outpatient Encounter  "Medications as of 2/16/2022   Medication Sig Dispense Refill   • enalapril (VASOTEC) 2.5 MG Tab TAKE 1 TABLET BY MOUTH EVERY EVENING 90 Tablet 3   • amiodarone (CORDARONE) 200 MG Tab Take 1 Tablet by mouth 2 times a day. 60 Tablet 1   • metoprolol SR (TOPROL XL) 25 MG TABLET SR 24 HR Take 0.5 Tablets by mouth 2 times a day. 90 Tablet 3   • Probiotic Product (PROBIOTIC-10) Chew Tab Chew.     • ferrous sulfate 325 (65 Fe) MG tablet Take 1 tablet by mouth.     • magnesium oxide (MAG-OX) 400 MG Tab tablet Take 1 tablet by mouth every day.     • potassium chloride (MICRO-K) 10 MEQ capsule Take 10 mEq by mouth.     • traZODone (DESYREL) 50 MG Tab Take 50 mg by mouth.     • Multiple Vitamin (MULTI-VITAMINS) Tab Take 1 tablet by mouth every day.     • levothyroxine (SYNTHROID) 75 MCG Tab Take 75 mcg by mouth every morning on an empty stomach.     • furosemide (LASIX) 40 MG Tab Take 40 mg by mouth every day.  2   • apixaban (ELIQUIS) 5mg Tab Take 5 mg by mouth 2 times a day.     • [DISCONTINUED] metoprolol SR (TOPROL XL) 50 MG TABLET SR 24 HR  (Patient not taking: Reported on 2/16/2022)       No facility-administered encounter medications on file as of 2/16/2022.     Review of Systems   Constitutional: Negative for chills and fever.   HENT: Negative for congestion.    Respiratory: Negative for cough and shortness of breath.    Cardiovascular: Negative for chest pain, palpitations, orthopnea, leg swelling and PND.   Gastrointestinal: Negative for abdominal pain and nausea.   Musculoskeletal: Negative for myalgias.   Skin: Negative for rash.   Neurological: Negative for dizziness, loss of consciousness and headaches.   Endo/Heme/Allergies: Does not bruise/bleed easily.   Psychiatric/Behavioral: The patient does not have insomnia.               Objective     /50 (BP Location: Left arm, Patient Position: Sitting, BP Cuff Size: Adult)   Pulse 63   Resp 16   Ht 1.905 m (6' 3\")   Wt 91.6 kg (202 lb)   SpO2 95%   BMI " 25.25 kg/m²     Physical Exam  Constitutional:       Appearance: He is well-developed.   HENT:      Head: Normocephalic.   Neck:      Vascular: No JVD.   Cardiovascular:      Rate and Rhythm: Normal rate and regular rhythm.      Heart sounds: Normal heart sounds.      Comments: AICD in left chest wall. Incision is well healed.  Well healed redo sternotomy incision.  Pulmonary:      Effort: Pulmonary effort is normal. No respiratory distress.      Breath sounds: Normal breath sounds. No wheezing or rales.   Abdominal:      General: Bowel sounds are normal. There is no distension.      Palpations: Abdomen is soft.      Tenderness: There is no abdominal tenderness.   Musculoskeletal:         General: Normal range of motion.      Cervical back: Normal range of motion and neck supple.   Skin:     General: Skin is warm and dry.      Findings: No rash.   Neurological:      Mental Status: He is alert and oriented to person, place, and time.     AICD interrogation today reveals normal function. NO mode switching episodes and no device therapy. RV lead output is lowered slightly.    Interpretation Summary of Echocardiogram of 12/21/2021:  There is mild asymmetric left ventricular hypertrophy. Left ventricular systolic function is  severely reduced. The Ejection Fraction estimate is <20%  The right ventricular systolic function is moderate to severely reduced. TAPSE 7mm  The left and right artia are severely dilated  There is no Doppler evidence for an interatrial shunt  There is a bioprosthetic mitral valve. Leaflets are thin and open well but MG is elevated 8 mmHg.  There is mild intravalvular regurgitation through the mitral prosthesis.  There is mild tricuspid regurgitation. An annuloplasty ring is noted in the tricuspid position.  Right ventricular systolic pressure is elevated at 55-60 mmHg  There is no pericardial effusion.      RESULTS OF LABS OF 12/21/2021:  WBC Count 3.7 - 10.6 x10E3/uL 6.3    RBC 4.50 - 5.70  x10E6/uL 3.84 Low     Hemoglobin 13.0 - 16.7 g/dL 10.9 Low     Hematocrit 38.8 - 49.7 % 32.1 Low     Mean Corpuscular Volume 83.0 - 99.0 fL 83.7    Mean Corpuscular Hemoglobin 28.0 - 33.8 pg 28.4    Mean Corpuscular HGB Concentration 33.1 - 36.5 g/dL 34.0    Red Cell Distribution Width 11.8 - 14.0 % 15.5 High     Platelet Count 146 - 390 x10E3/uL 200    Mean Platelet Volume 6.4 - 10.2 fL 8.0    Neutrophil % 41.7 - 82.3 % 77.5    Lymphocyte % 10.8 - 44.4 % 15.2    Monocyte % 5.0 - 12.8 % 6.1    Eosinophil % 0.0 - 6.6 % 0.6    Basophil % 0.0 - 1.3 % 0.6    Neutrophil # 1.40 - 8.00 x10E3/uL 4.90    Lymphocyte # 1.00 - 5.20 x10E3/uL 1.00    Monocyte # 0.16 - 1.00 x10E3/uL 0.40    Eosinophil # 0.00 - 0.80 x10E3/uL 0.00    Basophil # 0.00 - 0.30 x10E3/uL 0.00      Sodium S/P 136 - 144 mmol/L 134 Low     Potassium 3.6 - 5.1 mmol/L 4.6    Chloride 102 - 110 mmol/L 105    Carbon Dioxide 22 - 32 mmol/L 24    BUN 8 - 20 mg/dL 26 High     Creatinine S/P 0.80 - 1.40 mg/dL 0.86    Calcium 8.7 - 10.3 mg/dL 8.6 Low     Anion Gap 2 - 11 mmol/L 5    Glomerular Filtration Rate (GFR) >60 mL/min/1.7 88      TSH 0.34 - 5.60 uIU/mL 7.79 High             Assessment & Plan     1. Biventricular ICD (implantable cardioverter-defibrillator) in place     2. Cardiac arrest (HCC)     3. Dilated cardiomyopathy (HCC)     4. Chronic HFrEF (heart failure with reduced ejection fraction) (HCC)     5. AV block, complete (HCC)     6. Paroxysmal atrial fibrillation (HCC)     7. Chronic anticoagulation     8. History of mitral valve repair     9. History of tricuspid valve repair     10. Hypothyroidism, unspecified type     11. Iron deficiency anemia, unspecified iron deficiency anemia type         Medical Decision Making: Today's Assessment/Status/Plan:       1. History of cardiac arrest, with dilated cardiomyopathy with LVEF 20%, now with CRT-D. The device is working optimally. We will repeat echo in April 2022.  Continue:  Eliquis 5mg BID  Lasix 40mg  once daily  Amiodarione 200mg BID  Vasotec 2.5mg once daily  Toprol XL 12.5mg BID  KCl 10mEq once daily    2. Paroxysmal atrial fibrillation, in sinus rhythm on Amiodarone. No mode switching episodes.    3. Chronic anticoagulation with Eliquis. No bleeding problems.    4. History of MV repair and TV repair, normal function on echo in December 2021.    5. Hypothyroidism, treated, with elevated TSH. To repeat TSH.    6. Anemia, to repeat CBC.    7. History of hyperlipidemia, to repeat fasting lipid panel.    Plan as above: Repeat echo in April 2022. He is reassured regarding normal function of device. Same medications for now. Follow-up with me in May 2022.

## 2022-03-02 LAB
BASOPHILS # BLD AUTO: 0 X10E3/UL (ref 0–0.2)
BASOPHILS NFR BLD AUTO: 1 %
BUN SERPL-MCNC: 20 MG/DL (ref 8–27)
BUN/CREAT SERPL: 18 (ref 10–24)
CALCIUM SERPL-MCNC: 9.2 MG/DL (ref 8.6–10.2)
CHLORIDE SERPL-SCNC: 100 MMOL/L (ref 96–106)
CHOLEST SERPL-MCNC: 165 MG/DL (ref 100–199)
CO2 SERPL-SCNC: 28 MMOL/L (ref 20–29)
CREAT SERPL-MCNC: 1.13 MG/DL (ref 0.76–1.27)
EGFRCR SERPLBLD CKD-EPI 2021: 70 ML/MIN/1.73
EOSINOPHIL # BLD AUTO: 0.1 X10E3/UL (ref 0–0.4)
EOSINOPHIL NFR BLD AUTO: 2 %
ERYTHROCYTE [DISTWIDTH] IN BLOOD BY AUTOMATED COUNT: 15 % (ref 11.6–15.4)
GLUCOSE SERPL-MCNC: 95 MG/DL (ref 65–99)
HCT VFR BLD AUTO: 35.5 % (ref 37.5–51)
HDLC SERPL-MCNC: 59 MG/DL
HGB BLD-MCNC: 11.7 G/DL (ref 13–17.7)
IMM GRANULOCYTES # BLD AUTO: 0 X10E3/UL (ref 0–0.1)
IMM GRANULOCYTES NFR BLD AUTO: 1 %
IMMATURE CELLS  115398: ABNORMAL
LABORATORY COMMENT REPORT: NORMAL
LDLC SERPL CALC-MCNC: 94 MG/DL (ref 0–99)
LYMPHOCYTES # BLD AUTO: 0.9 X10E3/UL (ref 0.7–3.1)
LYMPHOCYTES NFR BLD AUTO: 16 %
MCH RBC QN AUTO: 29.7 PG (ref 26.6–33)
MCHC RBC AUTO-ENTMCNC: 33 G/DL (ref 31.5–35.7)
MCV RBC AUTO: 90 FL (ref 79–97)
MONOCYTES # BLD AUTO: 0.4 X10E3/UL (ref 0.1–0.9)
MONOCYTES NFR BLD AUTO: 8 %
MORPHOLOGY BLD-IMP: ABNORMAL
NEUTROPHILS # BLD AUTO: 4.3 X10E3/UL (ref 1.4–7)
NEUTROPHILS NFR BLD AUTO: 72 %
NRBC BLD AUTO-RTO: ABNORMAL %
PLATELET # BLD AUTO: 176 X10E3/UL (ref 150–450)
POTASSIUM SERPL-SCNC: 4.6 MMOL/L (ref 3.5–5.2)
RBC # BLD AUTO: 3.94 X10E6/UL (ref 4.14–5.8)
SODIUM SERPL-SCNC: 140 MMOL/L (ref 134–144)
TRIGL SERPL-MCNC: 59 MG/DL (ref 0–149)
TSH SERPL DL<=0.005 MIU/L-ACNC: 18.3 UIU/ML (ref 0.45–4.5)
VLDLC SERPL CALC-MCNC: 12 MG/DL (ref 5–40)
WBC # BLD AUTO: 5.9 X10E3/UL (ref 3.4–10.8)

## 2022-03-10 ENCOUNTER — PATIENT MESSAGE (OUTPATIENT)
Dept: CARDIOLOGY | Facility: PHYSICIAN GROUP | Age: 72
End: 2022-03-10
Payer: MEDICARE

## 2022-03-14 NOTE — PATIENT COMMUNICATION
Faxed order to [facility] [phone]  Confirmation status received  Scan to [facility]

## 2022-03-18 ENCOUNTER — HOSPITAL ENCOUNTER (OUTPATIENT)
Dept: CARDIOLOGY | Facility: MEDICAL CENTER | Age: 72
End: 2022-03-18
Attending: NURSE PRACTITIONER
Payer: MEDICARE

## 2022-03-18 DIAGNOSIS — I42.0 DILATED CARDIOMYOPATHY (HCC): ICD-10-CM

## 2022-03-18 LAB
LV EJECT FRACT  99904: 25
LV EJECT FRACT MOD 2C 99903: 16.92
LV EJECT FRACT MOD 4C 99902: 31.39
LV EJECT FRACT MOD BP 99901: 20.09

## 2022-03-18 PROCEDURE — 93306 TTE W/DOPPLER COMPLETE: CPT

## 2022-03-18 PROCEDURE — 93306 TTE W/DOPPLER COMPLETE: CPT | Mod: 26 | Performed by: INTERNAL MEDICINE

## 2022-03-21 ENCOUNTER — PATIENT MESSAGE (OUTPATIENT)
Dept: CARDIOLOGY | Facility: MEDICAL CENTER | Age: 72
End: 2022-03-21
Payer: MEDICARE

## 2022-03-21 DIAGNOSIS — I48.0 PAROXYSMAL ATRIAL FIBRILLATION (HCC): ICD-10-CM

## 2022-03-21 DIAGNOSIS — E03.9 HYPOTHYROIDISM, UNSPECIFIED TYPE: ICD-10-CM

## 2022-03-21 RX ORDER — AMIODARONE HYDROCHLORIDE 200 MG/1
TABLET ORAL
Qty: 180 TABLET | Refills: 1 | OUTPATIENT
Start: 2022-03-21

## 2022-03-21 NOTE — TELEPHONE ENCOUNTER
----- Message from Gladys Crockett R.N. sent at 3/21/2022  9:06 AM PDT -----  Please advise on what to say to ptFrancine MUSTAFA with you 5/25/22. Thank you.

## 2022-03-21 NOTE — PATIENT COMMUNICATION
Please let patient know that echocardiogram shows very slight improvement in LVEF (previously <20%, now 20-25%). RVSP/right sided heart pressure is modestly improved.  Important to keep taking same medications for now, and keep follow-up in May 2022 as scheduled.  --------------------------  Results sent to pt via my chart

## 2022-03-21 NOTE — TELEPHONE ENCOUNTER
----- Message from BÁRBARA Wells sent at 3/21/2022 11:03 AM PDT -----  Labs are all OK, except CBC shows slightly more anemia. Suggest repeat CBC in 1 month.  TSH Is very high - suggest increasing Synthroid from 75mcg to 150mcg (unless he wants his PCP to address). Repeat TSH in 1 month.  Thanks, AB

## 2022-03-25 DIAGNOSIS — I48.0 PAROXYSMAL ATRIAL FIBRILLATION (HCC): ICD-10-CM

## 2022-03-25 DIAGNOSIS — I50.22 CHRONIC HFREF (HEART FAILURE WITH REDUCED EJECTION FRACTION) (HCC): ICD-10-CM

## 2022-03-25 DIAGNOSIS — Z79.899 HIGH RISK MEDICATION USE: ICD-10-CM

## 2022-03-25 DIAGNOSIS — I42.0 DILATED CARDIOMYOPATHY (HCC): ICD-10-CM

## 2022-03-25 RX ORDER — DIGOXIN 125 MCG
125 TABLET ORAL DAILY
Qty: 90 TABLET | Refills: 3 | Status: SHIPPED | OUTPATIENT
Start: 2022-03-25 | End: 2023-03-23

## 2022-04-07 LAB — DIGOXIN SERPL-MCNC: 1.3 NG/ML (ref 0.5–0.9)

## 2022-04-25 ENCOUNTER — TELEPHONE (OUTPATIENT)
Dept: CARDIOLOGY | Facility: MEDICAL CENTER | Age: 72
End: 2022-04-25
Payer: MEDICARE

## 2022-04-25 NOTE — TELEPHONE ENCOUNTER
AB      Pt called in and stated that when he talked to the pharmacist at [facility], the pharmacist said that the pt should not be taking the medication amiodarone (CORDARONE) 200 MG Tab and the medication digoxin (LANOXIN) 125 MCG Tab together.     Also, pt stated that he has had pain on his left side by his rib cage for the past couple days and wanted to inform AB of this.     Best contact for pt:  PH: [phone]      Thank you,  Nell DAN

## 2022-04-27 ENCOUNTER — TELEPHONE (OUTPATIENT)
Dept: CARDIOLOGY | Facility: MEDICAL CENTER | Age: 72
End: 2022-04-27

## 2022-04-27 NOTE — TELEPHONE ENCOUNTER
Called pt [phone]  Explained to pt that I have not heard back for AB regarding mediation question. Pt states he would like an appt for FU with SW instead of AB. Pt states SW was a referral from [facility]. Advised pt that I will check SW schedule and call him back.        To Marcia SPRINGER   appts available for SW on 5/5/22? Please advise and thank you

## 2022-04-27 NOTE — TELEPHONE ENCOUNTER
AB      Pt called in again and still has concerns regarding 2 medications being taken together. Pt stated that when he talked to the pharmacist at [facility], the pharmacist said that the pt should not be taking the medication amiodarone (CORDARONE) 200 MG Tab and the medication digoxin (LANOXIN) 125 MCG Tab together.      Also, pt stated that he has had pain on his left side by his rib cage for the past couple days and wanted to inform AB of this.      Best contact for pt:  PH: [phone]      Thank you,  Nell DAN

## 2022-05-02 NOTE — TELEPHONE ENCOUNTER
Patient was discharged from the hospital in December 2021 on both.  Sounds like he prefers to see SW; can you please direct question to him.  Thanks, AB

## 2022-05-03 NOTE — TELEPHONE ENCOUNTER
I have not seen this patient but has an upcoming appointment in 3 days  I reviewed his chart and his digoxin level is slightly elevated at 1.3.  He is on amiodarone and digoxin 0.125 mg    He will need an EKG at the time of his follow-up    Please notify the patient to start taking digoxin every other day

## 2022-05-03 NOTE — TELEPHONE ENCOUNTER
Called pt [phone]  Relayed SW recommendation to take digoxin every other day. Pt states he took this medication today, but will start new order tomorrow. Pt verbalized understanding.

## 2022-05-05 ENCOUNTER — TELEPHONE (OUTPATIENT)
Dept: CARDIOLOGY | Facility: MEDICAL CENTER | Age: 72
End: 2022-05-05

## 2022-05-05 ENCOUNTER — OFFICE VISIT (OUTPATIENT)
Dept: CARDIOLOGY | Facility: MEDICAL CENTER | Age: 72
End: 2022-05-05
Payer: MEDICARE

## 2022-05-05 VITALS
RESPIRATION RATE: 16 BRPM | OXYGEN SATURATION: 100 % | DIASTOLIC BLOOD PRESSURE: 70 MMHG | WEIGHT: 205.4 LBS | HEIGHT: 75 IN | SYSTOLIC BLOOD PRESSURE: 118 MMHG | BODY MASS INDEX: 25.54 KG/M2 | HEART RATE: 63 BPM

## 2022-05-05 DIAGNOSIS — Z95.3 S/P MITRAL VALVE REPLACEMENT WITH BIOPROSTHETIC VALVE: ICD-10-CM

## 2022-05-05 DIAGNOSIS — I48.0 PAROXYSMAL ATRIAL FIBRILLATION (HCC): ICD-10-CM

## 2022-05-05 DIAGNOSIS — Z98.890 HISTORY OF MITRAL VALVE REPAIR: ICD-10-CM

## 2022-05-05 DIAGNOSIS — Z79.01 CHRONIC ANTICOAGULATION: ICD-10-CM

## 2022-05-05 DIAGNOSIS — I10 ESSENTIAL HYPERTENSION, BENIGN: ICD-10-CM

## 2022-05-05 DIAGNOSIS — Z79.899 ON AMIODARONE THERAPY: ICD-10-CM

## 2022-05-05 DIAGNOSIS — I50.22 CHRONIC HFREF (HEART FAILURE WITH REDUCED EJECTION FRACTION) (HCC): ICD-10-CM

## 2022-05-05 DIAGNOSIS — Z98.890 HISTORY OF TRICUSPID VALVE REPAIR: ICD-10-CM

## 2022-05-05 LAB — EKG IMPRESSION: NORMAL

## 2022-05-05 PROCEDURE — 99214 OFFICE O/P EST MOD 30 MIN: CPT | Mod: 25 | Performed by: INTERNAL MEDICINE

## 2022-05-05 PROCEDURE — 93000 ELECTROCARDIOGRAM COMPLETE: CPT | Performed by: INTERNAL MEDICINE

## 2022-05-05 RX ORDER — LEVOTHYROXINE SODIUM 125 UG/1
112 CAPSULE ORAL DAILY
COMMUNITY
Start: 2022-04-25 | End: 2023-03-23

## 2022-05-05 RX ORDER — LEVOTHYROXINE SODIUM 0.1 MG/1
TABLET ORAL
COMMUNITY
Start: 2022-03-03 | End: 2022-05-05

## 2022-05-05 ASSESSMENT — ENCOUNTER SYMPTOMS
ORTHOPNEA: 0
BLURRED VISION: 0
PALPITATIONS: 0
MYALGIAS: 0
INSOMNIA: 0
CHILLS: 0
SHORTNESS OF BREATH: 0
LOSS OF CONSCIOUSNESS: 0
ABDOMINAL PAIN: 0
DIZZINESS: 0
PND: 0
FEVER: 0

## 2022-05-05 ASSESSMENT — FIBROSIS 4 INDEX: FIB4 SCORE: 1.98

## 2022-05-05 NOTE — PROGRESS NOTES
Chief Complaint   Patient presents with   • AV Block Complete   • Atrial Fibrillation     Paroxysmal atrial fibrillation (HCC)       • Cardiomyopathy (Non-ischemic)       Subjective     Pavan Cueva is a 72 y.o. male who presents today transitioning care from [facility] cardiology back to [facility].    The patient has significant past medical history of S/P mitral valve repair, tricuspid valve repair, MAZE, MYRNA ligation, PPM 7/13/2018 ([place], [place]), PAF, redo S/P mitral valve replacement (33 mm Angeli Underwood Theon pericardial valve) 9/8/2021 ([facility]), cardiac arrest 12/20/2021 ([facility]), CRT-D 12/22/2021 ([facility]), nonischemic cardiomyopathy, HFrEF 25%, amiodarone therapy, hypothyroidism (amiodarone induced), anticoagulation, hypertension, ARIANA.    Clinically the patient is doing exceptionally well.  He remains very active, works on outdoor projects and involved in various [employer] activities.  He has no symptoms of shortness of breath, PND, orthopnea or lower extremity edema.    He takes enalapril each evening based on his blood pressure readings which he diligently monitors.  He has been on amiodarone 400 mg daily since his hospitalization in 12/2021.  He has developed hypothyroidism, started on thyroid supplementation and sees an endocrinologist in [place].    Past Medical History:   Diagnosis Date   • AF (atrial fibrillation) ([facility])         • Anemia    • AV block, complete (HCC)    • Cardiomyopathy, dilated (HCC) 03/2022    Echocardiogram with severely dilated LV, LVEF 20-25%. Global hypokinesis. Normal RV. Severely dilated LA and RV. MV prothesis with elevated transvalvular gradient. Trace AR, mild TR. RVSP 47mmHg.   • Chronic anticoagulation    • Heart valve disease    • Hemorrhagic disorder (HCC)     History of epistaxis   • Hypertension    • ARIANA (obstructive sleep apnea)    • Renal mass    • Restrictive lung disease     • Sleep apnea     no cpap, 3L O2   • Thyroid disease      Past Surgical History:   Procedure Laterality Date   • MITRAL VALVE REPLACEMENT  2021    Redo MVR (#33 Angeli-Underwood Theon pericardial valve) by Dr. Chou, [place], [place]   • PACEMAKER INSERTION Left 2018    Medtronic [place] XT DR JIN [id number] implanted by Dr. Gillette, [place], [place]   • MAZE PROCEDURE  2018   • TRICUSPID VALVE REPAIR  2018   • MITRAL VALVE REPAIR  2018     MV repair, TV repair, MYRNA ligation, MAZE procedure and biatrial resizing by Dr. Chou at [place], [place].   • MITRAL VALVE REPAIR     • OTHER      Nasal surgery for nosebleeds     Family History   Problem Relation Age of Onset   • Cancer Mother    • Stroke Father         CVA at 85, afib   • Heart Disease Father      Social History     Socioeconomic History   • Marital status:      Spouse name: Not on file   • Number of children: Not on file   • Years of education: Not on file   • Highest education level: Not on file   Occupational History   • Not on file   Tobacco Use   • Smoking status: Former Smoker     Packs/day: 0.00     Quit date:      Years since quittin.3   • Smokeless tobacco: Never Used   Vaping Use   • Vaping Use: Never used   Substance and Sexual Activity   • Alcohol use: Yes     Comment: very little   • Drug use: Not Currently   • Sexual activity: Not on file   Other Topics Concern   • Not on file   Social History Narrative   • Not on file     Social Determinants of Health     Financial Resource Strain: Not on file   Food Insecurity: Not on file   Transportation Needs: Not on file   Physical Activity: Not on file   Stress: Not on file   Social Connections: Not on file   Intimate Partner Violence: Not on file   Housing Stability: Not on file     No Known Allergies  Outpatient Encounter Medications as of 2022   Medication Sig Dispense Refill   • levothyroxine (SYNTHROID) 112 MCG Tab Take 112 mcg by mouth every  day.     • digoxin (LANOXIN) 125 MCG Tab Take 1 Tablet by mouth every day. 90 Tablet 3   • amiodarone (CORDARONE) 200 MG Tab Take 1 Tablet by mouth 2 times a day. 180 Tablet 1   • enalapril (VASOTEC) 2.5 MG Tab TAKE 1 TABLET BY MOUTH EVERY EVENING (Patient taking differently: Take 2.5 mg by mouth as needed.) 90 Tablet 3   • metoprolol SR (TOPROL XL) 25 MG TABLET SR 24 HR Take 0.5 Tablets by mouth 2 times a day. 90 Tablet 3   • Probiotic Product (PROBIOTIC-10) Chew Tab Chew 1 Tablet every day.     • ferrous sulfate 325 (65 Fe) MG tablet Take 1 Tablet by mouth every 48 hours.     • magnesium oxide (MAG-OX) 400 MG Tab tablet Take 1 tablet by mouth every day.     • potassium chloride (MICRO-K) 10 MEQ capsule Take 10 mEq by mouth.     • traZODone (DESYREL) 50 MG Tab Take 50 mg by mouth as needed.     • Multiple Vitamin (MULTI-VITAMINS) Tab Take 1 tablet by mouth every day.     • furosemide (LASIX) 40 MG Tab Take 40 mg by mouth every day.  2   • apixaban (ELIQUIS) 5mg Tab Take 5 mg by mouth 2 times a day.     • [DISCONTINUED] DIGOXIN PO      • [DISCONTINUED] levothyroxine (SYNTHROID) 100 MCG Tab  (Patient not taking: Reported on 5/5/2022)     • [DISCONTINUED] levothyroxine (SYNTHROID) 75 MCG Tab Take 75 mcg by mouth every morning on an empty stomach.       No facility-administered encounter medications on file as of 5/5/2022.     Review of Systems   Constitutional: Negative for chills and fever.   HENT: Negative for congestion.    Eyes: Negative for blurred vision.   Respiratory: Negative for shortness of breath.    Cardiovascular: Negative for chest pain, palpitations, orthopnea, leg swelling and PND.   Gastrointestinal: Negative for abdominal pain.   Genitourinary: Negative for dysuria.   Musculoskeletal: Negative for joint pain and myalgias.   Skin: Negative for rash.   Neurological: Negative for dizziness and loss of consciousness.   Psychiatric/Behavioral: The patient does not have insomnia.               Objective  "    /70 (BP Location: Left arm, Patient Position: Sitting, BP Cuff Size: Adult)   Pulse 63   Resp 16   Ht 1.905 m (6' 3\")   Wt 93.2 kg (205 lb 6.4 oz)   SpO2 100%   BMI 25.67 kg/m²     Physical Exam  Vitals reviewed.   Constitutional:       General: He is not in acute distress.     Appearance: He is well-developed.   Eyes:      Conjunctiva/sclera: Conjunctivae normal.      Pupils: Pupils are equal, round, and reactive to light.   Neck:      Vascular: No JVD.   Cardiovascular:      Rate and Rhythm: Normal rate and regular rhythm.      Pulses:           Carotid pulses are 2+ on the right side and 2+ on the left side.     Heart sounds: Murmur heard.     No friction rub. No gallop.      Comments: Midline scar  Generator left subclavian area  Pulmonary:      Effort: Pulmonary effort is normal. No accessory muscle usage or respiratory distress.      Breath sounds: Normal breath sounds. No wheezing or rales.   Abdominal:      General: There is no distension.      Palpations: Abdomen is soft. There is no mass.      Tenderness: There is no abdominal tenderness.   Musculoskeletal:      Cervical back: Normal range of motion and neck supple.      Right lower leg: No edema.      Left lower leg: No edema.   Skin:     General: Skin is warm and dry.      Findings: No rash.      Nails: There is no clubbing.   Neurological:      Mental Status: He is alert and oriented to person, place, and time.   Psychiatric:         Behavior: Behavior normal.              ECHOCARDIOGRAM 3/18/2022  Severely reduced left ventricular systolic function.   The left ventricular ejection fraction is visually estimated to be 20-25%.   Grade III diastolic dysfunction (restrictive pattern).  Known mitral valve bioprosthesis with elevated transvalvular gradient.  Estimated right ventricular systolic pressure is 47 mmHg; mild pulmonary hypertension.   No pericardial effusion.    EKG 5/5/2021 AV paced rate 61 personally interpreted.    Assessment & " Plan     1. Chronic HFrEF (heart failure with reduced ejection fraction) (HCC)  B TYPE NATRIURETIC    DIGOXIN    EC-ECHOCARDIOGRAM COMPLETE W/O CONT   2. History of tricuspid valve repair     3. Paroxysmal atrial fibrillation (HCC)  EKG    Comp Metabolic Panel    CBC WITHOUT DIFFERENTIAL    REFERRAL TO CARDIOLOGY   4. Chronic anticoagulation     5. History of mitral valve repair     6. S/P mitral valve replacement with bioprosthetic valve     7. Essential hypertension, benign     8. On amiodarone therapy         Medical Decision Making: Today's Assessment/Status/Plan:   Assessment  1.  HFrEF 25%.  2.  Nonischemic cardiomyopathy  3.  S/P MVR (33 mm Angeli Underwood Theon pericardial valve) 9/8/2021 ([facility], [place]).  4.  S/P mitral valve repair, tricuspid valve repair, MAZE, MYRNA ligation, PPM 7/13/2018 ([place], [place])  5.  PAF.  6.  Amiodarone therapy, started 12/2021.  7.  Anticoagulation, apixaban.  8.  Hypothyroidism, amiodarone induced.  9.  Hypertension.  10.  ARIANA    Recommendation Discussion  1.  HFrEF: Clinically stable, euvolemic, continue furosemide, enalapril, digoxin, metoprolol, will get CMP, BNP then review for addition of empagliflozin and spironolactone, will continue enalapril since with low BP most likely would not tolerate Entresto, follow-up echocardiogram, CMP, digoxin level.  2.  PAF: Continue apixaban, metoprolol, instructed patient to reduce amiodarone from 400 mg daily down to 200 mg daily, refer to EP.  3.  MVR: Recent echocardiogram images reviewed, functioning normally, continue to monitor.  4.  Amiodarone: Amiodarone is a high risk medication, as developed related hypothyroidism, requiring surveillance for liver and lung toxicity, will need, liver function every 6 months, annual CXR, optimally baseline PFTs and close follow up for respiratory symptoms, depending on EP recommendations.  5.  CRT-D: Reviewed device interrogation A paced 10%, V paced 100%,  enroll in EP clinic for surveillance and necessary adjustment, may benefit by increased basal heart rate.  6.  Hypertension: BP normal, goal, continue enalapril, metoprolol.  7.  RTC 3 months.

## 2022-05-10 DIAGNOSIS — I48.0 PAROXYSMAL ATRIAL FIBRILLATION (HCC): ICD-10-CM

## 2022-05-16 ENCOUNTER — OFFICE VISIT (OUTPATIENT)
Dept: CARDIOLOGY | Facility: MEDICAL CENTER | Age: 72
End: 2022-05-16
Attending: INTERNAL MEDICINE
Payer: MEDICARE

## 2022-05-16 VITALS
HEIGHT: 75 IN | RESPIRATION RATE: 16 BRPM | SYSTOLIC BLOOD PRESSURE: 120 MMHG | OXYGEN SATURATION: 97 % | DIASTOLIC BLOOD PRESSURE: 82 MMHG | HEART RATE: 75 BPM | BODY MASS INDEX: 25.61 KG/M2 | WEIGHT: 206 LBS

## 2022-05-16 DIAGNOSIS — I50.22 CHRONIC HFREF (HEART FAILURE WITH REDUCED EJECTION FRACTION) (HCC): ICD-10-CM

## 2022-05-16 DIAGNOSIS — Z79.01 CHRONIC ANTICOAGULATION: ICD-10-CM

## 2022-05-16 DIAGNOSIS — Z95.0 BIVENTRICULAR CARDIAC PACEMAKER IN SITU: ICD-10-CM

## 2022-05-16 DIAGNOSIS — I48.0 PAROXYSMAL ATRIAL FIBRILLATION (HCC): ICD-10-CM

## 2022-05-16 DIAGNOSIS — I44.2 AV BLOCK, COMPLETE (HCC): ICD-10-CM

## 2022-05-16 PROCEDURE — 93000 ELECTROCARDIOGRAM COMPLETE: CPT | Mod: 59 | Performed by: INTERNAL MEDICINE

## 2022-05-16 PROCEDURE — 99214 OFFICE O/P EST MOD 30 MIN: CPT | Mod: 25 | Performed by: INTERNAL MEDICINE

## 2022-05-16 PROCEDURE — 93284 PRGRMG EVAL IMPLANTABLE DFB: CPT | Performed by: INTERNAL MEDICINE

## 2022-05-16 ASSESSMENT — FIBROSIS 4 INDEX: FIB4 SCORE: 1.98

## 2022-05-16 NOTE — PROGRESS NOTES
Arrhythmia Clinic Note (Established patient)    DOS: 5/16/2022    Chief complaint/Reason for consult:  F/u CRT-D/VT    Interval History:  Pt is a 73 yo M. He has a history of valve disease s/p prior tricuspid repair and MVR ring along with MAZE and MYRNA excision. Subsequently referred to me for atypical flutter. We were planning on ablation, pre-procedure LYNETTE showed a dehisced mitral valve ring floating in the LA. Referred back to CT surgery, last fall underwent repeat mitral valve surgery now with tissue valve replacement. LV function is severely depressed LVEF 20-25%, NYHA II symptoms. He had a cardiac arrest due to VT in late 2021, underwent upgrade of his device to CRT-D with Dr. Gutierrez at [facility]. Has been on high dose amiodarone, causing some thyroid suppression, recently lowered to 200 daily. Referred back to EP for follow-up.    ROS (+in BOLD):  General--Negative for fatigue, weight loss or weight gain  Cardiovascular--Negative for CP, orthopnea, PND    Past Medical History:   Diagnosis Date   • AF (atrial fibrillation) ([facility])         • Anemia    • AV block, complete ([facility])    • Cardiomyopathy, dilated ([facility]) 03/2022    Echocardiogram with severely dilated LV, LVEF 20-25%. Global hypokinesis. Normal RV. Severely dilated LA and RV. MV prothesis with elevated transvalvular gradient. Trace AR, mild TR. RVSP 47mmHg.   • Chronic anticoagulation    • Heart valve disease    • Hemorrhagic disorder (HCC)     History of epistaxis   • Hypertension    • ARIANA (obstructive sleep apnea)    • Renal mass    • Restrictive lung disease    • Sleep apnea     no cpap, 3L O2   • Thyroid disease        Past Surgical History:   Procedure Laterality Date   • MITRAL VALVE REPLACEMENT  09/08/2021    Redo MVR (#33 Angeli-Underwood Theon pericardial valve) by Hemalatha Almanza, [place], [place]   • PACEMAKER INSERTION Left 07/13/2018    Medtronic [place] XT DR JIN [id number] implanted by Hemalatha Mireles, [place], [place]   • MAZE PROCEDURE  05/2018    • TRICUSPID VALVE REPAIR  2018   • MITRAL VALVE REPAIR  2018     MV repair, TV repair, MYRNA ligation, MAZE procedure and biatrial resizing by Dr. Chou at [place], [place].   • MITRAL VALVE REPAIR     • OTHER      Nasal surgery for nosebleeds       Social History     Socioeconomic History   • Marital status:      Spouse name: Not on file   • Number of children: Not on file   • Years of education: Not on file   • Highest education level: Not on file   Occupational History   • Not on file   Tobacco Use   • Smoking status: Former Smoker     Packs/day: 0.00     Quit date:      Years since quittin.4   • Smokeless tobacco: Never Used   Vaping Use   • Vaping Use: Never used   Substance and Sexual Activity   • Alcohol use: Yes     Comment: very little   • Drug use: Not Currently   • Sexual activity: Not on file   Other Topics Concern   • Not on file   Social History Narrative   • Not on file     Social Determinants of Health     Financial Resource Strain: Not on file   Food Insecurity: Not on file   Transportation Needs: Not on file   Physical Activity: Not on file   Stress: Not on file   Social Connections: Not on file   Intimate Partner Violence: Not on file   Housing Stability: Not on file       Family History   Problem Relation Age of Onset   • Cancer Mother    • Stroke Father         CVA at 85, afib   • Heart Disease Father        No Known Allergies    Current Outpatient Medications   Medication Sig Dispense Refill   • levothyroxine (SYNTHROID) 112 MCG Tab Take 112 mcg by mouth every day.     • digoxin (LANOXIN) 125 MCG Tab Take 1 Tablet by mouth every day. 90 Tablet 3   • amiodarone (CORDARONE) 200 MG Tab Take 1 Tablet by mouth 2 times a day. 180 Tablet 1   • enalapril (VASOTEC) 2.5 MG Tab TAKE 1 TABLET BY MOUTH EVERY EVENING (Patient taking differently: Take 2.5 mg by mouth as needed in the morning.) 90 Tablet 3   • metoprolol SR (TOPROL XL) 25 MG TABLET SR 24 HR Take 0.5 Tablets by mouth  "2 times a day. 90 Tablet 3   • Probiotic Product (PROBIOTIC-10) Chew Tab Chew 1 Tablet every day.     • ferrous sulfate 325 (65 Fe) MG tablet Take 1 Tablet by mouth every 48 hours.     • magnesium oxide (MAG-OX) 400 MG Tab tablet Take 1 tablet by mouth every day.     • potassium chloride (MICRO-K) 10 MEQ capsule Take 10 mEq by mouth.     • traZODone (DESYREL) 50 MG Tab Take 50 mg by mouth as needed.     • Multiple Vitamin (MULTI-VITAMINS) Tab Take 1 tablet by mouth every day.     • furosemide (LASIX) 40 MG Tab Take 40 mg by mouth every day.  2   • apixaban (ELIQUIS) 5mg Tab Take 5 mg by mouth 2 times a day.       No current facility-administered medications for this visit.       Physical Exam:  Vitals:    05/16/22 0810   BP: 120/82   BP Location: Left arm   Patient Position: Sitting   BP Cuff Size: Adult   Pulse: 75   Resp: 16   SpO2: 97%   Weight: 93.4 kg (206 lb)   Height: 1.905 m (6' 3\")     General appearance: NAD, conversant  HEENT: PERRL, neck is supple with FROM  Lungs: Clear to auscultation, normal respiratory effort  CV: RRR, 3/6 systolic murmur, no JVD  Abdomen: Soft, non-tender with normal bowel sounds  Extremities: No peripheral edema, no clubbing or cyanosis  Skin: No rash, lesions, or ulcers  Psych: Alert and oriented to person, place and time    Data:  Labs reviewed    Prior echo/stress reviewed:  LVEF 25%    EKG interpreted by me:  BiV paced    Device interrogation showing no arrhythmias, essentially 100% A pacing, BiV paced    Impression/Plan:  1. Paroxysmal atrial fibrillation (HCC)  EKG   2. Chronic HFrEF (heart failure with reduced ejection fraction) (HCC)     3. Chronic anticoagulation     4. AV block, complete (HCC)     5. Biventricular cardiac pacemaker in situ       -He is complaining of rare phrenic stimulation   -I discussed reason for the twitching and I discussed reprogramming the LV vector if significantly bothersome but may worsen threshold and CRT function, he said it was so minor not " to bother for now  -Otherwise we are going to continue amiodarone at 200  -Recheck LFTs/TFTs next visit  -If further out looking good, may switch to sotalol/uma combination    Jesus Garza MD

## 2022-06-17 LAB — EKG IMPRESSION: NORMAL

## 2022-06-26 ENCOUNTER — NON-PROVIDER VISIT (OUTPATIENT)
Dept: CARDIOLOGY | Facility: MEDICAL CENTER | Age: 72
End: 2022-06-26
Payer: MEDICARE

## 2022-06-26 PROCEDURE — 93295 DEV INTERROG REMOTE 1/2/MLT: CPT | Performed by: INTERNAL MEDICINE

## 2022-07-27 NOTE — CARDIAC REMOTE MONITOR - SCAN
Device transmission reviewed. Device demonstrated appropriate function.       Electronically Signed by: Jesus Garza M.D.    8/3/2022  8:06 AM

## 2022-08-19 ENCOUNTER — HOSPITAL ENCOUNTER (OUTPATIENT)
Dept: CARDIOLOGY | Facility: MEDICAL CENTER | Age: 72
End: 2022-08-19
Attending: INTERNAL MEDICINE
Payer: MEDICARE

## 2022-08-19 DIAGNOSIS — I50.22 CHRONIC HFREF (HEART FAILURE WITH REDUCED EJECTION FRACTION) (HCC): ICD-10-CM

## 2022-08-19 LAB
LV EJECT FRACT  99904: 25
LV EJECT FRACT MOD 2C 99903: 20.61
LV EJECT FRACT MOD 4C 99902: 29.8
LV EJECT FRACT MOD BP 99901: 26.36

## 2022-08-19 PROCEDURE — 93306 TTE W/DOPPLER COMPLETE: CPT | Mod: 26 | Performed by: INTERNAL MEDICINE

## 2022-08-19 PROCEDURE — 93306 TTE W/DOPPLER COMPLETE: CPT

## 2022-08-22 ENCOUNTER — OFFICE VISIT (OUTPATIENT)
Dept: CARDIOLOGY | Facility: MEDICAL CENTER | Age: 72
End: 2022-08-22
Payer: MEDICARE

## 2022-08-22 ENCOUNTER — TELEPHONE (OUTPATIENT)
Dept: CARDIOLOGY | Facility: MEDICAL CENTER | Age: 72
End: 2022-08-22

## 2022-08-22 VITALS
OXYGEN SATURATION: 97 % | WEIGHT: 204.4 LBS | RESPIRATION RATE: 14 BRPM | DIASTOLIC BLOOD PRESSURE: 52 MMHG | BODY MASS INDEX: 25.41 KG/M2 | HEIGHT: 75 IN | SYSTOLIC BLOOD PRESSURE: 94 MMHG | HEART RATE: 66 BPM

## 2022-08-22 DIAGNOSIS — I50.22 CHRONIC HFREF (HEART FAILURE WITH REDUCED EJECTION FRACTION) (HCC): ICD-10-CM

## 2022-08-22 DIAGNOSIS — Z79.01 CHRONIC ANTICOAGULATION: ICD-10-CM

## 2022-08-22 DIAGNOSIS — I48.0 PAROXYSMAL ATRIAL FIBRILLATION (HCC): ICD-10-CM

## 2022-08-22 DIAGNOSIS — Z95.3 S/P MITRAL VALVE REPLACEMENT WITH BIOPROSTHETIC VALVE: ICD-10-CM

## 2022-08-22 DIAGNOSIS — Z79.899 ENCOUNTER FOR LONG-TERM CURRENT USE OF HIGH RISK MEDICATION: ICD-10-CM

## 2022-08-22 DIAGNOSIS — Z79.899 ON AMIODARONE THERAPY: ICD-10-CM

## 2022-08-22 DIAGNOSIS — Z98.890 HISTORY OF TRICUSPID VALVE REPAIR: ICD-10-CM

## 2022-08-22 DIAGNOSIS — I42.0 DILATED CARDIOMYOPATHY (HCC): ICD-10-CM

## 2022-08-22 PROCEDURE — 99215 OFFICE O/P EST HI 40 MIN: CPT | Performed by: INTERNAL MEDICINE

## 2022-08-22 RX ORDER — LEVOTHYROXINE SODIUM 0.12 MG/1
TABLET ORAL
COMMUNITY
Start: 2022-08-18 | End: 2022-08-22

## 2022-08-22 RX ORDER — SILDENAFIL 100 MG/1
100 TABLET, FILM COATED ORAL PRN
COMMUNITY
Start: 2022-06-27 | End: 2022-08-25

## 2022-08-22 ASSESSMENT — ENCOUNTER SYMPTOMS
FEVER: 0
BLURRED VISION: 0
PALPITATIONS: 0
SHORTNESS OF BREATH: 0
PND: 0
INSOMNIA: 0
ORTHOPNEA: 0
DIZZINESS: 0
MYALGIAS: 0
ABDOMINAL PAIN: 0
CHILLS: 0
LOSS OF CONSCIOUSNESS: 0

## 2022-08-22 ASSESSMENT — FIBROSIS 4 INDEX: FIB4 SCORE: 1.9

## 2022-08-22 NOTE — PROGRESS NOTES
Chief Complaint   Patient presents with    Atrial Fibrillation     F/V Dx: Paroxysmal atrial fibrillation (HCC)      Cardiac Arrest    Hypertension     F/V Dx: Essential hypertension, benign         Subjective     Pavan Cueva is a 72 y.o. male who presents today transitioning care from [facility] cardiology back to [facility].    The patient has significant past medical history of S/P mitral valve repair, tricuspid valve repair, MAZE, MYRNA ligation, PPM 7/13/2018 ([facility], [place]), PAF, redo S/P mitral valve replacement (33 mm Angeli Underwodo Theon pericardial valve) 9/8/2021 ([facility], [facility]), cardiac arrest 12/20/2021 ([facility]), CRT-D 12/22/2021 ([facility]), nonischemic cardiomyopathy, HFrEF 25%, amiodarone therapy, hypothyroidism (amiodarone induced), anticoagulation, hypertension, ARIANA.    Since 5/5/2022 appointment the patient saw Dr. Kayla GUZMAN on 5/16/2022 who wished to continue amiodarone, enrolled in device clinic, monitoring.  Has had no cardiac symptoms including chest pain, palpitations, shortness of breath.  Has noted on 2-3 occasions some brief lightheadedness feeling faint improved with fluid intake.  At last appointment decreased amiodarone to 200 mg daily.    Past Medical History:   Diagnosis Date    AF (atrial fibrillation) ([facility])          Anemia     AV block, complete ([facility])     Cardiomyopathy, dilated ([facility]) 03/2022    Echocardiogram with severely dilated LV, LVEF 20-25%. Global hypokinesis. Normal RV. Severely dilated LA and RV. MV prothesis with elevated transvalvular gradient. Trace AR, mild TR. RVSP 47mmHg.    Chronic anticoagulation     Heart valve disease     Hemorrhagic disorder ([facility])     History of epistaxis    Hypertension     ARIANA (obstructive sleep apnea)     Renal mass     Restrictive lung disease     Sleep apnea     no cpap, 3L O2    Thyroid disease      Past Surgical History:   Procedure Laterality Date    MITRAL VALVE  REPLACEMENT  2021    Redo MVR (#33 Angeli-Underwood Theon pericardial valve) by Dr. Chou, [place], [place]    PACEMAKER INSERTION Left 2018    Medtronic [place] XT DR JIN [id number] implanted by Dr. Gillette [place], [place]    MAZE PROCEDURE  2018    TRICUSPID VALVE REPAIR  2018    MITRAL VALVE REPAIR  2018     MV repair, TV repair, MYRNA ligation, MAZE procedure and biatrial resizing by Dr. Chou at [place], [place].    MITRAL VALVE REPAIR      OTHER      Nasal surgery for nosebleeds     Family History   Problem Relation Age of Onset    Cancer Mother     Stroke Father         CVA at 85, afib    Heart Disease Father      Social History     Socioeconomic History    Marital status:      Spouse name: Not on file    Number of children: Not on file    Years of education: Not on file    Highest education level: Not on file   Occupational History    Not on file   Tobacco Use    Smoking status: Former     Packs/day: 0.00     Types: Cigarettes     Quit date: 1970     Years since quittin.6    Smokeless tobacco: Never   Vaping Use    Vaping Use: Never used   Substance and Sexual Activity    Alcohol use: Yes     Comment: very little    Drug use: Not Currently    Sexual activity: Not on file   Other Topics Concern    Not on file   Social History Narrative    Not on file     Social Determinants of Health     Financial Resource Strain: Not on file   Food Insecurity: Not on file   Transportation Needs: Not on file   Physical Activity: Not on file   Stress: Not on file   Social Connections: Not on file   Intimate Partner Violence: Not on file   Housing Stability: Not on file     No Known Allergies  Outpatient Encounter Medications as of 2022   Medication Sig Dispense Refill    sildenafil citrate (VIAGRA) 100 MG tablet Take 100 mg by mouth as needed.      levothyroxine (SYNTHROID) 112 MCG Tab Take 112 mcg by mouth every day.      digoxin (LANOXIN) 125 MCG Tab Take 1 Tablet by mouth  "every day. 90 Tablet 3    amiodarone (CORDARONE) 200 MG Tab Take 1 Tablet by mouth 2 times a day. 180 Tablet 1    enalapril (VASOTEC) 2.5 MG Tab TAKE 1 TABLET BY MOUTH EVERY EVENING (Patient taking differently: Take 2.5 mg by mouth as needed.) 90 Tablet 3    metoprolol SR (TOPROL XL) 25 MG TABLET SR 24 HR Take 0.5 Tablets by mouth 2 times a day. 90 Tablet 3    Probiotic Product (PROBIOTIC-10) Chew Tab Chew 1 Tablet every day.      ferrous sulfate 325 (65 Fe) MG tablet Take 1 Tablet by mouth every 48 hours.      magnesium oxide (MAG-OX) 400 MG Tab tablet Take 1 tablet by mouth every day.      potassium chloride (MICRO-K) 10 MEQ capsule Take 10 mEq by mouth.      traZODone (DESYREL) 50 MG Tab Take 50 mg by mouth as needed.      Multiple Vitamin (MULTI-VITAMINS) Tab Take 1 tablet by mouth every day.      furosemide (LASIX) 40 MG Tab Take 40 mg by mouth every day.  2    apixaban (ELIQUIS) 5mg Tab Take 5 mg by mouth 2 times a day.      [DISCONTINUED] levothyroxine (SYNTHROID) 125 MCG Tab  (Patient not taking: Reported on 8/22/2022)       No facility-administered encounter medications on file as of 8/22/2022.     Review of Systems   Constitutional:  Negative for chills and fever.   HENT:  Negative for congestion.    Eyes:  Negative for blurred vision.   Respiratory:  Negative for shortness of breath.    Cardiovascular:  Negative for chest pain, palpitations, orthopnea, leg swelling and PND.   Gastrointestinal:  Negative for abdominal pain.   Genitourinary:  Negative for dysuria.   Musculoskeletal:  Negative for joint pain and myalgias.   Skin:  Negative for rash.   Neurological:  Negative for dizziness and loss of consciousness.   Psychiatric/Behavioral:  The patient does not have insomnia.          BP (!) 94/52 (BP Location: Left arm, Patient Position: Sitting, BP Cuff Size: Adult)   Pulse 66   Resp 14   Ht 1.905 m (6' 3\")   Wt 92.7 kg (204 lb 6.4 oz)   SpO2 97%   BMI 25.55 kg/m²     Physical Exam  Vitals reviewed. "   Constitutional:       General: He is not in acute distress.     Appearance: He is well-developed.   Neck:      Vascular: No JVD.   Cardiovascular:      Rate and Rhythm: Normal rate and regular rhythm.      Pulses:           Carotid pulses are 2+ on the right side and 2+ on the left side.     Heart sounds: Murmur heard.     No friction rub. No gallop.      Comments: Midline scar  Generator left subclavian area  Pulmonary:      Effort: Pulmonary effort is normal. No accessory muscle usage or respiratory distress.      Breath sounds: Normal breath sounds. No wheezing or rales.   Musculoskeletal:      Cervical back: Normal range of motion and neck supple.      Right lower leg: No edema.      Left lower leg: No edema.   Skin:     General: Skin is warm and dry.      Findings: No rash.      Nails: There is no clubbing.   Neurological:      Mental Status: He is alert and oriented to person, place, and time.   Psychiatric:         Behavior: Behavior normal.            ECHOCARDIOGRAM 3/18/2022  Severely reduced left ventricular systolic function.   The left ventricular ejection fraction is visually estimated to be 20-25%.   Grade III diastolic dysfunction (restrictive pattern).  Known mitral valve bioprosthesis with elevated transvalvular gradient.  Estimated right ventricular systolic pressure is 47 mmHg; mild pulmonary hypertension.   No pericardial effusion.    EKG 5/5/2021 AV paced rate 61 personally interpreted.    Assessment & Plan     1. Chronic HFrEF (heart failure with reduced ejection fraction) (HCC)  DX-CHEST-2 VIEWS    Referral to Pharmacotherapy Service    Comp Metabolic Panel      2. Dilated cardiomyopathy (HCC)        3. Paroxysmal atrial fibrillation (HCC)        4. S/P mitral valve replacement with bioprosthetic valve        5. On amiodarone therapy        6. History of tricuspid valve repair        7. Chronic anticoagulation        8. Encounter for long-term current use of high risk medication             Medical Decision Making: Today's Assessment/Status/Plan:   Assessment  1.  HFrEF 25%.  2.  Nonischemic cardiomyopathy  3.  S/P MVR (33 mm Angeli Underwood Theon pericardial valve) 9/8/2021 ([place], [place]).  4.  S/P mitral valve repair, tricuspid valve repair, MAZE, MYRNA ligation, PPM 7/13/2018 ([place], [place])  5.  CRT-D 12/22/2021  6.  PAF.  7.  Amiodarone therapy, started 12/2021.  8.  Anticoagulation, apixaban.  9.  Hypothyroidism amiodarone induced, followed by Dr. Mackay endocrinologist [place].  10.  Hypertension.  11.  ARIANA    Recommendation Discussion  1.  HFrEF: Clinically stable, euvolemic, reviewed follow-up echocardiogram results with the patient showing no change EF still 25% and explaining the significance, unfortunately having episodic symptomatic hypotension as result only using enalapril prn depending on daily BP, unable to tolerate optimal HF GDMT regiment, discussed medical therapy at length, will try to transition to lower furosemide dose in an attempt to allow for daily enalapril usage, additionally will try to start dapagliflozin 10 mg daily and further adjust furosemide, recent digoxin level 1.3, creatinine 1.22 (improved) thus decrease digoxin to  qod, continue metoprolol, will defer consideration of spironolactone until other adjustments are made, follow-up CMP.  2.  PAF: Maintaining sinus rhythm, continue amiodarone, apixaban.  3.  MVR: Recent echocardiogram images reviewed, functioning normally, continue to monitor.  4.  Amiodarone: Amiodarone is a high risk medication, has developed related hypothyroidism, requiring surveillance for liver and lung toxicity, will need, liver function every 6 months, annual CXR.  Recent LFTs normal, CXR ordered, on thyroid supplementation, continue monitoring for symptoms.  5.  Hypertension: Episodic symptomatic hypotension, see above  6.  RTC 3 months.      [address] complex medical problems and high risk medications  requiring assessment, management and monitoring of high risk medication, review of diagnostic data and medical records, ordering tests, initiating new therapy, coordinating care regarding congestive heart failure and atrial fibrillation and extensive patient education.

## 2022-08-22 NOTE — TELEPHONE ENCOUNTER
MATTHEW          Caller: Pavan Cueva    Where labs will be completed: [facility]ogue[facility] at [facility]  Fax/Phone number for lab:[phone]  Upcoming Appointment Date: 11/21/22  Callback Number: [phone] (home)         Thank you    -Pantera BARRERA

## 2022-08-22 NOTE — TELEPHONE ENCOUNTER
Tried to call pt, no answer, sent [employer] asking pt to listen to Dr. Romero's voicemail and instructing to cut digoxin to EOD.

## 2022-08-22 NOTE — TELEPHONE ENCOUNTER
----- Message from Lucas Romero M.D. sent at 8/22/2022 12:45 PM PDT -----  Regarding: Digoxin dosage  I contacted the patient by phone, got voicemail, left a message for him to reduce his digoxin dosage to every other day, could you follow-up with that to make sure it is done  Thank you

## 2022-08-22 NOTE — TELEPHONE ENCOUNTER
Faxed lab order to [facility] at [phone] as requested by pt. Servando msg to pt informing this has been done.

## 2022-08-23 ENCOUNTER — PATIENT MESSAGE (OUTPATIENT)
Dept: CARDIOLOGY | Facility: MEDICAL CENTER | Age: 72
End: 2022-08-23
Payer: MEDICARE

## 2022-08-23 NOTE — PATIENT COMMUNICATION
S/w pt and he provided a fax number that ended up being disconnected.     Faxed orders to fax number we have on record for [employer] which is [phone]. Fax confirmation received. Attempted to call pt back x2 and unable to reach. Left a voice message that the number he provided was not in service and with the number that the order was faxed to successfully.

## 2022-08-23 NOTE — TELEPHONE ENCOUNTER
S/w pt. He was not asking for another x-ray order. The order for his chest xray was faxed to [facility] (as documented in mychart encounter) and he was able to complete this today. He had no further requests and his concerns have been addressed.

## 2022-08-23 NOTE — TELEPHONE ENCOUNTER
MATTHEW        Caller: Pavan Cueva    Topic/issue: Patient called and was asking if 2 chest X rays could also be sent in so that he could complete them at this office as well. He asked for a call back when it was sent  Fax# [phone]  Callback Number: [phone] (home)     Thank you    -Pantera BARRERA

## 2022-08-24 ENCOUNTER — TELEPHONE (OUTPATIENT)
Dept: VASCULAR LAB | Facility: MEDICAL CENTER | Age: 72
End: 2022-08-24
Payer: MEDICARE

## 2022-08-24 NOTE — PROGRESS NOTES
CHF Pharmacotherapy visit - Visit    Date of Service: 22    Informed written consent was given on: 22    Pavan Cueva is here for CHF     HPI  Pertinent Interval History since last visit:   This is pt's baseline visit    Most recent EF:  25% (22)      Current Outpatient Medications:     dapagliflozin propanediol, 10 mg, Oral, DAILY    Levothyroxine Sodium, 112 mcg, Oral, DAILY    digoxin, 125 mcg, Oral, DAILY (Patient taking differently: 125 mcg, Oral, EVERY 48 HOURS, Other)    amiodarone, 200 mg, Oral, BID (Patient taking differently: 200 mg, Oral, DAILY, Other)    enalapril, 2.5 mg, Oral, Q EVENING (Patient taking differently: 2.5 mg, Oral, PRN, Other)    metoprolol SR, 12.5 mg, Oral, BID    Probiotic-10, 1 Tablet, Oral, DAILY    ferrous sulfate, 1 Tablet, Oral, Q48HRS    magnesium oxide, 1 Tablet, Oral, DAILY    potassium chloride, 10 mEq, Oral, DAILY    traZODone, 50 mg, Oral, PRN    Multi-Vitamins, 1 Tablet, Oral, DAILY    furosemide, 40 mg, Oral, DAILY    apixaban, 5 mg, Oral, BID    Current Adherence to CHF Therapies:  Complete    Current CHF Medications - including dose:   Entresto or ACE/ARB: enalapril 2.5 mg qPM PRN if SBP>112  Beta blocker: metoprolol SR 25 mg (0.5 tab) mg BID  Diuretic: furosemide 40 mg daily  Aldosterone antagonist: none  SGLT2i: none  Digoxin 125 mcg every other day    Home BP and HR:  SBP 100s  DBP 57    Change in weight: Stable    Exercise habits:  Does yardwork/chores around the house      Diet: common adult    SOCIAL HISTORY  Social History     Tobacco Use   Smoking Status Former    Packs/day: 0.00    Types: Cigarettes    Quit date: 1970    Years since quittin.6   Smokeless Tobacco Never        DATA REVIEW  Vitals:    22 0826   BP: 103/53   Pulse: 62       No results found for: HBA1C       Lab Results   Component Value Date/Time    CHOLSTRLTOT 165 2022 04:47 AM    CHOLSTRLTOT 149 2021 08:39 AM    LDL 80 2021 08:39 AM    HDL 59  03/01/2022 04:47 AM    HDL 63 06/01/2021 08:39 AM    TRIGLYCERIDE 59 03/01/2022 04:47 AM    TRIGLYCERIDE 28 06/01/2021 08:39 AM       Lab Results   Component Value Date/Time    SODIUM 138 08/23/2022 11:56 AM    SODIUM 140 03/01/2022 04:47 AM    SODIUM 137 06/01/2021 08:39 AM    POTASSIUM 4.5 08/23/2022 11:56 AM    POTASSIUM 4.6 03/01/2022 04:47 AM    POTASSIUM 4.7 06/01/2021 08:39 AM    CHLORIDE 104 08/23/2022 11:56 AM    CHLORIDE 100 03/01/2022 04:47 AM    CHLORIDE 102 06/01/2021 08:39 AM    CO2 30 08/23/2022 11:56 AM    CO2 28 03/01/2022 04:47 AM    CO2 28 06/01/2021 08:39 AM    GLUCOSE 80 08/23/2022 11:56 AM    GLUCOSE 95 03/01/2022 04:47 AM    GLUCOSE 91 06/01/2021 08:39 AM    BUN 23 (H) 08/23/2022 11:56 AM    BUN 20 03/01/2022 04:47 AM    BUN 22 06/01/2021 08:39 AM    CREATININE 1.22 08/23/2022 11:56 AM    CREATININE 1.13 03/01/2022 04:47 AM    CREATININE 0.85 06/01/2021 08:39 AM    BUNCREATRAT 18 03/01/2022 04:47 AM    GLOMRATE 59 (L) 08/23/2022 11:56 AM     Lab Results   Component Value Date/Time    ALKPHOSPHAT 44 08/23/2022 11:56 AM    ALKPHOSPHAT 59 06/01/2021 08:39 AM    ASTSGOT 17 08/23/2022 11:56 AM    ASTSGOT 22 06/01/2021 08:39 AM    ALTSGPT 14 (L) 08/23/2022 11:56 AM    ALTSGPT 20 06/01/2021 08:39 AM    TBILIRUBIN 0.7 08/23/2022 11:56 AM    TBILIRUBIN 1.0 06/01/2021 08:39 AM    INR 1.17 (H) 06/03/2021 11:57 AM    ALBUMIN 4.6 08/23/2022 11:56 AM    ALBUMIN 4.3 06/01/2021 08:39 AM      No components found for: MICROALBUMINCREATRATIOURINE    Renal function:  Calculated creatinine clearance: 73 ml/min     Other Pertinent Blood Work:     Other:  Immunization History   Administered Date(s) Administered    Influenza Vac Subunit Quad Inj (Pf) 11/26/2018    Influenza Vaccine Adult HD 11/14/2019    Influenza Vaccine Quad Inj (Pf) 10/08/2020, 10/08/2020    Influenza, Unspecified - HISTORICAL DATA 11/14/2019    PFIZER PURPLE CAP SARS-COV-2 VACCINATION (12+) 07/30/2021, 08/21/2021    Pneumococcal Conjugate Vaccine  (Prevnar/PCV-13) 09/11/2019    Tdap Vaccine 07/30/2010     Up to date on pneumococcal vaccine? yes    Recent Imaging Studies:    Recent imaging studies, including ECHO, were available in EMR and reviewed with patient at today's visit      ASSESSMENT AND PLAN  This is pt's baseline visit. He has an extensive cardiac hx. He had to be resuscitated 2x in the past  Educated pt on basic pathophysiology and symptom management, as well as the importance of GDMT.  Pt's EF remained unchanged from March to Aug 2022 - his symptomatic hypotension is a barrier to titrating his HF medications  He was referred to our clinic to start SGLT2i in hopes of reducing furosemide and improving EF - pt did express concern w/ reducing furosemide d/t hx of pleural effusion; explained to pt that SGLT2i will help w/ fluid retention + provides cardiac benefits    Resulting CHF medications today (changes are bolded)  Entresto or ACE/ARB: Enalapril 2.5 mg qPM PRN SBP>120  Beta blocker: metoprolol SR 25 mg (0.5 tab) mg BID  Diuretic: furosemide 40 mg daily  Aldosterone antagonist: none  SGLT2i: Start Farxiga 10 mg daily (pt may need to reduce furosemide to 20 mg daily if SBP <100)  Digoxin 125 mcg every other day    Lifestyle Recommendations From Today's Visit:   Continue to limit fluid intake to 2L/day and Na+ intake to 2gm/day    Significant changes to laboratory values since last visit that require repeat labs:  N/a    Blood Work Ordered At Today's visit:   None    Studies Ordered at Todays Visit:  None     Follow-Up:   2 months    Rona Quintero, FreddieD

## 2022-08-24 NOTE — TELEPHONE ENCOUNTER
[facility]    Received CHF referral from Dr. Tolentino on 8/22    Note    Initiation of dapagliflozin (Farxiga) 10 mg daily          Scheduled NP for 8/25    Insurance: Medicare  Locations to be seen: [facility] at [phone], fax [phone]    Rona Quintero, PharmD

## 2022-08-25 ENCOUNTER — NON-PROVIDER VISIT (OUTPATIENT)
Dept: CARDIOLOGY | Facility: MEDICAL CENTER | Age: 72
End: 2022-08-25
Payer: MEDICARE

## 2022-08-25 VITALS — DIASTOLIC BLOOD PRESSURE: 53 MMHG | HEART RATE: 62 BPM | SYSTOLIC BLOOD PRESSURE: 103 MMHG

## 2022-08-25 DIAGNOSIS — I50.22 CHRONIC HFREF (HEART FAILURE WITH REDUCED EJECTION FRACTION) (HCC): Primary | ICD-10-CM

## 2022-08-25 PROCEDURE — 99211 OFF/OP EST MAY X REQ PHY/QHP: CPT | Performed by: INTERNAL MEDICINE

## 2022-08-25 RX ORDER — DAPAGLIFLOZIN 10 MG/1
10 TABLET, FILM COATED ORAL DAILY
Qty: 30 TABLET | Refills: 11 | Status: SHIPPED | OUTPATIENT
Start: 2022-08-25 | End: 2023-03-17 | Stop reason: DRUGHIGH

## 2022-09-09 ENCOUNTER — PATIENT MESSAGE (OUTPATIENT)
Dept: CARDIOLOGY | Facility: MEDICAL CENTER | Age: 72
End: 2022-09-09
Payer: MEDICARE

## 2022-09-09 RX ORDER — FUROSEMIDE 20 MG/1
20 TABLET ORAL DAILY
Qty: 90 TABLET | Refills: 3 | Status: SHIPPED | OUTPATIENT
Start: 2022-09-09 | End: 2022-10-14

## 2022-09-16 DIAGNOSIS — I48.0 PAROXYSMAL ATRIAL FIBRILLATION (HCC): ICD-10-CM

## 2022-09-16 RX ORDER — AMIODARONE HYDROCHLORIDE 200 MG/1
TABLET ORAL
Qty: 180 TABLET | Refills: 1 | Status: SHIPPED | OUTPATIENT
Start: 2022-09-16 | End: 2022-10-27

## 2022-09-26 ENCOUNTER — TELEPHONE (OUTPATIENT)
Dept: MEDICAL GROUP | Facility: MEDICAL CENTER | Age: 72
End: 2022-09-26
Payer: MEDICARE

## 2022-09-27 ENCOUNTER — NON-PROVIDER VISIT (OUTPATIENT)
Dept: CARDIOLOGY | Facility: MEDICAL CENTER | Age: 72
End: 2022-09-27
Payer: MEDICARE

## 2022-09-27 PROCEDURE — 93295 DEV INTERROG REMOTE 1/2/MLT: CPT | Performed by: INTERNAL MEDICINE

## 2022-09-27 NOTE — CARDIAC REMOTE MONITOR - SCAN
Device transmission reviewed. Device demonstrated appropriate function.       Electronically Signed by: Jesus Garza M.D.    9/30/2022  8:28 AM

## 2022-10-14 NOTE — PROGRESS NOTES
D/C'd furosemide, pt and provider (SW) decision. Excessive urination with furosemide and farxiga taken together.

## 2022-10-27 ENCOUNTER — OFFICE VISIT (OUTPATIENT)
Dept: CARDIOLOGY | Facility: MEDICAL CENTER | Age: 72
End: 2022-10-27
Payer: MEDICARE

## 2022-10-27 ENCOUNTER — NON-PROVIDER VISIT (OUTPATIENT)
Dept: CARDIOLOGY | Facility: MEDICAL CENTER | Age: 72
End: 2022-10-27
Payer: MEDICARE

## 2022-10-27 VITALS
RESPIRATION RATE: 14 BRPM | WEIGHT: 208 LBS | SYSTOLIC BLOOD PRESSURE: 120 MMHG | HEIGHT: 75 IN | HEART RATE: 64 BPM | BODY MASS INDEX: 25.86 KG/M2 | OXYGEN SATURATION: 97 % | DIASTOLIC BLOOD PRESSURE: 80 MMHG

## 2022-10-27 VITALS
HEART RATE: 60 BPM | BODY MASS INDEX: 26.1 KG/M2 | SYSTOLIC BLOOD PRESSURE: 111 MMHG | WEIGHT: 208.8 LBS | DIASTOLIC BLOOD PRESSURE: 56 MMHG

## 2022-10-27 DIAGNOSIS — Z95.810 BIVENTRICULAR ICD (IMPLANTABLE CARDIOVERTER-DEFIBRILLATOR) IN PLACE: ICD-10-CM

## 2022-10-27 DIAGNOSIS — Z98.890 HISTORY OF MITRAL VALVE REPAIR: ICD-10-CM

## 2022-10-27 DIAGNOSIS — I48.0 PAROXYSMAL ATRIAL FIBRILLATION (HCC): ICD-10-CM

## 2022-10-27 DIAGNOSIS — I44.2 AV BLOCK, COMPLETE (HCC): ICD-10-CM

## 2022-10-27 PROCEDURE — 93284 PRGRMG EVAL IMPLANTABLE DFB: CPT | Performed by: INTERNAL MEDICINE

## 2022-10-27 PROCEDURE — 93000 ELECTROCARDIOGRAM COMPLETE: CPT | Mod: 59 | Performed by: INTERNAL MEDICINE

## 2022-10-27 PROCEDURE — 99999 PR NO CHARGE: CPT | Performed by: INTERNAL MEDICINE

## 2022-10-27 PROCEDURE — 99214 OFFICE O/P EST MOD 30 MIN: CPT | Mod: 25 | Performed by: INTERNAL MEDICINE

## 2022-10-27 RX ORDER — SOTALOL HYDROCHLORIDE 80 MG/1
80 TABLET ORAL 2 TIMES DAILY
Qty: 60 TABLET | Refills: 3 | Status: SHIPPED | OUTPATIENT
Start: 2022-10-27 | End: 2022-11-02

## 2022-10-27 ASSESSMENT — FIBROSIS 4 INDEX
FIB4 SCORE: 1.83
FIB4 SCORE: 1.83

## 2022-10-27 NOTE — TELEPHONE ENCOUNTER
Is the patient due for a refill? No    Was the patient seen the past year? Yes    Date of last office visit: 10/27/2022    Does the patient have an upcoming appointment?  Yes   If yes, When? 11/21/2022    Provider to refill:SS    Does the patients insurance require a 100 day supply?  No

## 2022-10-27 NOTE — PROGRESS NOTES
CHF Pharmacotherapy visit - Visit    Date of Service: 10/26/22    Informed written consent was given on: 22    Pavan Cueva is here for CHF     HPI  Pertinent Interval History since last visit:   Patient feels well most days, some days feels like he has coordination problems.    Most recent EF:  25% (22)      Current Outpatient Medications:     dapagliflozin propanediol, 10 mg, Oral, DAILY, Taking    Levothyroxine Sodium, 125 mcg, Oral, DAILY, Taking    digoxin, 125 mcg, Oral, DAILY (Patient taking differently: 125 mcg, Oral, EVERY 48 HOURS), Taking    enalapril, 2.5 mg, Oral, Q EVENING (Patient taking differently: 2.5 mg, Oral, PRN), PRN    metoprolol SR, 12.5 mg, Oral, BID, Taking    Probiotic-10, 1 Tablet, Oral, DAILY, Taking    ferrous sulfate, 1 Tablet, Oral, Q48HRS, Taking    magnesium oxide, 1 Tablet, Oral, DAILY, Taking    potassium chloride, 10 mEq, Oral, DAILY, Taking    traZODone, 50 mg, Oral, PRN, PRN    Multi-Vitamins, 1 Tablet, Oral, DAILY, Taking    apixaban, 5 mg, Oral, BID, Taking    amiodarone, TAKE 1 TABLET BY MOUTH TWICE DAILY (Patient taking differently: DAILY)    Current Adherence to CHF Therapies:  Complete     Current CHF Medications - including dose:   Entresto or ACE/ARB: enalapril 2.5 mg qPM PRN if SBP>120  Beta blocker: metoprolol SR 25 mg (0.5 tab) mg BID  Diuretic: furosemide 40 mg daily - pt stopped  Aldosterone antagonist: none  SGLT2i: Farxiga 10 mg daily  Digoxin 125 mcg every other day    Home BP and HR:   -108  DBP 60's    Change in weight: Stable - up 4 lbs from last visit    Exercise habits:  Does yardwork/chores around the house    Diet: common adult     SOCIAL HISTORY  Social History     Tobacco Use   Smoking Status Former    Packs/day: 0.00    Types: Cigarettes    Quit date: 1970    Years since quittin.8   Smokeless Tobacco Never        DATA REVIEW  Vitals:    10/27/22 1007   BP: 111/56   Pulse: 60         No results found for: HBA1C       Lab  Results   Component Value Date/Time    CHOLSTRLTOT 165 03/01/2022 04:47 AM    CHOLSTRLTOT 149 06/01/2021 08:39 AM    LDL 80 06/01/2021 08:39 AM    HDL 59 03/01/2022 04:47 AM    HDL 63 06/01/2021 08:39 AM    TRIGLYCERIDE 59 03/01/2022 04:47 AM    TRIGLYCERIDE 28 06/01/2021 08:39 AM       Lab Results   Component Value Date/Time    SODIUM 138 08/23/2022 11:56 AM    SODIUM 140 03/01/2022 04:47 AM    SODIUM 137 06/01/2021 08:39 AM    POTASSIUM 4.5 08/23/2022 11:56 AM    POTASSIUM 4.6 03/01/2022 04:47 AM    POTASSIUM 4.7 06/01/2021 08:39 AM    CHLORIDE 104 08/23/2022 11:56 AM    CHLORIDE 100 03/01/2022 04:47 AM    CHLORIDE 102 06/01/2021 08:39 AM    CO2 30 08/23/2022 11:56 AM    CO2 28 03/01/2022 04:47 AM    CO2 28 06/01/2021 08:39 AM    GLUCOSE 80 08/23/2022 11:56 AM    GLUCOSE 95 03/01/2022 04:47 AM    GLUCOSE 91 06/01/2021 08:39 AM    BUN 23 (H) 08/23/2022 11:56 AM    BUN 20 03/01/2022 04:47 AM    BUN 22 06/01/2021 08:39 AM    CREATININE 1.22 08/23/2022 11:56 AM    CREATININE 1.13 03/01/2022 04:47 AM    CREATININE 0.85 06/01/2021 08:39 AM    BUNCREATRAT 18 03/01/2022 04:47 AM    GLOMRATE 59 (L) 08/23/2022 11:56 AM     Lab Results   Component Value Date/Time    ALKPHOSPHAT 44 08/23/2022 11:56 AM    ALKPHOSPHAT 59 06/01/2021 08:39 AM    ASTSGOT 17 08/23/2022 11:56 AM    ASTSGOT 22 06/01/2021 08:39 AM    ALTSGPT 14 (L) 08/23/2022 11:56 AM    ALTSGPT 20 06/01/2021 08:39 AM    TBILIRUBIN 0.7 08/23/2022 11:56 AM    TBILIRUBIN 1.0 06/01/2021 08:39 AM    INR 1.17 (H) 06/03/2021 11:57 AM    ALBUMIN 4.6 08/23/2022 11:56 AM    ALBUMIN 4.3 06/01/2021 08:39 AM      No components found for: MICROALBUMINCREATRATIOURINE    Renal function:  Calculated creatinine clearance: 73 ml/min     Other Pertinent Blood Work:     Other:  Immunization History   Administered Date(s) Administered    Influenza Vac Subunit Quad Inj (Pf) 11/26/2018    Influenza Vaccine Adult HD 11/14/2019    Influenza Vaccine Quad Inj (Pf) 10/08/2020, 10/08/2020     Influenza, Unspecified - HISTORICAL DATA 11/14/2019    PFIZER PURPLE CAP SARS-COV-2 VACCINATION (12+) 07/30/2021, 08/21/2021    Pneumococcal Conjugate Vaccine (Prevnar/PCV-13) 09/11/2019    Tdap Vaccine 07/30/2010     Up to date on pneumococcal vaccine? yes    Recent Imaging Studies:    None      ASSESSMENT AND PLAN    He has an extensive cardiac hx. He had to be resuscitated 2x in the past  Reinforced pt on basic pathophysiology and symptom management, as well as the importance of GDMT. Reviewed MOA for medications for heart failure.   Pt's EF remained unchanged from March to Aug 2022 - his symptomatic hypotension is a barrier to titrating his HF medications. Patient wondering when next echocardiogram will be done.   Patient tolerating Farxiga without issues, patient is concerned that BP is lower. Discussed benefit of Farxiga and it's role in CHF. Patient to take daily BP and record in log. May consider Farxiga 5 mg if patient has SBP <100 at next visit.     Resulting CHF medications today (changes are bolded)  Entresto or ACE/ARB: Enalapril 2.5 mg qPM PRN SBP>120  Beta blocker: metoprolol SR 25 mg (0.5 tab) mg BID  Diuretic: furosemide 20 mg PRN if weight gain of more than 3 lbs in a day  SGLT2i: Start Farxiga 10 mg daily   Digoxin 125 mcg every other day    Lifestyle Recommendations From Today's Visit:   Continue to limit fluid intake to 2L/day and Na+ intake to 2gm/day    Significant changes to laboratory values since last visit that require repeat labs:  N/a    Blood Work Ordered At Today's visit:   None - CMP pending per Dr Romero    Studies Ordered at Todays Visit:  None     Follow-Up:   2 months    Kimberly Alston, PharmD    CC: William Arellano PharmD    Addendum: per Dr Garza's note planning to transition patient off amiodarone and onto Sotalol.

## 2022-10-27 NOTE — PROGRESS NOTES
Arrhythmia Clinic Note (Established patient)    DOS: 10/27/2022    Chief complaint/Reason for consult: F/u CRT-D/VT/Atrial arrhythmia    Interval History:  Pt is a 73 yo M. He has a history of complete heart block s/p CRT-D, prior sustained VT in setting of dilated cardiomyopathy. I initially saw him for atypical flutter referral from [facility] but prior to ablation visualized dehisced mitral valve ring. Underwent repeat bioprosthetic MVR.     Prior MAZE as well. Here for device check. Sees Dr. Romero for HF management. BP limits GDMT LV function has largely remained 25% or so. Recently started on Farxiga. Remains on amiodarone.    ROS (+ highlighted in red):  General--Negative for fatigue, weight loss or weight gain  Cardiovascular--Negative for CP, orthopnea, PND    Past Medical History:   Diagnosis Date    AF (atrial fibrillation) ([facility])          Anemia     AV block, complete ([facility])     Cardiomyopathy, dilated ([facility]) 03/2022    Echocardiogram with severely dilated LV, LVEF 20-25%. Global hypokinesis. Normal RV. Severely dilated LA and RV. MV prothesis with elevated transvalvular gradient. Trace AR, mild TR. RVSP 47mmHg.    Chronic anticoagulation     Heart valve disease     Hemorrhagic disorder (HCC)     History of epistaxis    Hypertension     ARIANA (obstructive sleep apnea)     Renal mass     Restrictive lung disease     Sleep apnea     no cpap, 3L O2    Thyroid disease        Past Surgical History:   Procedure Laterality Date    MITRAL VALVE REPLACEMENT  09/08/2021    Redo MVR (#33 Angeli-Underwood Theon pericardial valve) by Dr. Chou, [place], [place]    PACEMAKER INSERTION Left 07/13/2018    Medtronic [place] XT DR JIN [id number] implanted by Dr. Gillette, [place], [place]    MAZE PROCEDURE  05/2018    TRICUSPID VALVE REPAIR  05/2018    MITRAL VALVE REPAIR  05/2018     MV repair, TV repair, MYRNA ligation, MAZE procedure and biatrial resizing by Dr. Chou at [place], [place].    MITRAL VALVE REPAIR       OTHER      Nasal surgery for nosebleeds       Social History     Socioeconomic History    Marital status:      Spouse name: Not on file    Number of children: Not on file    Years of education: Not on file    Highest education level: Not on file   Occupational History    Not on file   Tobacco Use    Smoking status: Former     Packs/day: 0.00     Types: Cigarettes     Quit date:      Years since quittin.8    Smokeless tobacco: Never   Vaping Use    Vaping Use: Never used   Substance and Sexual Activity    Alcohol use: Yes     Comment: very little    Drug use: Not Currently    Sexual activity: Not on file   Other Topics Concern    Not on file   Social History Narrative    Not on file     Social Determinants of Health     Financial Resource Strain: Not on file   Food Insecurity: Not on file   Transportation Needs: Not on file   Physical Activity: Not on file   Stress: Not on file   Social Connections: Not on file   Intimate Partner Violence: Not on file   Housing Stability: Not on file       Family History   Problem Relation Age of Onset    Cancer Mother     Stroke Father         CVA at 85, afib    Heart Disease Father        No Known Allergies    Current Outpatient Medications   Medication Sig Dispense Refill    enalapril (VASOTEC) 2.5 MG Tab TAKE 1 TABLET BY MOUTH EVERY EVENING (Patient taking differently: Take 2.5 mg by mouth as needed.) 90 Tablet 3    amiodarone (CORDARONE) 200 MG Tab TAKE 1 TABLET BY MOUTH TWICE DAILY (Patient taking differently: every day.) 180 Tablet 1    dapagliflozin propanediol (FARXIGA) 10 MG Tab Take 1 Tablet by mouth every day. 30 Tablet 11    Levothyroxine Sodium 125 MCG Cap Take 125 mcg by mouth every day.      digoxin (LANOXIN) 125 MCG Tab Take 1 Tablet by mouth every day. (Patient taking differently: Take 125 mcg by mouth every 48 hours.) 90 Tablet 3    metoprolol SR (TOPROL XL) 25 MG TABLET SR 24 HR Take 0.5 Tablets by mouth 2 times a day. 90 Tablet 3    Probiotic  "Product (PROBIOTIC-10) Chew Tab Chew 1 Tablet every day.      ferrous sulfate 325 (65 Fe) MG tablet Take 1 Tablet by mouth every 48 hours.      magnesium oxide (MAG-OX) 400 MG Tab tablet Take 1 tablet by mouth every day.      potassium chloride (MICRO-K) 10 MEQ capsule Take 10 mEq by mouth every day.      traZODone (DESYREL) 50 MG Tab Take 50 mg by mouth as needed.      Multiple Vitamin (MULTI-VITAMINS) Tab Take 1 tablet by mouth every day.      apixaban (ELIQUIS) 5mg Tab Take 5 mg by mouth 2 times a day.       No current facility-administered medications for this visit.       Physical Exam:  Vitals:    10/27/22 0931   BP: 120/80   BP Location: Left arm   Patient Position: Sitting   BP Cuff Size: Adult   Pulse: 64   Resp: 14   SpO2: 97%   Weight: 94.3 kg (208 lb)   Height: 1.905 m (6' 3\")     General appearance: NAD, conversant  HEENT: PERRL, neck is supple with FROM  Lungs: Clear to auscultation, normal respiratory effort  CV: RRR, no murmurs/rubs/gallops, no JVD  Abdomen: Soft, non-tender with normal bowel sounds  Extremities: No peripheral edema, no clubbing or cyanosis  Skin: No rash, lesions, or ulcers  Psych: Alert and oriented to person, place and time    Data:  Labs reviewed    Prior echo/stress reviewed:  LVEF 20-25%    EKG interpreted by me:  BiV paced    Impression/Plan:  1. Paroxysmal atrial fibrillation (HCC)  EKG      2. Biventricular ICD (implantable cardioverter-defibrillator) in place        3. History of mitral valve repair        4. AV block, complete (HCC)        -I would like to transition him to sotalol off amiodarone to see if he would do well on less toxic antiarrhythmic  -Stop amiodarone today  -2 week washout  -Outpatient sotalol load  -F/u after sotalol sally Garza MD    "

## 2022-11-02 RX ORDER — SOTALOL HYDROCHLORIDE 80 MG/1
TABLET ORAL
Qty: 180 TABLET | Refills: 3 | Status: SHIPPED | OUTPATIENT
Start: 2022-11-02 | End: 2023-12-08

## 2022-11-08 ENCOUNTER — PATIENT MESSAGE (OUTPATIENT)
Dept: HEALTH INFORMATION MANAGEMENT | Facility: OTHER | Age: 72
End: 2022-11-08

## 2022-11-16 LAB — EKG IMPRESSION: NORMAL

## 2022-11-17 ENCOUNTER — NON-PROVIDER VISIT (OUTPATIENT)
Dept: CARDIOLOGY | Facility: MEDICAL CENTER | Age: 72
End: 2022-11-17
Payer: MEDICARE

## 2022-11-17 DIAGNOSIS — I48.0 PAROXYSMAL ATRIAL FIBRILLATION (HCC): ICD-10-CM

## 2022-11-17 PROCEDURE — 93000 ELECTROCARDIOGRAM COMPLETE: CPT | Performed by: INTERNAL MEDICINE

## 2022-11-17 NOTE — PROGRESS NOTES
Patient was here today for EKG per Dr. Garza. EKG performed and transferred into patients chart. Paper copy of EKG given to Rosanna LIMON for Dr. Garza.Patient states he's been feeling good.   No questions or concerns for Rosanna.

## 2022-11-21 ENCOUNTER — TELEPHONE (OUTPATIENT)
Dept: CARDIOLOGY | Facility: MEDICAL CENTER | Age: 72
End: 2022-11-21

## 2022-11-21 ENCOUNTER — OFFICE VISIT (OUTPATIENT)
Dept: CARDIOLOGY | Facility: MEDICAL CENTER | Age: 72
End: 2022-11-21
Payer: MEDICARE

## 2022-11-21 VITALS
WEIGHT: 206 LBS | RESPIRATION RATE: 16 BRPM | DIASTOLIC BLOOD PRESSURE: 64 MMHG | SYSTOLIC BLOOD PRESSURE: 118 MMHG | HEART RATE: 76 BPM | BODY MASS INDEX: 25.61 KG/M2 | HEIGHT: 75 IN | OXYGEN SATURATION: 98 %

## 2022-11-21 DIAGNOSIS — I48.0 PAROXYSMAL ATRIAL FIBRILLATION (HCC): ICD-10-CM

## 2022-11-21 DIAGNOSIS — Z98.890 HISTORY OF TRICUSPID VALVE REPAIR: ICD-10-CM

## 2022-11-21 DIAGNOSIS — I10 ESSENTIAL HYPERTENSION, BENIGN: ICD-10-CM

## 2022-11-21 DIAGNOSIS — Z95.3 S/P MITRAL VALVE REPLACEMENT WITH BIOPROSTHETIC VALVE: ICD-10-CM

## 2022-11-21 DIAGNOSIS — Z79.01 CHRONIC ANTICOAGULATION: ICD-10-CM

## 2022-11-21 DIAGNOSIS — Z98.890 HISTORY OF MITRAL VALVE REPAIR: ICD-10-CM

## 2022-11-21 DIAGNOSIS — I50.22 CHRONIC HFREF (HEART FAILURE WITH REDUCED EJECTION FRACTION) (HCC): ICD-10-CM

## 2022-11-21 DIAGNOSIS — I42.8 NONISCHEMIC CARDIOMYOPATHY (HCC): ICD-10-CM

## 2022-11-21 PROCEDURE — 99214 OFFICE O/P EST MOD 30 MIN: CPT | Performed by: INTERNAL MEDICINE

## 2022-11-21 RX ORDER — AMOXICILLIN 500 MG/1
CAPSULE ORAL
COMMUNITY
Start: 2022-11-20 | End: 2022-11-21

## 2022-11-21 ASSESSMENT — FIBROSIS 4 INDEX: FIB4 SCORE: 1.83

## 2022-11-21 ASSESSMENT — ENCOUNTER SYMPTOMS
ABDOMINAL PAIN: 0
LOSS OF CONSCIOUSNESS: 0
INSOMNIA: 0
DIZZINESS: 0
CHILLS: 0
FEVER: 0
PALPITATIONS: 0
PND: 0
BLURRED VISION: 0
BRUISES/BLEEDS EASILY: 0
ORTHOPNEA: 0
SHORTNESS OF BREATH: 0
MYALGIAS: 0

## 2022-11-21 NOTE — TELEPHONE ENCOUNTER
----- Message from Lucas Romero M.D. sent at 11/21/2022  9:30 AM PST -----  Regarding: Lab follow-up  Please contact the patient, after his clinic visit, and after further review his chart I do want him to recheck some labs at his convenience, nonfasting including digoxin and basic metabolic panel

## 2022-11-21 NOTE — PROGRESS NOTES
Chief Complaint   Patient presents with    Congestive Heart Failure     Chronic HFrEF (heart failure with reduced ejection fraction) (HCC)    Atrial Fibrillation     Paroxysmal atrial fibrillation (HCC)    Other     Biventricular ICD (implantable cardioverter-defibrillator) in place       Subjective     Pavan Cueva is a 72 y.o. male who presents today transitioning care from [facility] cardiology back to [facility].    The patient has significant past medical history of S/P mitral valve repair, tricuspid valve repair, MAZE, MYRNA ligation, PPM 7/13/2018 ([place], [place]), PAF, redo S/P mitral valve replacement (33 mm Angeli Underwood Theon pericardial valve) 9/8/2021 ([facility]), cardiac arrest 12/20/2021 ([facility]), CRT-D 12/22/2021 ([facility]), nonischemic cardiomyopathy, HFrEF 25%, amiodarone therapy, hypothyroidism (amiodarone induced), anticoagulation, hypertension, ARIANA.    Since 8/22/2022 appointment the patient has had no cardiac symptoms including chest pain, palpitations, shortness of breath.  Remains active with outdoor activities such as yard work with no limitations, saw EP Dr. Garza in 10/2022 and was transition from amiodarone to sotalol, follow-up EKG 11/17/2022 showed AV pacing, was recently told by clinical pharmacist not to take enalapril if BP is below 120 which it is consistently been though was never having any untoward side effects from having taken enalapril with a BP less than 120 i.e. dizziness, lightheadedness.  Has been using 2 different home BP cuffs 1 and older model compared to a newer one that reads a relatively higher blood pressure though once again BP is consistently been below 120.      Past Medical History:   Diagnosis Date    AF (atrial fibrillation) ([facility])          Anemia     AV block, complete (HCC)     Cardiomyopathy, dilated ([facility]) 03/2022    Echocardiogram with severely dilated LV, LVEF 20-25%. Global  hypokinesis. Normal RV. Severely dilated LA and RV. MV prothesis with elevated transvalvular gradient. Trace AR, mild TR. RVSP 47mmHg.    Chronic anticoagulation     Heart valve disease     Hemorrhagic disorder (HCC)     History of epistaxis    Hypertension     ARIANA (obstructive sleep apnea)     Renal mass     Restrictive lung disease     Sleep apnea     no cpap, 3L O2    Thyroid disease      Past Surgical History:   Procedure Laterality Date    MITRAL VALVE REPLACEMENT  2021    Redo MVR (#33 Angeli-Underwood Theon pericardial valve) by Dr. Chou, [place], [place]    PACEMAKER INSERTION Left 2018    Medtronic [place] XT DR MRI [id number] implanted by Dr. Gillette, [place], [place]    MAZE PROCEDURE  2018    TRICUSPID VALVE REPAIR  2018    MITRAL VALVE REPAIR  2018     MV repair, TV repair, MYRNA ligation, MAZE procedure and biatrial resizing by Dr. Chou at [place], [place].    MITRAL VALVE REPAIR      OTHER      Nasal surgery for nosebleeds     Family History   Problem Relation Age of Onset    Cancer Mother     Stroke Father         CVA at 85, afib    Heart Disease Father      Social History     Socioeconomic History    Marital status:      Spouse name: Not on file    Number of children: Not on file    Years of education: Not on file    Highest education level: Not on file   Occupational History    Not on file   Tobacco Use    Smoking status: Former     Packs/day: 0.00     Types: Cigarettes     Quit date: 1970     Years since quittin.9    Smokeless tobacco: Never   Vaping Use    Vaping Use: Never used   Substance and Sexual Activity    Alcohol use: Yes     Comment: very little    Drug use: Not Currently    Sexual activity: Not on file   Other Topics Concern    Not on file   Social History Narrative    Not on file     Social Determinants of Health     Financial Resource Strain: Not on file   Food Insecurity: Not on file   Transportation Needs: Not on file   Physical  Activity: Not on file   Stress: Not on file   Social Connections: Not on file   Intimate Partner Violence: Not on file   Housing Stability: Not on file     No Known Allergies  Outpatient Encounter Medications as of 11/21/2022   Medication Sig Dispense Refill    sotalol (BETAPACE) 80 MG Tab TAKE 1 TABLET BY MOUTH TWICE DAILY 180 Tablet 3    dapagliflozin propanediol (FARXIGA) 10 MG Tab Take 1 Tablet by mouth every day. 30 Tablet 11    Levothyroxine Sodium 125 MCG Cap Take 125 mcg by mouth every day.      digoxin (LANOXIN) 125 MCG Tab Take 1 Tablet by mouth every day. (Patient taking differently: Take 125 mcg by mouth every 48 hours.) 90 Tablet 3    metoprolol SR (TOPROL XL) 25 MG TABLET SR 24 HR Take 0.5 Tablets by mouth 2 times a day. 90 Tablet 3    Probiotic Product (PROBIOTIC-10) Chew Tab Chew 1 Tablet every day.      ferrous sulfate 325 (65 Fe) MG tablet Take 1 Tablet by mouth every 48 hours.      magnesium oxide (MAG-OX) 400 MG Tab tablet Take 1 tablet by mouth every day.      potassium chloride (MICRO-K) 10 MEQ capsule Take 10 mEq by mouth every day.      traZODone (DESYREL) 50 MG Tab Take 50 mg by mouth as needed.      Multiple Vitamin (MULTI-VITAMINS) Tab Take 1 tablet by mouth every day.      apixaban (ELIQUIS) 5mg Tab Take 5 mg by mouth 2 times a day.      [DISCONTINUED] amoxicillin (AMOXIL) 500 MG Cap  (Patient not taking: Reported on 11/21/2022)      [DISCONTINUED] enalapril (VASOTEC) 2.5 MG Tab TAKE 1 TABLET BY MOUTH EVERY EVENING (Patient not taking: Reported on 11/21/2022) 90 Tablet 3     No facility-administered encounter medications on file as of 11/21/2022.     Review of Systems   Constitutional:  Negative for chills and fever.   HENT:  Negative for congestion.    Eyes:  Negative for blurred vision.   Respiratory:  Negative for shortness of breath.    Cardiovascular:  Negative for chest pain, palpitations, orthopnea, leg swelling and PND.   Gastrointestinal:  Negative for abdominal pain.  "  Genitourinary:  Negative for dysuria.   Musculoskeletal:  Negative for joint pain and myalgias.   Skin:  Negative for rash.   Neurological:  Negative for dizziness and loss of consciousness.   Endo/Heme/Allergies:  Does not bruise/bleed easily.   Psychiatric/Behavioral:  The patient does not have insomnia.          /64 (BP Location: Left arm, Patient Position: Sitting, BP Cuff Size: Adult)   Pulse 76   Resp 16   Ht 1.905 m (6' 3\")   Wt 93.4 kg (206 lb)   SpO2 98%   BMI 25.75 kg/m²     Physical Exam  Vitals reviewed.   Constitutional:       General: He is not in acute distress.     Appearance: He is well-developed.   Eyes:      Conjunctiva/sclera: Conjunctivae normal.      Pupils: Pupils are equal, round, and reactive to light.   Neck:      Vascular: No JVD.   Cardiovascular:      Rate and Rhythm: Normal rate and regular rhythm.      Pulses:           Carotid pulses are 2+ on the right side and 2+ on the left side.     Heart sounds: Murmur heard.     No friction rub. No gallop.      Comments: Midline scar  Generator left subclavian area  Pulmonary:      Effort: Pulmonary effort is normal. No accessory muscle usage or respiratory distress.      Breath sounds: Normal breath sounds. No wheezing or rales.   Musculoskeletal:      Cervical back: Normal range of motion and neck supple.      Right lower leg: No edema.      Left lower leg: No edema.   Skin:     General: Skin is warm and dry.      Findings: No rash.      Nails: There is no clubbing.   Neurological:      Mental Status: He is alert and oriented to person, place, and time.   Psychiatric:         Behavior: Behavior normal.            ECHOCARDIOGRAM 3/18/2022  Severely reduced left ventricular systolic function.   The left ventricular ejection fraction is visually estimated to be 20-25%.   Grade III diastolic dysfunction (restrictive pattern).  Known mitral valve bioprosthesis with elevated transvalvular gradient.  Estimated right ventricular systolic " pressure is 47 mmHg; mild pulmonary hypertension.   No pericardial effusion.    EKG 5/5/2021 AV paced rate 61 personally interpreted.    EKG 11/17/2022 AV pacing, personally reviewed    Assessment & Plan     1. Chronic HFrEF (heart failure with reduced ejection fraction) (HCC)  Basic Metabolic Panel    DIGOXIN      2. Nonischemic cardiomyopathy (HCC)        3. S/P mitral valve replacement with bioprosthetic valve        4. History of mitral valve repair        5. History of tricuspid valve repair        6. Essential hypertension, benign        7. Paroxysmal atrial fibrillation (HCC)        8. Chronic anticoagulation            Medical Decision Making: Today's Assessment/Status/Plan:   Assessment  HFrEF 25%.  Nonischemic cardiomyopathy  S/P MVR (33 mm Angeli Underwood Theon pericardial valve) 9/8/2021 ([facility] [place], [place]).  S/P mitral valve repair, tricuspid valve repair, MAZE, MYRNA ligation, PPM 7/13/2018 ([place], [place])  CRT-D 12/22/2021  6.  PAF.  Amiodarone therapy, started 12/2021, stopped 10/2022; sotalol started  Anticoagulation, apixaban.  Hypothyroidism amiodarone induced, followed by Dr. Mackay endocrinologist [place].  Hypertension.  ARIANA    Recommendation Discussion  HFrEF 25%, clinically stable, NYHA class I, on continue dapagliflozin, metoprolol, digoxin, need to further optimize HF GDMT therefore instructed to restart enalapril 2.5 mg daily as before, monitor for any side effects of lightheadedness, dizziness, optimally should be on spironolactone but has delay depending on response to enalapril to avoid symptomatic hypotension.  PAF, AV pacing, now on sotalol  MVR, functioning normally  Hypertension, restarting enalapril to optimize HF GDMT  Reviewed recent ECG 11/17/2022  Check digoxin level, previously 1.3 and BMP  RTC 6 months.

## 2022-11-21 NOTE — TELEPHONE ENCOUNTER
S/W pt, informed of need for lab work per SW. Sent lab orders via [email or web address] per pt request, he will go to outside lab in [place].

## 2022-11-22 DIAGNOSIS — I50.22 CHRONIC HFREF (HEART FAILURE WITH REDUCED EJECTION FRACTION) (HCC): ICD-10-CM

## 2022-11-28 ENCOUNTER — PATIENT MESSAGE (OUTPATIENT)
Dept: CARDIOLOGY | Facility: MEDICAL CENTER | Age: 72
End: 2022-11-28
Payer: MEDICARE

## 2022-12-01 LAB — EKG IMPRESSION: NORMAL

## 2022-12-09 ENCOUNTER — PATIENT MESSAGE (OUTPATIENT)
Dept: CARDIOLOGY | Facility: MEDICAL CENTER | Age: 72
End: 2022-12-09
Payer: MEDICARE

## 2022-12-09 DIAGNOSIS — I50.22 CHRONIC HFREF (HEART FAILURE WITH REDUCED EJECTION FRACTION) (HCC): ICD-10-CM

## 2022-12-18 ENCOUNTER — PATIENT MESSAGE (OUTPATIENT)
Dept: CARDIOLOGY | Facility: MEDICAL CENTER | Age: 72
End: 2022-12-18
Payer: MEDICARE

## 2022-12-19 NOTE — PROGRESS NOTES
"Is low BP new?  Any symptoms of dizziness? Shortness of breath?  Was using 2 different BP machines; should be using the newer one  Is the BP the same on both arms? Should be recording the arm with the highest BP.  Should recheck kidney function; BMP ordered; nonfasting    The context regarding the pharmacist with he had been told to hold enalapril for BP of less that 120/; that threshold to hold enalapril is too high. He can continue to see the pharmacist and if the pharmacist has any questions they can contact me.    The patient \"was recently told by clinical pharmacist not to take enalapril if BP is below 120 which it is consistently been though was never having any untoward side effects from having taken enalapril with a BP less than 120 i.e. dizziness, lightheadedness.  Has been using 2 different home BP cuffs 1 and older model compared to a newer one that reads a relatively higher blood pressure though once again BP is consistently been below 120.\"  "

## 2022-12-22 ENCOUNTER — NON-PROVIDER VISIT (OUTPATIENT)
Dept: CARDIOLOGY | Facility: MEDICAL CENTER | Age: 72
End: 2022-12-22
Payer: MEDICARE

## 2022-12-22 VITALS — SYSTOLIC BLOOD PRESSURE: 121 MMHG | DIASTOLIC BLOOD PRESSURE: 54 MMHG | BODY MASS INDEX: 26.12 KG/M2 | WEIGHT: 209 LBS

## 2022-12-22 PROCEDURE — 99211 OFF/OP EST MAY X REQ PHY/QHP: CPT | Performed by: INTERNAL MEDICINE

## 2022-12-22 ASSESSMENT — FIBROSIS 4 INDEX: FIB4 SCORE: 1.71

## 2022-12-22 NOTE — PROGRESS NOTES
CHF Pharmacotherapy visit - Visit    Date of Service: 22    Informed written consent was given on: 22    Pavan Cueva is here for CHF     HPI  Pertinent Interval History since last visit:   Patient met with Dr Romero, patient very concerned about hypotension. Otherwise has been feeling well since last visit.    Most recent EF:  25% (22)      Current Outpatient Medications:     sotalol, TAKE 1 TABLET BY MOUTH TWICE DAILY, Taking    dapagliflozin propanediol, 10 mg, Oral, DAILY, Taking    Levothyroxine Sodium, 125 mcg, Oral, DAILY, Taking    digoxin, 125 mcg, Oral, DAILY (Patient taking differently: 125 mcg, Oral, EVERY 48 HOURS, Tues, Thurs, Sat Sun), Taking    metoprolol SR, 12.5 mg, Oral, BID, Taking    Probiotic-10, 1 Tablet, Oral, DAILY, Taking    ferrous sulfate, 1 Tablet, Oral, Q48HRS, Taking    magnesium oxide, 1 Tablet, Oral, DAILY, Taking    potassium chloride, 10 mEq, Oral, DAILY, Taking    traZODone, 50 mg, Oral, PRN, PRN    Multi-Vitamins, 1 Tablet, Oral, DAILY, Taking    apixaban, 5 mg, Oral, BID, Taking    Current Adherence to CHF Therapies:  Complete     Current CHF Medications - including dose:   Entresto or ACE/ARB: enalapril 2.5 mg daily - taking PRN  Beta blocker: metoprolol SR 25 mg (0.5 tab) mg BID  furosemide 20 mg PRN if weight gain of more than 3 lbs in a day  Aldosterone antagonist: none  SGLT2i: Farxiga 10 mg daily  Digoxin 125 mcg every other day    Home BP and HR:   SBP , most in the 100-105's  DBP 50-60's    Change in weight: Stable     Exercise habits:  Does yardwork/chores around the house    Diet: common adult     SOCIAL HISTORY  Social History     Tobacco Use   Smoking Status Former    Packs/day: 0.00    Types: Cigarettes    Quit date: 1970    Years since quittin.0   Smokeless Tobacco Never        DATA REVIEW  Vitals:    22 1038   BP: 121/54           No results found for: HBA1C       Lab Results   Component Value Date/Time    CHOLSTRLTOT  165 03/01/2022 04:47 AM    CHOLSTRLTOT 149 06/01/2021 08:39 AM    LDL 80 06/01/2021 08:39 AM    HDL 59 03/01/2022 04:47 AM    HDL 63 06/01/2021 08:39 AM    TRIGLYCERIDE 59 03/01/2022 04:47 AM    TRIGLYCERIDE 28 06/01/2021 08:39 AM       Lab Results   Component Value Date/Time    SODIUM 139 11/22/2022 10:55 AM    SODIUM 140 03/01/2022 04:47 AM    SODIUM 137 06/01/2021 08:39 AM    POTASSIUM 4.5 11/22/2022 10:55 AM    POTASSIUM 4.6 03/01/2022 04:47 AM    POTASSIUM 4.7 06/01/2021 08:39 AM    CHLORIDE 105 11/22/2022 10:55 AM    CHLORIDE 100 03/01/2022 04:47 AM    CHLORIDE 102 06/01/2021 08:39 AM    CO2 30 11/22/2022 10:55 AM    CO2 28 03/01/2022 04:47 AM    CO2 28 06/01/2021 08:39 AM    GLUCOSE 70 11/22/2022 10:55 AM    GLUCOSE 95 03/01/2022 04:47 AM    GLUCOSE 91 06/01/2021 08:39 AM    BUN 19 11/22/2022 10:55 AM    BUN 20 03/01/2022 04:47 AM    BUN 22 06/01/2021 08:39 AM    CREATININE 1.11 11/22/2022 10:55 AM    CREATININE 1.13 03/01/2022 04:47 AM    CREATININE 0.85 06/01/2021 08:39 AM    BUNCREATRAT 18 03/01/2022 04:47 AM    GLOMRATE 65 11/22/2022 10:55 AM     Lab Results   Component Value Date/Time    ALKPHOSPHAT 44 08/23/2022 11:56 AM    ALKPHOSPHAT 59 06/01/2021 08:39 AM    ASTSGOT 17 08/23/2022 11:56 AM    ASTSGOT 22 06/01/2021 08:39 AM    ALTSGPT 14 (L) 08/23/2022 11:56 AM    ALTSGPT 20 06/01/2021 08:39 AM    TBILIRUBIN 0.7 08/23/2022 11:56 AM    TBILIRUBIN 1.0 06/01/2021 08:39 AM    INR 1.17 (H) 06/03/2021 11:57 AM    ALBUMIN 4.6 08/23/2022 11:56 AM    ALBUMIN 4.3 06/01/2021 08:39 AM      No components found for: MICROALBUMINCREATRATIOURINE    Renal function:  Calculated creatinine clearance: ~80 ml/min     Other Pertinent Blood Work: None    Other:  Immunization History   Administered Date(s) Administered    Influenza Vac Subunit Quad Inj (Pf) 11/26/2018    Influenza Vaccine Adult HD 11/14/2019    Influenza Vaccine Quad Inj (Pf) 10/08/2020, 10/08/2020    Influenza, Unspecified - HISTORICAL DATA 11/14/2019     "PFIZER PURPLE CAP SARS-COV-2 VACCINATION (12+) 07/30/2021, 08/21/2021    Pneumococcal Conjugate Vaccine (Prevnar/PCV-13) 09/11/2019    Tdap Vaccine 07/30/2010     Up to date on pneumococcal vaccine? Due for PPSV23, will remind at next visit    Recent Imaging Studies:    None      ASSESSMENT AND PLAN    Reinforced pt on basic pathophysiology and symptom management, as well as the importance of GDMT.   Patient hesitant to trial Enalapril 2.5 mg - due to 2 past events of hypotension that caused him distress in public settings.  Patient BP is on low end, patient is having no symptomatic hypotension.   Patient to trial Enalapril 2.5 mg daily - contact clinic if any SBP <90 and hold enalapril  Check BP daily and whenever patients feels \"off\"    Resulting CHF medications today (changes are bolded)  Entresto or ACE/ARB: Enalapril 2.5 mg daily  Beta blocker: metoprolol SR 25 mg (0.5 tab) mg BID  Diuretic: furosemide 20 mg PRN if weight gain of more than 3 lbs in a day  SGLT2i: Farxiga 10 mg daily   Digoxin 125 mcg every other day    Lifestyle Recommendations From Today's Visit:   Continue to limit fluid intake to 2-3L/day and Na+ intake to 2gm/day    Significant changes to laboratory values since last visit that require repeat labs:  N/a    Blood Work Ordered At Today's visit:   BMP pending - 2-3 weeks after initiation of enalapril if tolerated    Studies Ordered at Todays Visit:  None     Follow-Up:   2 months    Kimberly Alston, PharmD    CC: Dr Romero  "

## 2022-12-29 ENCOUNTER — TELEPHONE (OUTPATIENT)
Dept: CARDIOLOGY | Facility: MEDICAL CENTER | Age: 72
End: 2022-12-29
Payer: MEDICARE

## 2022-12-29 NOTE — TELEPHONE ENCOUNTER
FYI--patients device transmitted via home monitor.  Patient had VT episode with ATP delivered 11/28/22    Report scanned for review

## 2022-12-29 NOTE — TELEPHONE ENCOUNTER
Please see transmission from 11/28, we are over a month out from this, if you'd like me to get any info from pt, please just let me know.

## 2022-12-31 DIAGNOSIS — I48.0 PAF (PAROXYSMAL ATRIAL FIBRILLATION) (HCC): ICD-10-CM

## 2023-01-03 RX ORDER — METOPROLOL SUCCINATE 25 MG/1
12.5 TABLET, EXTENDED RELEASE ORAL 2 TIMES DAILY
Qty: 90 TABLET | Refills: 1 | Status: SHIPPED | OUTPATIENT
Start: 2023-01-03 | End: 2023-03-07 | Stop reason: SINTOL

## 2023-01-03 NOTE — TELEPHONE ENCOUNTER
Is the patient due for a refill? Yes    Was the patient seen the past year? Yes    Date of last office visit: 11/21/2022    Does the patient have an upcoming appointment?  Yes   If yes, When? 4/17/2023    Provider to refill:MATTHEW    Does the patients insurance require a 100 day supply?  No

## 2023-01-30 ENCOUNTER — TELEPHONE (OUTPATIENT)
Dept: CARDIOLOGY | Facility: MEDICAL CENTER | Age: 73
End: 2023-01-30
Payer: MEDICARE

## 2023-01-30 ENCOUNTER — PATIENT MESSAGE (OUTPATIENT)
Dept: CARDIOLOGY | Facility: MEDICAL CENTER | Age: 73
End: 2023-01-30
Payer: MEDICARE

## 2023-01-30 DIAGNOSIS — I47.20 VENTRICULAR TACHYCARDIA (HCC): ICD-10-CM

## 2023-01-30 DIAGNOSIS — E03.9 HYPOTHYROIDISM, UNSPECIFIED TYPE: ICD-10-CM

## 2023-01-30 NOTE — TELEPHONE ENCOUNTER
Patients device transmitted via home monitor-- patient had VT episode 1/29/2023 @ 6:25 am with ATP delivered--appropriate and successful.    Scanned for review

## 2023-01-30 NOTE — TELEPHONE ENCOUNTER
MATTHEW      Caller: Pavan Cueva      Topic/issue: Patient was calling from the lab office in [place] to have his labs sent over so that he could complete them.   Fax# [phone]      Callback Number: [phone]      Thank you    -Pantera BARRERA

## 2023-01-30 NOTE — TELEPHONE ENCOUNTER
Would have him stop digoxin  Check BMP and magnesium level, ordered  Will have Dr. Garza review to see if he wishes to increase sotalol dose

## 2023-01-30 NOTE — TELEPHONE ENCOUNTER
Phone Number Called: [phone]    Call outcome: Spoke to patient regarding message below.    Message: Called to discuss SW recommendations.   Have not been taking metoprolol, he and wife had covid and he didn't want to be experimenting new medications with covid. He was lightheaded  during episode and has had encounter before, denies chest pain, no symptoms since then. Advised will notify SW and get recommendations. Answered all questions and concerns, appreciative of call.     SW: Patient will complete labs today, has not started metoprolol.Symptomatic during episode. Please advise. Thanks!

## 2023-02-01 DIAGNOSIS — E03.9 HYPOTHYROIDISM, UNSPECIFIED TYPE: ICD-10-CM

## 2023-02-04 NOTE — RESULT ENCOUNTER NOTE
Please notify Pavan that I have reviewed his laboratory at [facility] and kidney function and electrolytes look good

## 2023-02-08 ENCOUNTER — PATIENT MESSAGE (OUTPATIENT)
Dept: CARDIOLOGY | Facility: MEDICAL CENTER | Age: 73
End: 2023-02-08
Payer: MEDICARE

## 2023-02-09 ENCOUNTER — NON-PROVIDER VISIT (OUTPATIENT)
Dept: CARDIOLOGY | Facility: MEDICAL CENTER | Age: 73
End: 2023-02-09
Payer: MEDICARE

## 2023-02-09 VITALS
WEIGHT: 205.8 LBS | BODY MASS INDEX: 25.72 KG/M2 | SYSTOLIC BLOOD PRESSURE: 115 MMHG | DIASTOLIC BLOOD PRESSURE: 56 MMHG | HEART RATE: 59 BPM

## 2023-02-09 PROCEDURE — 99211 OFF/OP EST MAY X REQ PHY/QHP: CPT | Performed by: INTERNAL MEDICINE

## 2023-02-09 ASSESSMENT — FIBROSIS 4 INDEX: FIB4 SCORE: 1.84

## 2023-02-09 NOTE — PROGRESS NOTES
CHF Pharmacotherapy visit - Visit    Date of Service: 23    Informed written consent was given on: 22    Pavan Cueva is here for CHF     HPI  Pertinent Interval History since last visit:   Patient did not trial enalapril, due to patient concerns for COVID. Two episodes of dizziness since last visit. Patient states he is anxious about hypotension and dizziness impairing his ability to drive.     Most recent EF:  25% (22)      Current Outpatient Medications:     metoprolol SR, 12.5 mg, Oral, BID    sotalol, TAKE 1 TABLET BY MOUTH TWICE DAILY    dapagliflozin propanediol, 10 mg, Oral, DAILY    Levothyroxine Sodium, 125 mcg, Oral, DAILY    digoxin, 125 mcg, Oral, DAILY (Patient taking differently: 125 mcg, Oral, EVERY 48 HOURS, Tu, Thurs, Sat Sun)    Probiotic-10, 1 Tablet, Oral, DAILY    ferrous sulfate, 1 Tablet, Oral, Q48HRS    magnesium oxide, 1 Tablet, Oral, DAILY    potassium chloride, 10 mEq, Oral, DAILY    traZODone, 50 mg, Oral, PRN    Multi-Vitamins, 1 Tablet, Oral, DAILY    apixaban, 5 mg, Oral, BID    Current Adherence to CHF Therapies:  Complete     Current CHF Medications - including dose:   Entresto or ACE/ARB: none (has enalapril 2.5 mg on hand)  Beta blocker: metoprolol SR 25 mg (0.5 tab) mg BID  furosemide 20 mg PRN if weight gain of more than 3 lbs in a day - has not needed  Aldosterone antagonist: none  SGLT2i: Farxiga 10 mg daily  Digoxin 125 mcg every other day    Home BP and HR:   SBP , most in the low 100's  Lowest 98/59   DBP 59-86, most in the 70's    Change in weight: Stable     Exercise habits:  Does yardwork/chores around the house    Diet: common adult     SOCIAL HISTORY  Social History     Tobacco Use   Smoking Status Former    Packs/day: 0.00    Types: Cigarettes    Quit date: 1970    Years since quittin.1   Smokeless Tobacco Never        DATA REVIEW  There were no vitals filed for this visit.          No results found for: HBA1C       Lab Results    Component Value Date/Time    CHOLSTRLTOT 165 03/01/2022 04:47 AM    CHOLSTRLTOT 149 06/01/2021 08:39 AM    LDL 80 06/01/2021 08:39 AM    HDL 59 03/01/2022 04:47 AM    HDL 63 06/01/2021 08:39 AM    TRIGLYCERIDE 59 03/01/2022 04:47 AM    TRIGLYCERIDE 28 06/01/2021 08:39 AM       Lab Results   Component Value Date/Time    SODIUM 139 01/30/2023 01:56 PM    SODIUM 140 03/01/2022 04:47 AM    SODIUM 137 06/01/2021 08:39 AM    POTASSIUM 4.7 01/30/2023 01:56 PM    POTASSIUM 4.6 03/01/2022 04:47 AM    POTASSIUM 4.7 06/01/2021 08:39 AM    CHLORIDE 104 01/30/2023 01:56 PM    CHLORIDE 100 03/01/2022 04:47 AM    CHLORIDE 102 06/01/2021 08:39 AM    CO2 33 (H) 01/30/2023 01:56 PM    CO2 28 03/01/2022 04:47 AM    CO2 28 06/01/2021 08:39 AM    GLUCOSE 75 01/30/2023 01:56 PM    GLUCOSE 95 03/01/2022 04:47 AM    GLUCOSE 91 06/01/2021 08:39 AM    BUN 26 (H) 01/30/2023 01:56 PM    BUN 20 03/01/2022 04:47 AM    BUN 22 06/01/2021 08:39 AM    CREATININE 1.07 01/30/2023 01:56 PM    CREATININE 1.13 03/01/2022 04:47 AM    CREATININE 0.85 06/01/2021 08:39 AM    BUNCREATRAT 18 03/01/2022 04:47 AM    GLOMRATE 68 01/30/2023 01:56 PM     Lab Results   Component Value Date/Time    ALKPHOSPHAT 46 12/23/2022 09:33 AM    ALKPHOSPHAT 59 06/01/2021 08:39 AM    ASTSGOT 18 12/23/2022 09:33 AM    ASTSGOT 22 06/01/2021 08:39 AM    ALTSGPT 15 (L) 12/23/2022 09:33 AM    ALTSGPT 20 06/01/2021 08:39 AM    TBILIRUBIN 0.8 12/23/2022 09:33 AM    TBILIRUBIN 1.0 06/01/2021 08:39 AM    INR 1.17 (H) 06/03/2021 11:57 AM    ALBUMIN 4.3 12/23/2022 09:33 AM    ALBUMIN 4.3 06/01/2021 08:39 AM      No components found for: MICROALBUMINCREATRATIOURINE    Renal function:  Calculated creatinine clearance: ~80 ml/min     Other Pertinent Blood Work: None    Other:  Immunization History   Administered Date(s) Administered    Influenza Vac Subunit Quad Inj (Pf) 11/26/2018    Influenza Vaccine Adult HD 11/14/2019    Influenza Vaccine Quad Inj (Pf) 10/08/2020, 10/08/2020     "Influenza, Unspecified - HISTORICAL DATA 11/14/2019    PFIZER PURPLE CAP SARS-COV-2 VACCINATION (12+) 07/30/2021, 08/21/2021    Pneumococcal Conjugate Vaccine (Prevnar/PCV-13) 09/11/2019    Tdap Vaccine 07/30/2010     Up to date on pneumococcal vaccine? Due for PPSV23, will remind at next visit    Recent Imaging Studies:    None      ASSESSMENT AND PLAN    Patient has not trialed Enalapril - he was concerned about possible COVID exposure and did not trial  Patient BP is on low end, patient had two events of dizziness, no clear correlation (one in the AM and one in the PM)   Check BP daily and whenever patients feels \"off\"  Will decrease Farxiga to 5mg - patient states he is urinating frequently at night and it may help to improve BP to allow for GDMT to be initiated  Consider trial of Enalapril 2.5 mg daily if BP stable/dizziness improved at next visit -     Resulting CHF medications today (changes are bolded)  Entresto or ACE/ARB: None  Beta blocker: metoprolol SR 25 mg (0.5 tab) mg BID  Diuretic: furosemide 20 mg PRN if weight gain of more than 3 lbs in a day  SGLT2i: Decrease to Farxiga 5 mg daily  Digoxin 125 mcg every other day    Lifestyle Recommendations From Today's Visit:   Continue to limit fluid intake to 2-3L/day and Na+ intake to 2gm/day    Significant changes to laboratory values since last visit that require repeat labs:  N/a    Blood Work Ordered At Today's visit:   None    Studies Ordered at Todays Visit:  None     Follow-Up:   1.5 months, per patient preference    Kimberly Alston, Arlen    CC: Dr Romero  "

## 2023-03-03 ENCOUNTER — PATIENT MESSAGE (OUTPATIENT)
Dept: CARDIOLOGY | Facility: MEDICAL CENTER | Age: 73
End: 2023-03-03
Payer: MEDICARE

## 2023-03-03 NOTE — TELEPHONE ENCOUNTER
"Please advise:  S/W pt, he states he is still having dizziness/light-headed episodes, \"very random and is not aware of any triggers. He hydrates well daily, says SBP staying around 94-95--goal with providers has been around 100 or a little higher. Diastolic running 59-62 and pulse rates around 70.     Confirmed med dosing with pt as follows:   metoprolol 12.5mg BID, Farxiga was reduced with recent pharmacist visit to 5mg daily (note in Epic). Pt says he is also taking sotolol 80mg BID. He recently stopped furosemide, stopped digoxin.    We went over things to do when BP dropping low: drink water, elevate feet, small salty snack.   "

## 2023-03-05 NOTE — PROGRESS NOTES
"Under the circumstances with low blood pressure I would recommend stopping the metoprolol and continue sotalol  Hopefully this will help    S/W pt, he states he is still having dizziness/light-headed episodes, \"very random and is not aware of any triggers. He hydrates well daily, says SBP staying around 94-95--goal with providers has been around 100 or a little higher. Diastolic running 59-62 and pulse rates around 70.   Confirmed med dosing with pt as follows:   metoprolol 12.5mg BID, Farxiga was reduced with recent pharmacist visit to 5mg daily (note in Epic). Pt says he is also taking sotolol 80mg BID. He recently stopped furosemide, stopped digoxin.  "

## 2023-03-16 ENCOUNTER — PATIENT MESSAGE (OUTPATIENT)
Dept: CARDIOLOGY | Facility: MEDICAL CENTER | Age: 73
End: 2023-03-16
Payer: MEDICARE

## 2023-03-16 DIAGNOSIS — I50.22 CHRONIC HFREF (HEART FAILURE WITH REDUCED EJECTION FRACTION) (HCC): ICD-10-CM

## 2023-03-17 RX ORDER — DAPAGLIFLOZIN 5 MG/1
5 TABLET, FILM COATED ORAL DAILY
Qty: 30 TABLET | Refills: 3 | Status: SHIPPED | OUTPATIENT
Start: 2023-03-17 | End: 2023-10-02 | Stop reason: SDUPTHER

## 2023-03-17 NOTE — TELEPHONE ENCOUNTER
Reviewed pharmacist note 2/9/23---noted at visit as follows:         Will decrease Farxiga to 5mg - patient states he is urinating frequently at night and it may help to improve BP to allow for GDMT to be initiated    Pt asking for Farxiga 5mg Rx to be sent in if he is to continue this dosing. The last Rx SW sent was for 10mg. Ok to send 5mg?

## 2023-03-23 ENCOUNTER — NON-PROVIDER VISIT (OUTPATIENT)
Dept: CARDIOLOGY | Facility: MEDICAL CENTER | Age: 73
End: 2023-03-23
Payer: MEDICARE

## 2023-03-23 VITALS
WEIGHT: 207 LBS | HEART RATE: 60 BPM | SYSTOLIC BLOOD PRESSURE: 103 MMHG | BODY MASS INDEX: 25.87 KG/M2 | DIASTOLIC BLOOD PRESSURE: 55 MMHG

## 2023-03-23 DIAGNOSIS — I50.22 CHRONIC HFREF (HEART FAILURE WITH REDUCED EJECTION FRACTION) (HCC): ICD-10-CM

## 2023-03-23 PROCEDURE — 99211 OFF/OP EST MAY X REQ PHY/QHP: CPT | Performed by: INTERNAL MEDICINE

## 2023-03-23 RX ORDER — LEVOTHYROXINE SODIUM 112 UG/1
112 TABLET ORAL
COMMUNITY
End: 2023-10-20

## 2023-03-23 ASSESSMENT — FIBROSIS 4 INDEX: FIB4 SCORE: 1.87

## 2023-03-23 NOTE — Clinical Note
Hi Dr Romero,  Patient unable to tolerate GDMT without symptomatic hypotension. Would you prefer patient be on enalapril or Farxiga? He is currently tolerating Farxiga 5 mg.   Thanks for your assistance,  Kimberly Alston, PharmD

## 2023-03-23 NOTE — PROGRESS NOTES
"CHF Pharmacotherapy visit - Visit    Date of Service: 3/23/23    Informed written consent was given on: 22    Pavan Cueva is here for CHF     HPI  Pertinent Interval History since last visit:   Patient stopped metoprolol since last visit, continuing on Sotalol. Patient states he does not feel any different.  2 dizzy spells since last visit, lightheaded and \"nightmarish\" he feels fearful during these settings. He does not believe that this is psychological because it comes and goes and not exacerbated by stressful situations.    Most recent EF:  25% (22)      Current Outpatient Medications:     dapagliflozin propanediol, 5 mg, Oral, DAILY    sotalol, TAKE 1 TABLET BY MOUTH TWICE DAILY    Levothyroxine Sodium, 112 mcg, Oral, DAILY    digoxin, 125 mcg, Oral, DAILY (Patient taking differently: 125 mcg, Oral, EVERY 48 HOURS, Tues, Thurs, Sat Sun)    Probiotic-10, 1 Tablet, Oral, DAILY    ferrous sulfate, 1 Tablet, Oral, Q48HRS    magnesium oxide, 1 Tablet, Oral, DAILY    potassium chloride, 10 mEq, Oral, DAILY    Multi-Vitamins, 1 Tablet, Oral, DAILY    apixaban, 5 mg, Oral, BID    Current Adherence to CHF Therapies:  Complete     Current CHF Medications - including dose:   Entresto or ACE/ARB: none (has enalapril 2.5 mg on hand)  Beta blocker:   furosemide 20 mg PRN if weight gain of more than 3 lbs in a day - has not needed  Aldosterone antagonist: none  SGLT2i: Farxiga 5 mg daily    Home BP and HR:   SBP , most in the low 100's  Lowest 89/57   DBP 65-70, most in the high 70's    Change in weight: Stable     Exercise habits:  Does yardwork/chores around the house    Diet: common adult     SOCIAL HISTORY  Social History     Tobacco Use   Smoking Status Former    Packs/day: 0.00    Types: Cigarettes    Quit date: 1970    Years since quittin.2   Smokeless Tobacco Never        DATA REVIEW  There were no vitals filed for this visit.          No results found for: HBA1C       Lab Results "   Component Value Date/Time    CHOLSTRLTOT 165 03/01/2022 04:47 AM    CHOLSTRLTOT 149 06/01/2021 08:39 AM    LDL 80 06/01/2021 08:39 AM    HDL 59 03/01/2022 04:47 AM    HDL 63 06/01/2021 08:39 AM    TRIGLYCERIDE 59 03/01/2022 04:47 AM    TRIGLYCERIDE 28 06/01/2021 08:39 AM       Lab Results   Component Value Date/Time    SODIUM 139 01/30/2023 01:56 PM    SODIUM 140 03/01/2022 04:47 AM    SODIUM 137 06/01/2021 08:39 AM    POTASSIUM 4.7 01/30/2023 01:56 PM    POTASSIUM 4.6 03/01/2022 04:47 AM    POTASSIUM 4.7 06/01/2021 08:39 AM    CHLORIDE 104 01/30/2023 01:56 PM    CHLORIDE 100 03/01/2022 04:47 AM    CHLORIDE 102 06/01/2021 08:39 AM    CO2 33 (H) 01/30/2023 01:56 PM    CO2 28 03/01/2022 04:47 AM    CO2 28 06/01/2021 08:39 AM    GLUCOSE 75 01/30/2023 01:56 PM    GLUCOSE 95 03/01/2022 04:47 AM    GLUCOSE 91 06/01/2021 08:39 AM    BUN 26 (H) 01/30/2023 01:56 PM    BUN 20 03/01/2022 04:47 AM    BUN 22 06/01/2021 08:39 AM    CREATININE 1.07 01/30/2023 01:56 PM    CREATININE 1.13 03/01/2022 04:47 AM    CREATININE 0.85 06/01/2021 08:39 AM    BUNCREATRAT 18 03/01/2022 04:47 AM    GLOMRATE 68 01/30/2023 01:56 PM     Lab Results   Component Value Date/Time    ALKPHOSPHAT 46 12/23/2022 09:33 AM    ALKPHOSPHAT 59 06/01/2021 08:39 AM    ASTSGOT 18 12/23/2022 09:33 AM    ASTSGOT 22 06/01/2021 08:39 AM    ALTSGPT 15 (L) 12/23/2022 09:33 AM    ALTSGPT 20 06/01/2021 08:39 AM    TBILIRUBIN 0.8 12/23/2022 09:33 AM    TBILIRUBIN 1.0 06/01/2021 08:39 AM    INR 1.17 (H) 06/03/2021 11:57 AM    ALBUMIN 4.3 12/23/2022 09:33 AM    ALBUMIN 4.3 06/01/2021 08:39 AM      No components found for: MICROALBUMINCREATRATIOURINE    Renal function:  Calculated creatinine clearance: ~80 ml/min     Other Pertinent Blood Work: None    Other:  Immunization History   Administered Date(s) Administered    Influenza Vac Subunit Quad Inj (Pf) 11/26/2018    Influenza Vaccine Adult HD 11/14/2019    Influenza Vaccine Quad Inj (Pf) 10/08/2020, 10/08/2020     Influenza, Unspecified - HISTORICAL DATA 11/14/2019    PFIZER PURPLE CAP SARS-COV-2 VACCINATION (12+) 07/30/2021, 08/21/2021    Pneumococcal Conjugate Vaccine (Prevnar/PCV-13) 09/11/2019    Tdap Vaccine 07/30/2010     Up to date on pneumococcal vaccine? Due for PPSV23, will remind at next visit    Recent Imaging Studies:    None      ASSESSMENT AND PLAN    Despite changes to medications, patient continues to have dizzy spells and low BP.   No changes to BP around times when he feels dizzy, states they happen randomly and come and go  No change to BP since reducing Farxiga to 5 mg  It is possible that dizziness could be caused from Sotalol or poor cardiac function.   Patient cannot tolerate HF GDMT due to symptomatic hypotension. Will discuss with cardiology further strategies for patient.     Resulting CHF medications today (changes are bolded)  Entresto or ACE/ARB: None  Beta blocker: Sotalol 80 mg BID  Diuretic: furosemide 20 mg PRN if weight gain of more than 3 lbs in a day   SGLT2i: Farxiga 5 mg daily    Lifestyle Recommendations From Today's Visit:   Continue to limit fluid intake to 2-3L/day and Na+ intake to 2gm/day    Significant changes to laboratory values since last visit that require repeat labs:  N/a    Blood Work Ordered At Today's visit:   BMP    Studies Ordered at Todays Visit:  None     Follow-Up:   3 months w/ pharmacotherapy, 1 month Dr Garza, 2 months Dr Heather Alston, PharmD    CC: Dr Romero

## 2023-03-30 ENCOUNTER — NON-PROVIDER VISIT (OUTPATIENT)
Dept: CARDIOLOGY | Facility: MEDICAL CENTER | Age: 73
End: 2023-03-30
Payer: MEDICARE

## 2023-03-30 PROCEDURE — 93295 DEV INTERROG REMOTE 1/2/MLT: CPT | Performed by: INTERNAL MEDICINE

## 2023-03-30 NOTE — CARDIAC REMOTE MONITOR - SCAN
Device transmission reviewed. Device demonstrated appropriate function. Some AF undersensing      Electronically Signed by: Pavan Alston M.D.    4/8/2023  9:42 AM

## 2023-04-17 ENCOUNTER — OFFICE VISIT (OUTPATIENT)
Dept: CARDIOLOGY | Facility: MEDICAL CENTER | Age: 73
End: 2023-04-17
Payer: MEDICARE

## 2023-04-17 VITALS
SYSTOLIC BLOOD PRESSURE: 110 MMHG | OXYGEN SATURATION: 98 % | HEART RATE: 76 BPM | WEIGHT: 203 LBS | BODY MASS INDEX: 25.24 KG/M2 | RESPIRATION RATE: 14 BRPM | HEIGHT: 75 IN | DIASTOLIC BLOOD PRESSURE: 72 MMHG

## 2023-04-17 DIAGNOSIS — I42.8 NONISCHEMIC CARDIOMYOPATHY (HCC): ICD-10-CM

## 2023-04-17 DIAGNOSIS — Z79.899 ENCOUNTER FOR MONITORING SOTALOL THERAPY: ICD-10-CM

## 2023-04-17 DIAGNOSIS — I50.22 CHRONIC SYSTOLIC CHF (CONGESTIVE HEART FAILURE), NYHA CLASS 2 (HCC): ICD-10-CM

## 2023-04-17 DIAGNOSIS — Z51.81 ENCOUNTER FOR MONITORING SOTALOL THERAPY: ICD-10-CM

## 2023-04-17 DIAGNOSIS — I48.0 PAROXYSMAL ATRIAL FIBRILLATION (HCC): ICD-10-CM

## 2023-04-17 DIAGNOSIS — I42.0 DILATED CARDIOMYOPATHY (HCC): ICD-10-CM

## 2023-04-17 DIAGNOSIS — Z95.810 BIVENTRICULAR ICD (IMPLANTABLE CARDIOVERTER-DEFIBRILLATOR) IN PLACE: ICD-10-CM

## 2023-04-17 DIAGNOSIS — I44.2 AV BLOCK, COMPLETE (HCC): ICD-10-CM

## 2023-04-17 PROCEDURE — 99214 OFFICE O/P EST MOD 30 MIN: CPT | Mod: 25 | Performed by: INTERNAL MEDICINE

## 2023-04-17 PROCEDURE — 93000 ELECTROCARDIOGRAM COMPLETE: CPT | Mod: 59 | Performed by: INTERNAL MEDICINE

## 2023-04-17 PROCEDURE — 93284 PRGRMG EVAL IMPLANTABLE DFB: CPT | Performed by: INTERNAL MEDICINE

## 2023-04-17 ASSESSMENT — FIBROSIS 4 INDEX: FIB4 SCORE: 1.87

## 2023-04-17 NOTE — PROGRESS NOTES
Arrhythmia Clinic Note (Established patient)    DOS: 4/17/2023    Chief complaint/Reason for consult: F/u CRT-D/AF/VT/sotalol    Interval History:  Pt is a 72 yo M. History of complete heart block, s/p prior mitral valve surgery + MAZE, complicated by mitral valve ring dehiscence, repair and now s/p bioprosthetic MVR. Has CRT-D implanted by Dr. Gutierrez. Here for follow-up. He had been on amiodarone, I had switched him over to sotalol. Since last time I saw him had a few VT events treated successfully with ATP, though trigger for therapy relatively fast, may have just been nonsustained VT event. Remains on Eliquis though MYRNA was reportedly ligated. LV function remains 25%, reports stable NYHA II symptoms.    ROS (+ highlighted in red):  General--Negative for fatigue, weight loss or weight gain  Cardiovascular--Negative for CP, orthopnea, PND    Past Medical History:   Diagnosis Date    AF (atrial fibrillation) ([facility])          Anemia     AV block, complete ([facility])     Cardiomyopathy, dilated ([facility]) 03/2022    Echocardiogram with severely dilated LV, LVEF 20-25%. Global hypokinesis. Normal RV. Severely dilated LA and RV. MV prothesis with elevated transvalvular gradient. Trace AR, mild TR. RVSP 47mmHg.    Chronic anticoagulation     Heart valve disease     Hemorrhagic disorder ([facility])     History of epistaxis    Hypertension     ARIANA (obstructive sleep apnea)     Renal mass     Restrictive lung disease     Sleep apnea     no cpap, 3L O2    Thyroid disease        Past Surgical History:   Procedure Laterality Date    MITRAL VALVE REPLACEMENT  09/08/2021    Redo MVR (#33 Angeli-Underwood Theon pericardial [place]    PACEMAKER INSERTION Left 07/13/2018    Medtronic Dionne XT DR JIN [id number] [place]    MAZE PROCEDURE  05/2018    TRICUSPID VALVE REPAIR  05/2018    MITRAL VALVE REPAIR  05/2018     MV repair, TV repair, MYRNA ligation, MAZE procedure and biatrial  resizing by Dr. Chou at [place], [place].    MITRAL VALVE REPAIR      OTHER      Nasal surgery for nosebleeds       Social History     Socioeconomic History    Marital status:      Spouse name: Not on file    Number of children: Not on file    Years of education: Not on file    Highest education level: Not on file   Occupational History    Not on file   Tobacco Use    Smoking status: Former     Packs/day: 0.00     Types: Cigarettes     Quit date: 1970     Years since quittin.3    Smokeless tobacco: Never   Vaping Use    Vaping Use: Never used   Substance and Sexual Activity    Alcohol use: Yes     Comment: very little    Drug use: Not Currently    Sexual activity: Not on file   Other Topics Concern    Not on file   Social History Narrative    Not on file     Social Determinants of Health     Financial Resource Strain: Not on file   Food Insecurity: Not on file   Transportation Needs: Not on file   Physical Activity: Not on file   Stress: Not on file   Social Connections: Not on file   Intimate Partner Violence: Not on file   Housing Stability: Not on file       Family History   Problem Relation Age of Onset    Cancer Mother     Stroke Father         CVA at 85, afib    Heart Disease Father        No Known Allergies    Current Outpatient Medications   Medication Sig Dispense Refill    levothyroxine (SYNTHROID) 112 MCG Tab Take 112 mcg by mouth every morning on an empty stomach.      dapagliflozin propanediol (FARXIGA) 5 MG Tab Take 1 Tablet by mouth every day. 30 Tablet 3    sotalol (BETAPACE) 80 MG Tab TAKE 1 TABLET BY MOUTH TWICE DAILY 180 Tablet 3    Probiotic Product (PROBIOTIC-10) Chew Tab Chew 1 Tablet every day.      ferrous sulfate 325 (65 Fe) MG tablet Take 1 Tablet by mouth every 48 hours.      magnesium oxide (MAG-OX) 400 MG Tab tablet Take 1 tablet by mouth every day.      potassium chloride (MICRO-K) 10 MEQ capsule Take 10 mEq by mouth every day. 6 days per week, skips        "Multiple Vitamin (MULTI-VITAMINS) Tab Take 1 tablet by mouth every day.      apixaban (ELIQUIS) 5mg Tab Take 5 mg by mouth 2 times a day.       No current facility-administered medications for this visit.       Physical Exam:  Vitals:    04/17/23 0817   BP: 110/72   BP Location: Left arm   Patient Position: Sitting   BP Cuff Size: Adult   Pulse: 76   Resp: 14   SpO2: 98%   Weight: 92.1 kg (203 lb)   Height: 1.905 m (6' 3\")     General appearance: NAD, conversant  HEENT: PERRL, neck is supple with FROM  Lungs: Clear to auscultation, normal respiratory effort  CV: RRR, 2/6 systolic murmur, no JVD  Abdomen: Soft, non-tender with normal bowel sounds  Extremities: No peripheral edema, no clubbing or cyanosis  Skin: No rash, lesions, or ulcers  Psych: Alert and oriented to person, place and time    Data:  Labs reviewed    Prior echo/stress reviewed:  LVEF 25%    EKG interpreted by me:  BiV paced, QT~550 msec    Impression/Plan:  1. Paroxysmal atrial fibrillation (HCC)  EKG      2. Chronic systolic CHF (congestive heart failure), NYHA class 2 (HCC)  EC-ECHOCARDIOGRAM COMPLETE W/ CONT      3. Dilated cardiomyopathy (HCC)        4. AV block, complete (HCC)        5. Nonischemic cardiomyopathy (HCC)        6. Biventricular ICD (implantable cardioverter-defibrillator) in place        7. Encounter for monitoring sotalol therapy          -Device check showing he is in persistent AF that is being under-sensed despite maximum sensitivity  -I do not think atrial pacing is capturing anything  -I also do not think long term prospects for maintenance of sinus rhythm is good  -I will change him to VVIR  -Continue sotalol for now, VT episodes are short, rare, and may have self resolved without ATP    Cristinoing Kayla PAIZ    "

## 2023-04-20 ENCOUNTER — TELEPHONE (OUTPATIENT)
Dept: CARDIOLOGY | Facility: MEDICAL CENTER | Age: 73
End: 2023-04-20

## 2023-04-20 NOTE — TELEPHONE ENCOUNTER
Caller: Pavan Cueva      Topic/issue: Patient was calling and stated the timing of his pacer feels off to him since it was adjusted and he was asking for a call back to discuss it      Callback Number: [phone]        Thank you    -Pantera BARRERA

## 2023-04-21 NOTE — TELEPHONE ENCOUNTER
Caller: Pavan Cueva      Topic/issue: Patient was calling back to follow up from yesterday and he stated he sent a signal from his pacer to us and asked for a call back      Callback Number: [phone]      Thank you    -Pantera BARRERA

## 2023-04-24 NOTE — TELEPHONE ENCOUNTER
Called patient and left VM with patient in regards to pacer. I have asked for patient to give us a call back.

## 2023-04-25 ENCOUNTER — NON-PROVIDER VISIT (OUTPATIENT)
Dept: CARDIOLOGY | Facility: MEDICAL CENTER | Age: 73
End: 2023-04-25
Payer: MEDICARE

## 2023-04-25 PROCEDURE — 99999 PR NO CHARGE: CPT | Performed by: INTERNAL MEDICINE

## 2023-05-05 ENCOUNTER — ANCILLARY PROCEDURE (OUTPATIENT)
Dept: CARDIOLOGY | Facility: MEDICAL CENTER | Age: 73
End: 2023-05-05
Attending: INTERNAL MEDICINE
Payer: MEDICARE

## 2023-05-05 DIAGNOSIS — I50.22 CHRONIC SYSTOLIC CHF (CONGESTIVE HEART FAILURE), NYHA CLASS 2 (HCC): ICD-10-CM

## 2023-05-05 PROCEDURE — 93306 TTE W/DOPPLER COMPLETE: CPT

## 2023-05-10 LAB
LV EJECT FRACT  99904: 25
LV EJECT FRACT MOD 2C 99903: 36.57
LV EJECT FRACT MOD 4C 99902: 27.2
LV EJECT FRACT MOD BP 99901: 33.1

## 2023-05-10 PROCEDURE — 93306 TTE W/DOPPLER COMPLETE: CPT | Mod: 26 | Performed by: INTERNAL MEDICINE

## 2023-05-24 ENCOUNTER — OFFICE VISIT (OUTPATIENT)
Dept: CARDIOLOGY | Facility: MEDICAL CENTER | Age: 73
End: 2023-05-24
Attending: INTERNAL MEDICINE
Payer: MEDICARE

## 2023-05-24 VITALS
DIASTOLIC BLOOD PRESSURE: 62 MMHG | HEIGHT: 75 IN | BODY MASS INDEX: 25.24 KG/M2 | WEIGHT: 203 LBS | RESPIRATION RATE: 16 BRPM | HEART RATE: 78 BPM | OXYGEN SATURATION: 97 % | SYSTOLIC BLOOD PRESSURE: 110 MMHG

## 2023-05-24 DIAGNOSIS — I42.8 NONISCHEMIC CARDIOMYOPATHY (HCC): ICD-10-CM

## 2023-05-24 DIAGNOSIS — Z95.3 S/P MITRAL VALVE REPLACEMENT WITH BIOPROSTHETIC VALVE: ICD-10-CM

## 2023-05-24 DIAGNOSIS — Z79.01 CHRONIC ANTICOAGULATION: ICD-10-CM

## 2023-05-24 DIAGNOSIS — I50.22 CHRONIC HFREF (HEART FAILURE WITH REDUCED EJECTION FRACTION) (HCC): ICD-10-CM

## 2023-05-24 DIAGNOSIS — I48.0 PAROXYSMAL ATRIAL FIBRILLATION (HCC): ICD-10-CM

## 2023-05-24 DIAGNOSIS — Z95.810 BIVENTRICULAR ICD (IMPLANTABLE CARDIOVERTER-DEFIBRILLATOR) IN PLACE: ICD-10-CM

## 2023-05-24 DIAGNOSIS — D68.69 SECONDARY HYPERCOAGULABLE STATE (HCC): ICD-10-CM

## 2023-05-24 PROBLEM — I44.2 AV BLOCK, COMPLETE (HCC): Status: RESOLVED | Noted: 2021-10-27 | Resolved: 2023-05-24

## 2023-05-24 PROBLEM — Z79.899 ON AMIODARONE THERAPY: Status: RESOLVED | Noted: 2022-05-05 | Resolved: 2023-05-24

## 2023-05-24 PROBLEM — I46.9 CARDIAC ARREST (HCC): Status: RESOLVED | Noted: 2022-01-05 | Resolved: 2023-05-24

## 2023-05-24 PROCEDURE — 99214 OFFICE O/P EST MOD 30 MIN: CPT | Performed by: INTERNAL MEDICINE

## 2023-05-24 PROCEDURE — 99212 OFFICE O/P EST SF 10 MIN: CPT | Performed by: INTERNAL MEDICINE

## 2023-05-24 PROCEDURE — 3078F DIAST BP <80 MM HG: CPT | Performed by: INTERNAL MEDICINE

## 2023-05-24 PROCEDURE — 3074F SYST BP LT 130 MM HG: CPT | Performed by: INTERNAL MEDICINE

## 2023-05-24 ASSESSMENT — ENCOUNTER SYMPTOMS
LOSS OF CONSCIOUSNESS: 0
DIZZINESS: 0
SHORTNESS OF BREATH: 0
PALPITATIONS: 0
MYALGIAS: 0
COUGH: 0

## 2023-05-24 ASSESSMENT — FIBROSIS 4 INDEX: FIB4 SCORE: 1.770506672551962005

## 2023-05-24 NOTE — PROGRESS NOTES
Chief Complaint   Patient presents with    Atrial Fibrillation    Hypertension       Subjective     Pavan Cueva is a 73 y.o. male who presents today for follow-up cardiac care.    The patient has significant past medical history of S/P mitral valve repair, tricuspid valve repair, MAZE, MYRNA ligation, PPM 7/13/2018 ([place], [place]), PAF, redo S/P mitral valve replacement (33 mm Angeli Underwood Theon pericardial valve) 9/8/2021 ([facility]), cardiac arrest 12/20/2021 ([facility]), CRT-D 12/22/2021 ([facility]), nonischemic cardiomyopathy, HFrEF 25%, amiodarone therapy, hypothyroidism (amiodarone induced), anticoagulation, hypertension, ARIANA.    Since 11/21/2022 appointment the patient patient was seen in follow-up by EP Dr. Garza on 4/17/2023.  He has had no cardiac symptoms including chest pain, palpitations, shortness of breath.  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  He is very active outdoors.  He raises chickens.  He does various projects and currently is restoring some outdoor furniture.  He carefully monitors his blood pressure and consistently is running on average 96/53 with heart rates in the 60s.  He had a follow-up echocardiogram which continues to show LVEF 25%.  He is exceedingly anxious about his heart condition and gets concerned about various intermittent symptoms to the point that he considered moving within a block of [facility].      Past Medical History:   Diagnosis Date    AF (atrial fibrillation) ([facility])          Anemia     AV block, complete ([facility])     Cardiomyopathy, dilated ([facility]) 03/2022    Echocardiogram with severely dilated LV, LVEF 20-25%. Global hypokinesis. Normal RV. Severely dilated LA and RV. MV prothesis with elevated transvalvular gradient. Trace AR, mild TR. RVSP 47mmHg.    Chronic anticoagulation     Heart valve disease     Hemorrhagic disorder ([facility])     History of epistaxis    Hypertension     ARIANA  (obstructive sleep apnea)     Renal mass     Restrictive lung disease     Sleep apnea     no cpap, 3L O2    Thyroid disease      Past Surgical History:   Procedure Laterality Date    MITRAL VALVE REPLACEMENT  2021    Redo MVR (#33 Angeli-Underwood Theon pericardial valve) by Dr. Chou, [place], [place]    PACEMAKER INSERTION Left 2018    Medtronic Dionne XT DR DAVIN [id number] [facility] [place] [place] [place]    MAZE PROCEDURE  2018    TRICUSPID VALVE REPAIR  2018    MITRAL VALVE REPAIR  2018     MV repair, TV repair, MYRNA ligation, MAZE procedure and biatrial resizing by Dr. Chou at [place], [place].    MITRAL VALVE REPAIR      OTHER      Nasal surgery for nosebleeds     Family History   Problem Relation Age of Onset    Cancer Mother     Stroke Father         CVA at 85, afib    Heart Disease Father      Social History     Socioeconomic History    Marital status:      Spouse name: Not on file    Number of children: Not on file    Years of education: Not on file    Highest education level: Not on file   Occupational History    Not on file   Tobacco Use    Smoking status: Former     Packs/day: 0.00     Types: Cigarettes     Quit date: 1970     Years since quittin.4    Smokeless tobacco: Never   Vaping Use    Vaping Use: Never used   Substance and Sexual Activity    Alcohol use: Not Currently     Comment: very little    Drug use: Not Currently    Sexual activity: Not on file   Other Topics Concern    Not on file   Social History Narrative    Not on file     Social Determinants of Health     Financial Resource Strain: Not on file   Food Insecurity: Not on file   Transportation Needs: Not on file   Physical Activity: Not on file   Stress: Not on file   Social Connections: Not on file   Intimate Partner Violence: Not on file   Housing Stability: Not on file     No Known Allergies  Outpatient Encounter Medications as of 2023   Medication Sig Dispense Refill     "levothyroxine (SYNTHROID) 112 MCG Tab Take 112 mcg by mouth every morning on an empty stomach.      dapagliflozin propanediol (FARXIGA) 5 MG Tab Take 1 Tablet by mouth every day. 30 Tablet 3    sotalol (BETAPACE) 80 MG Tab TAKE 1 TABLET BY MOUTH TWICE DAILY 180 Tablet 3    Probiotic Product (PROBIOTIC-10) Chew Tab Chew 1 Tablet every day.      ferrous sulfate 325 (65 Fe) MG tablet Take 1 Tablet by mouth every 48 hours.      magnesium oxide (MAG-OX) 400 MG Tab tablet Take 1 tablet by mouth every day.      potassium chloride (MICRO-K) 10 MEQ capsule Take 10 mEq by mouth every day. 6 days per week, skips sunday      Multiple Vitamin (MULTI-VITAMINS) Tab Take 1 tablet by mouth every day.      apixaban (ELIQUIS) 5mg Tab Take 5 mg by mouth 2 times a day.       No facility-administered encounter medications on file as of 5/24/2023.     Review of Systems   Respiratory:  Negative for cough and shortness of breath.    Cardiovascular:  Negative for chest pain and palpitations.   Musculoskeletal:  Negative for myalgias.   Neurological:  Negative for dizziness and loss of consciousness.           /62 (BP Location: Left arm, Patient Position: Sitting, BP Cuff Size: Adult)   Pulse 78   Resp 16   Ht 1.905 m (6' 3\")   Wt 92.1 kg (203 lb)   SpO2 97%   BMI 25.37 kg/m²     Physical Exam  Vitals reviewed.   Constitutional:       General: He is not in acute distress.     Appearance: He is well-developed.   Eyes:      Conjunctiva/sclera: Conjunctivae normal.      Pupils: Pupils are equal, round, and reactive to light.   Neck:      Vascular: No JVD.   Cardiovascular:      Rate and Rhythm: Normal rate and regular rhythm.      Pulses: Normal pulses.           Carotid pulses are 2+ on the right side and 2+ on the left side.     Heart sounds: Murmur heard.      No friction rub. No gallop.      Comments: Midline scar  Generator left subclavian area  Pulmonary:      Effort: Pulmonary effort is normal. No accessory muscle usage or " respiratory distress.      Breath sounds: Normal breath sounds. No wheezing or rales.   Musculoskeletal:      Cervical back: Normal range of motion and neck supple.      Right lower leg: No edema.      Left lower leg: No edema.   Skin:     General: Skin is warm and dry.      Findings: No rash.      Nails: There is no clubbing.   Neurological:      Mental Status: He is alert and oriented to person, place, and time.   Psychiatric:         Behavior: Behavior normal.              ECHOCARDIOGRAM 3/18/2022  Severely reduced left ventricular systolic function.   The left ventricular ejection fraction is visually estimated to be 20-25%.   Grade III diastolic dysfunction (restrictive pattern).  Known mitral valve bioprosthesis with elevated transvalvular gradient.  Estimated right ventricular systolic pressure is 47 mmHg; mild pulmonary hypertension.   No pericardial effusion.    EKG 5/5/2021 AV paced rate 61 personally interpreted.    EKG 11/17/2022 AV pacing, personally reviewed    Assessment & Plan     1. Chronic HFrEF (heart failure with reduced ejection fraction) (HCC)        2. Nonischemic cardiomyopathy (HCC)        3. S/P mitral valve replacement with bioprosthetic valve        4. Paroxysmal atrial fibrillation (HCC)        5. Secondary hypercoagulable state (HCC)        6. Chronic anticoagulation        7. Biventricular ICD (implantable cardioverter-defibrillator) in place            Medical Decision Making: Today's Assessment/Status/Plan:   Assessment  HFrEF 25%.  Nonischemic cardiomyopathy  S/P MVR (33 mm Angeli Underwood Theon pericardial valve) 9/8/2021 ([facility] [place], [place]).  S/P mitral valve repair, tricuspid valve repair, MAZE, MYRNA ligation, PPM 7/13/2018 ([facility] [place])  CRT-D 12/22/2021  PAF  Amiodarone therapy, started 12/2021, stopped 10/2022; sotalol started  Anticoagulation, apixaban.  Hypothyroidism amiodarone induced, followed by Dr. Mackay endocrinologist [place].  Hypertension.  ARIANA    Recommendation Discussion  HFrEF 25%, clinically stable, highly functional, NYHA class I, for GDMT only on dapagliflozin; attempts at ACE inhibitor and metoprolol were unsuccessful due to symptomatic low BP.  PAF, AV pacing, now on sotalol, apixaban  Anticoagulation, on apixaban, normal renal function, continue 5 mg twice daily  MVR, functioning normally  CRT-D A paced 99%, V paced 98%, no mode switching  RTC 6 months.

## 2023-06-16 LAB — EKG IMPRESSION: NORMAL

## 2023-06-22 ENCOUNTER — NON-PROVIDER VISIT (OUTPATIENT)
Dept: CARDIOLOGY | Facility: MEDICAL CENTER | Age: 73
End: 2023-06-22
Attending: INTERNAL MEDICINE
Payer: MEDICARE

## 2023-06-22 VITALS
BODY MASS INDEX: 25.75 KG/M2 | WEIGHT: 206 LBS | HEART RATE: 60 BPM | DIASTOLIC BLOOD PRESSURE: 66 MMHG | SYSTOLIC BLOOD PRESSURE: 119 MMHG

## 2023-06-22 PROCEDURE — 99212 OFFICE O/P EST SF 10 MIN: CPT

## 2023-06-22 ASSESSMENT — FIBROSIS 4 INDEX: FIB4 SCORE: 1.770506672551962005

## 2023-06-22 NOTE — PROGRESS NOTES
CHF Pharmacotherapy visit - Visit    Date of Service: 23    Informed written consent was given on: 22    Pavan Cueva is here for CHF     HPI  Pertinent Interval History since last visit:   Last saw Dr Romero on 23, no changes to medications  Patient got new BP machine  No syncopal episodes since last visit    Most recent EF:  25% (22) repeated 25% on 23      Current Outpatient Medications:     levothyroxine, 112 mcg, Oral, AM ES, Taking    dapagliflozin propanediol, 5 mg, Oral, DAILY, Taking    sotalol, TAKE 1 TABLET BY MOUTH TWICE DAILY, Taking    Probiotic-10, 1 Tablet, Oral, DAILY, Taking    ferrous sulfate, 1 Tablet, Oral, Q48HRS, Taking    magnesium oxide, 1 Tablet, Oral, DAILY, Taking    potassium chloride, 10 mEq, Oral, DAILY, Taking    Multi-Vitamins, 1 Tablet, Oral, DAILY, Taking    apixaban, 5 mg, Oral, BID, Taking    Current Adherence to CHF Therapies:  Complete     Current CHF Medications - including dose:   Entresto or ACE/ARB: none (has enalapril 2.5 mg on hand)  Beta blocker:   furosemide 20 mg PRN if weight gain of more than 3 lbs in a day - has not needed  Aldosterone antagonist: none  SGLT2i: Farxiga 5 mg daily    Home BP and HR:   /75, 126/67, 115/68, 107/68  HR: 75, 72, 80, 78, 84, 83, 78    Change in weight: Stable     Exercise habits:  Does yardwork/chores around the house - stays active with chickens    Diet: common adult     SOCIAL HISTORY  Social History     Tobacco Use   Smoking Status Former    Packs/day: 0.00    Types: Cigarettes    Quit date: 1970    Years since quittin.5   Smokeless Tobacco Never   Vaping Use    Vaping Use: Never used        DATA REVIEW  Vitals:    23 1034   BP: 119/66   Pulse: 60       No results found for: HBA1C       Lab Results   Component Value Date/Time    CHOLSTRLTOT 165 2022 04:47 AM    CHOLSTRLTOT 149 2021 08:39 AM    LDL 80 2021 08:39 AM    HDL 59 2022 04:47 AM    HDL 63 2021  08:39 AM    TRIGLYCERIDE 59 03/01/2022 04:47 AM    TRIGLYCERIDE 28 06/01/2021 08:39 AM       Lab Results   Component Value Date/Time    SODIUM 139 05/02/2023 12:12 PM    SODIUM 140 03/01/2022 04:47 AM    SODIUM 137 06/01/2021 08:39 AM    POTASSIUM 4.3 05/02/2023 12:12 PM    POTASSIUM 4.6 03/01/2022 04:47 AM    POTASSIUM 4.7 06/01/2021 08:39 AM    CHLORIDE 107 05/02/2023 12:12 PM    CHLORIDE 100 03/01/2022 04:47 AM    CHLORIDE 102 06/01/2021 08:39 AM    CO2 27 05/02/2023 12:12 PM    CO2 28 03/01/2022 04:47 AM    CO2 28 06/01/2021 08:39 AM    GLUCOSE 74 05/02/2023 12:12 PM    GLUCOSE 95 03/01/2022 04:47 AM    GLUCOSE 91 06/01/2021 08:39 AM    BUN 20 05/02/2023 12:12 PM    BUN 20 03/01/2022 04:47 AM    BUN 22 06/01/2021 08:39 AM    CREATININE 0.96 05/02/2023 12:12 PM    CREATININE 1.13 03/01/2022 04:47 AM    CREATININE 0.85 06/01/2021 08:39 AM    BUNCREATRAT 18 03/01/2022 04:47 AM    GLOMRATE 77 05/02/2023 12:12 PM     Lab Results   Component Value Date/Time    ALKPHOSPHAT 62 05/02/2023 12:12 PM    ALKPHOSPHAT 59 06/01/2021 08:39 AM    ASTSGOT 20 05/02/2023 12:12 PM    ASTSGOT 22 06/01/2021 08:39 AM    ALTSGPT 15 (L) 05/02/2023 12:12 PM    ALTSGPT 20 06/01/2021 08:39 AM    TBILIRUBIN 0.8 05/02/2023 12:12 PM    TBILIRUBIN 1.0 06/01/2021 08:39 AM    INR 1.17 (H) 06/03/2021 11:57 AM    ALBUMIN 4.1 05/02/2023 12:12 PM    ALBUMIN 4.3 06/01/2021 08:39 AM      No components found for: MICROALBUMINCREATRATIOURINE    Renal function:  Calculated creatinine clearance: ~90 ml/min     Other Pertinent Blood Work: None    Other:  Immunization History   Administered Date(s) Administered    Influenza Vac Subunit Quad Inj (Pf) 11/26/2018    Influenza Vaccine Adult HD 11/14/2019    Influenza Vaccine Quad Inj (Pf) 10/08/2020, 10/08/2020    Influenza, Unspecified - HISTORICAL DATA 11/14/2019    PFIZER PURPLE CAP SARS-COV-2 VACCINATION (12+) 07/30/2021, 08/21/2021    Pneumococcal Conjugate Vaccine (Prevnar/PCV-13) 09/11/2019    Tdap Vaccine  07/30/2010     Up to date on pneumococcal vaccine? Due for PCV20, patient given instructions to receive at PCP or pharmacy    Recent Imaging Studies:    Echo on 5/5/23 - discussed with Heather      ASSESSMENT AND PLAN    Patient has not had further dizzy spells since last visit - BP increased and within normal range since last time. Possible that amiodarone was still affecting BP due to it's long half life.   Patient previously did not tolerate HF GDMT due to symptomatic hypotension. If patient continues to be without symptomatic hypotension may consider low dose of enalapril at next visit.   No changes to medications during this visit    Resulting CHF medications today (changes are bolded)  Entresto or ACE/ARB: None  Beta blocker: Sotalol 80 mg BID  Diuretic: furosemide 20 mg PRN   SGLT2i: Farxiga 5 mg daily    Lifestyle Recommendations From Today's Visit:   Continue to limit fluid intake to 2-3L/day and Na+ intake to 2gm/day    Significant changes to laboratory values since last visit that require repeat labs:  N/a    Blood Work Ordered At Today's visit:   None    Studies Ordered at Todays Visit:  None     Follow-Up:   1 month Dr Garza, 2 months pharmacotherapy    Kimberly Alston, PharmD    CC: Dr Romero

## 2023-07-05 ENCOUNTER — NON-PROVIDER VISIT (OUTPATIENT)
Dept: CARDIOLOGY | Facility: MEDICAL CENTER | Age: 73
End: 2023-07-05
Payer: MEDICARE

## 2023-07-05 PROCEDURE — 93295 DEV INTERROG REMOTE 1/2/MLT: CPT | Performed by: INTERNAL MEDICINE

## 2023-07-05 NOTE — CARDIAC REMOTE MONITOR - SCAN
Device transmission reviewed. Device demonstrated appropriate function.       Electronically Signed by: Jesus Garza M.D.    7/19/2023  3:56 PM

## 2023-07-17 ENCOUNTER — TELEPHONE (OUTPATIENT)
Dept: CARDIOLOGY | Facility: MEDICAL CENTER | Age: 73
End: 2023-07-17
Payer: MEDICARE

## 2023-07-17 NOTE — TELEPHONE ENCOUNTER
Cardiac clearance faxed to [facility] [phone].    Phone Number Called: [phone]    Call outcome: Unable to LVM    Message:     Attempted to call pt back regarding his clearance. Unable to LVM as voice mail box is full. Will send MyChart msg to pt.

## 2023-07-17 NOTE — TELEPHONE ENCOUNTER
Last OV: 05/24/2023  Proposed Surgery: Oral surgery  Surgery Date: 07/18/2023  Requesting Office Name: [facility]  Fax Number: [phone]  Preference of Location (default is surgery center unless specified by Cardiologist or ROSA)  Prior Clearance Addressed: No      Anticoags/Antiplatelets: Apixaban   Outstanding Cardiac Imaging : No  Stent, Cardiac Devices, or Catheterization: Yes  Date : 12/22/2021   Ablation, TAVR/Valve (including open heart), Cardioversion: No  Recent Cardiac Hospitalization: No            When: N/A  History (cardiac history):   Past Medical History:   Diagnosis Date    AF (atrial fibrillation) ([facility])          Anemia     AV block, complete ([facility])     Cardiomyopathy, dilated ([facility]) 03/2022    Echocardiogram with severely dilated LV, LVEF 20-25%. Global hypokinesis. Normal RV. Severely dilated LA and RV. MV prothesis with elevated transvalvular gradient. Trace AR, mild TR. RVSP 47mmHg.    Chronic anticoagulation     Heart valve disease     Hemorrhagic disorder (HCC)     History of epistaxis    Hypertension     ARIANA (obstructive sleep apnea)     Renal mass     Restrictive lung disease     Sleep apnea     no cpap, 3L O2    Thyroid disease              Surgical Clearance Letter Sent: YES   **Scan clearance request letter into [facility].**

## 2023-07-17 NOTE — TELEPHONE ENCOUNTER
MATTHEW  Caller: Pavan Cueva    Topic/issue: Above PT calling for a call back in regards to no longer taking his Eliquis RX due to a minor oral surgery scheduled for 07/18/23 at 7 am. at [facility]. PT mentioned he hasn't taken it yesterday or today. PT is saying surgeon is requesting a clearance.    Fax [phone]    Callback Number: [phone]    Thank You   Helen ARECHIGA

## 2023-07-19 ENCOUNTER — OFFICE VISIT (OUTPATIENT)
Dept: CARDIOLOGY | Facility: MEDICAL CENTER | Age: 73
End: 2023-07-19
Attending: INTERNAL MEDICINE
Payer: MEDICARE

## 2023-07-19 VITALS
HEIGHT: 75 IN | DIASTOLIC BLOOD PRESSURE: 64 MMHG | HEART RATE: 65 BPM | BODY MASS INDEX: 25.12 KG/M2 | WEIGHT: 202 LBS | OXYGEN SATURATION: 99 % | RESPIRATION RATE: 16 BRPM | SYSTOLIC BLOOD PRESSURE: 110 MMHG

## 2023-07-19 DIAGNOSIS — Z79.899 ENCOUNTER FOR MONITORING SOTALOL THERAPY: ICD-10-CM

## 2023-07-19 DIAGNOSIS — I50.22 CHRONIC HFREF (HEART FAILURE WITH REDUCED EJECTION FRACTION) (HCC): ICD-10-CM

## 2023-07-19 DIAGNOSIS — I48.21 PERMANENT ATRIAL FIBRILLATION (HCC): ICD-10-CM

## 2023-07-19 DIAGNOSIS — I42.8 NONISCHEMIC CARDIOMYOPATHY (HCC): ICD-10-CM

## 2023-07-19 DIAGNOSIS — I42.0 DILATED CARDIOMYOPATHY (HCC): ICD-10-CM

## 2023-07-19 DIAGNOSIS — Z51.81 ENCOUNTER FOR MONITORING SOTALOL THERAPY: ICD-10-CM

## 2023-07-19 DIAGNOSIS — I50.22 CHRONIC SYSTOLIC CHF (CONGESTIVE HEART FAILURE), NYHA CLASS 2 (HCC): ICD-10-CM

## 2023-07-19 DIAGNOSIS — Z95.810 BIVENTRICULAR ICD (IMPLANTABLE CARDIOVERTER-DEFIBRILLATOR) IN PLACE: ICD-10-CM

## 2023-07-19 PROCEDURE — 3074F SYST BP LT 130 MM HG: CPT | Performed by: INTERNAL MEDICINE

## 2023-07-19 PROCEDURE — 93284 PRGRMG EVAL IMPLANTABLE DFB: CPT | Mod: 26 | Performed by: INTERNAL MEDICINE

## 2023-07-19 PROCEDURE — 93010 ELECTROCARDIOGRAM REPORT: CPT | Mod: 59 | Performed by: INTERNAL MEDICINE

## 2023-07-19 PROCEDURE — 99214 OFFICE O/P EST MOD 30 MIN: CPT | Mod: 25 | Performed by: INTERNAL MEDICINE

## 2023-07-19 PROCEDURE — 3078F DIAST BP <80 MM HG: CPT | Performed by: INTERNAL MEDICINE

## 2023-07-19 PROCEDURE — 93005 ELECTROCARDIOGRAM TRACING: CPT | Performed by: INTERNAL MEDICINE

## 2023-07-19 PROCEDURE — 99212 OFFICE O/P EST SF 10 MIN: CPT | Performed by: INTERNAL MEDICINE

## 2023-07-19 PROCEDURE — 93284 PRGRMG EVAL IMPLANTABLE DFB: CPT | Performed by: INTERNAL MEDICINE

## 2023-07-19 RX ORDER — SACUBITRIL AND VALSARTAN 24; 26 MG/1; MG/1
1 TABLET, FILM COATED ORAL 2 TIMES DAILY
Qty: 60 TABLET | Refills: 3 | Status: SHIPPED | OUTPATIENT
Start: 2023-07-19 | End: 2023-08-25

## 2023-07-19 ASSESSMENT — FIBROSIS 4 INDEX: FIB4 SCORE: 1.770506672551962005

## 2023-07-19 NOTE — PROGRESS NOTES
Arrhythmia Clinic Note (Established patient)    DOS: 7/19/2023    Chief complaint/Reason for consult: F/u AF/CRT-D    Interval History:  Pt is a 74 yo M. History of permanent AF. S/p prior MVR/TVR. S/p CRT-D initially implanted in [place]. CHB. NICM with LV systolic function 20-25% EF. Stable NYHA II symptoms. No compalints to me today.    ROS (+ highlighted in red):  General--Negative for fatigue, weight loss or weight gain  Cardiovascular--Negative for CP, orthopnea, PND    Past Medical History:   Diagnosis Date    AF (atrial fibrillation) ([facility])          Anemia     AV block, complete ([facility])     Cardiomyopathy, dilated ([facility]) 03/2022    Echocardiogram with severely dilated LV, LVEF 20-25%. Global hypokinesis. Normal RV. Severely dilated LA and RV. MV prothesis with elevated transvalvular gradient. Trace AR, mild TR. RVSP 47mmHg.    Chronic anticoagulation     Heart valve disease     Hemorrhagic disorder ([facility])     History of epistaxis    Hypertension     ARIANA (obstructive sleep apnea)     Renal mass     Restrictive lung disease     Sleep apnea     no cpap, 3L O2    Thyroid disease        Past Surgical History:   Procedure Laterality Date    MITRAL VALVE REPLACEMENT  09/08/2021    Redo MVR (#33 Angeli-Underwood Theon pericardial valve) by Dr. Chou, [place], [place]    PACEMAKER INSERTION Left 07/13/2018    Medtronic Dionne XT DR JIN [id number] implanted by Dr. Gillette, [place], [place]    MAZE PROCEDURE  05/2018    TRICUSPID VALVE REPAIR  05/2018    MITRAL VALVE REPAIR  05/2018     MV repair, TV repair, MYRNA ligation, MAZE procedure and biatrial resizing by Dr. Chou at [place], [place].    MITRAL VALVE REPAIR      OTHER      Nasal surgery for nosebleeds       Social History     Socioeconomic History    Marital status:      Spouse name: Not on file    Number of children: Not on file    Years of education: Not on file    Highest education level: Not on file   Occupational History    Not on file    Tobacco Use    Smoking status: Former     Packs/day: 0.00     Types: Cigarettes     Quit date: 1970     Years since quittin.5    Smokeless tobacco: Never   Vaping Use    Vaping Use: Never used   Substance and Sexual Activity    Alcohol use: Not Currently    Drug use: Not Currently    Sexual activity: Not on file   Other Topics Concern    Not on file   Social History Narrative    Not on file     Social Determinants of Health     Financial Resource Strain: Not on file   Food Insecurity: Not on file   Transportation Needs: Not on file   Physical Activity: Not on file   Stress: Not on file   Social Connections: Not on file   Intimate Partner Violence: Not on file   Housing Stability: Not on file       Family History   Problem Relation Age of Onset    Cancer Mother     Stroke Father         CVA at 85, afib    Heart Disease Father        No Known Allergies    Current Outpatient Medications   Medication Sig Dispense Refill    levothyroxine (SYNTHROID) 112 MCG Tab Take 112 mcg by mouth every morning on an empty stomach.      dapagliflozin propanediol (FARXIGA) 5 MG Tab Take 1 Tablet by mouth every day. 30 Tablet 3    sotalol (BETAPACE) 80 MG Tab TAKE 1 TABLET BY MOUTH TWICE DAILY 180 Tablet 3    Probiotic Product (PROBIOTIC-10) Chew Tab Chew 1 Tablet every day.      ferrous sulfate 325 (65 Fe) MG tablet Take 1 Tablet by mouth every 48 hours.      magnesium oxide (MAG-OX) 400 MG Tab tablet Take 1 tablet by mouth every day.      potassium chloride (MICRO-K) 10 MEQ capsule Take 10 mEq by mouth every day. 6 days per week, skips       Multiple Vitamin (MULTI-VITAMINS) Tab Take 1 tablet by mouth every day.      apixaban (ELIQUIS) 5mg Tab Take 5 mg by mouth 2 times a day.       No current facility-administered medications for this visit.       Physical Exam:  Vitals:    23 0811   BP: 110/64   BP Location: Left arm   Patient Position: Sitting   BP Cuff Size: Adult   Pulse: 65   Resp: 16   SpO2: 99%   Weight: 91.6  "kg (202 lb)   Height: 1.905 m (6' 3\")     General appearance: NAD, conversant  HEENT: PERRL, neck is supple with FROM  Lungs: Clear to auscultation, normal respiratory effort  CV: RRR, no murmurs/rubs/gallops, no JVD  Abdomen: Soft, non-tender with normal bowel sounds  Extremities: No peripheral edema, no clubbing or cyanosis  Skin: No rash, lesions, or ulcers  Psych: Alert and oriented to person, place and time    Data:  Labs reviewed:    Prior echo/stress reviewed:  LVEF 25%    Prior cath reviewed:    EKG interpreted by me:  BiV paced    Impression/Plan:  1. Permanent atrial fibrillation (HCC)  EKG      2. Chronic systolic CHF (congestive heart failure), NYHA class 2 ([facility])  sacubitril-valsartan (ENTRESTO) 24-26 MG Tab    REFERRAL TO CARDIOLOGY      3. Chronic HFrEF (heart failure with reduced ejection fraction) ([facility])        4. Encounter for monitoring sotalol therapy        5. Nonischemic cardiomyopathy (HCC)          -ICD working appropriately  -No events since last check on the sotalol  -LV function has remained poor  -I will add low dose entresto to his regimen  -F/u with HF clinic to titrate optimize GDMT for HF    Jesus Garza MD    "

## 2023-08-25 ENCOUNTER — OFFICE VISIT (OUTPATIENT)
Dept: CARDIOLOGY | Facility: MEDICAL CENTER | Age: 73
End: 2023-08-25
Attending: NURSE PRACTITIONER
Payer: MEDICARE

## 2023-08-25 VITALS
HEART RATE: 78 BPM | OXYGEN SATURATION: 98 % | WEIGHT: 202 LBS | BODY MASS INDEX: 25.12 KG/M2 | HEIGHT: 75 IN | SYSTOLIC BLOOD PRESSURE: 110 MMHG | DIASTOLIC BLOOD PRESSURE: 69 MMHG | RESPIRATION RATE: 14 BRPM

## 2023-08-25 DIAGNOSIS — I50.22 CHRONIC HFREF (HEART FAILURE WITH REDUCED EJECTION FRACTION) (HCC): ICD-10-CM

## 2023-08-25 DIAGNOSIS — I48.0 PAROXYSMAL ATRIAL FIBRILLATION (HCC): ICD-10-CM

## 2023-08-25 DIAGNOSIS — Z95.810 BIVENTRICULAR ICD (IMPLANTABLE CARDIOVERTER-DEFIBRILLATOR) IN PLACE: ICD-10-CM

## 2023-08-25 DIAGNOSIS — Z79.01 CHRONIC ANTICOAGULATION: ICD-10-CM

## 2023-08-25 DIAGNOSIS — R06.09 OTHER FORMS OF DYSPNEA: ICD-10-CM

## 2023-08-25 DIAGNOSIS — Z95.3 S/P MITRAL VALVE REPLACEMENT WITH BIOPROSTHETIC VALVE: ICD-10-CM

## 2023-08-25 DIAGNOSIS — Z79.899 ENCOUNTER FOR MONITORING SOTALOL THERAPY: ICD-10-CM

## 2023-08-25 DIAGNOSIS — I42.5 RESTRICTIVE CARDIOMYOPATHY (HCC): ICD-10-CM

## 2023-08-25 DIAGNOSIS — I42.8 NONISCHEMIC CARDIOMYOPATHY (HCC): ICD-10-CM

## 2023-08-25 DIAGNOSIS — I42.0 DILATED CARDIOMYOPATHY (HCC): ICD-10-CM

## 2023-08-25 DIAGNOSIS — D68.69 SECONDARY HYPERCOAGULABLE STATE (HCC): ICD-10-CM

## 2023-08-25 DIAGNOSIS — I50.20 NYHA CLASS 2 HEART FAILURE WITH REDUCED EJECTION FRACTION (HCC): ICD-10-CM

## 2023-08-25 DIAGNOSIS — Z51.81 ENCOUNTER FOR MONITORING SOTALOL THERAPY: ICD-10-CM

## 2023-08-25 PROCEDURE — 3074F SYST BP LT 130 MM HG: CPT | Performed by: NURSE PRACTITIONER

## 2023-08-25 PROCEDURE — 3078F DIAST BP <80 MM HG: CPT | Performed by: NURSE PRACTITIONER

## 2023-08-25 PROCEDURE — 99214 OFFICE O/P EST MOD 30 MIN: CPT | Performed by: NURSE PRACTITIONER

## 2023-08-25 PROCEDURE — 99215 OFFICE O/P EST HI 40 MIN: CPT | Performed by: NURSE PRACTITIONER

## 2023-08-25 ASSESSMENT — FIBROSIS 4 INDEX: FIB4 SCORE: 1.770506672551962005

## 2023-08-25 ASSESSMENT — MINNESOTA LIVING WITH HEART FAILURE QUESTIONNAIRE (MLHF)
LOSS OF SELF CONTROL IN YOUR LIFE: 1
EATING LESS FOODS YOU LIKE: 0
DIFFICULTY GOING AWAY FROM HOME: 0
GIVING YOU SIDE EFFECTS FROM TREATMENTS: 3
WALKING ABOUT OR CLIMBING STAIRS DIFFICULT: 1
DIFFICULTY WITH SEXUAL ACTIVITIES: 4
FEELING LIKE A BURDEN TO FAMILY AND FRIENDS: 1
TOTAL_SCORE: 24
MAKING YOU SHORT OF BREATH: 1
DIFFICULTY WORKING TO EARN A LIVING: 0
WORKING AROUND THE HOUSE OR YARD DIFFICULT: 1
MAKING YOU STAY IN A HOSPITAL: 0
DIFFICULTY SLEEPING WELL AT NIGHT: 3
HAVING TO SIT OR LIE DOWN DURING THE DAY: 1
MAKING YOU FEEL DEPRESSED: 0
DIFFICULTY WITH RECREATIONAL PASTIMES, SPORTS, HOBBIES: 1
DIFFICULTY SOCIALIZING WITH FAMILY OR FRIENDS: 1
SWELLING IN ANKLES OR LEGS: 0
MAKING YOU WORRY: 2
DIFFICULTY TO CONCENTRATE OR REMEMBERING THINGS: 1
TIRED, FATIGUED OR LOW ON ENERGY: 1
COSTING YOU MONEY FOR MEDICAL CARE: 2

## 2023-08-25 ASSESSMENT — 6 MINUTE WALK TEST (6MWT): TOTAL DISTANCE WALKED (METERS): 347

## 2023-08-25 NOTE — PROGRESS NOTES
Chief Complaint   Patient presents with    Atrial Fibrillation     NP Dx: Paroxysmal atrial fibrillation (HCC)      New Patient     Dx: Chronic HFrEF (heart failure with reduced ejection fraction) (HCC)    Cardiomyopathy (Non-ischemic)       Subjective     Pavan Cueva is a 73 y.o. male who presents today as heart failure new complex cardiovascular history as status post mitral valve repair, tricuspid repair, maze, MYRNA ligation, PPM (7/13/2018, [facility], paroxysmal A-fib, redo mitral valve replacement (2021, [facility]) cardiac arrest status post CRT-D (12/20/2021, [facility]), hypertension.  Patient is additional medical problems of hypothyroid, ARIANA.  Patient established with Dr. Garza was last seen 7/19/2023 and by Dr. Romero on 5/24/2023.  He has been following with pharmacotherapy clinic.    8/25/2023: Initial 6 minute walk test, patient was able to complete 347 m during 6 minute walk test. his O2 saturation at baseline was 98% and at the end of the test, the O2 saturation was 97%. He reported level 0 of dyspnea on Harshal scale.  Patient was able to walk for 6 minutes.    MLWHF score 24    Today, but started Entresto as he struggled with escalating his guideline directed medical therapy due to dizziness and hypotension.  Extensive discussion regarding patient's complex history no ischemic work-up was performed during cardiac arrest in 21 as he felt it was related more towards scar tissue from open heart surgery.  Patient reports he is doing well on minimal guideline directed therapy where he is able to participate minimally in volunteer work with NYHA class II functional capacity.  Patient reports maintained euvolemia with dry weight at 200 pounds.  Extensive chart reviewed with patient, reviewed Dr. Gutierrez's discussion for consideration of amyloid, but work-up was not completed    In office today patient appears euvolemic on exam.  Reviewed  most recent echocardiogram per below.  We will hold optimizing GDMT at this time due to poor tolerance in the past, but will evaluate for infiltrative cardiomyopathy and rule out ischemic cardiomyopathy before retrialing guideline directed medical therapy escalation.  We briefly discussed advanced therapies, patient would be agreeable to referral if needed.  Patient to follow with heart failure clinic in 2 months.      Past Medical History:   Diagnosis Date    AF (atrial fibrillation) ([facility])          Anemia     AV block, complete (HCC)     Cardiomyopathy, dilated ([facility]) 03/2022    Echocardiogram with severely dilated LV, LVEF 20-25%. Global hypokinesis. Normal RV. Severely dilated LA and RV. MV prothesis with elevated transvalvular gradient. Trace AR, mild TR. RVSP 47mmHg.    Chronic anticoagulation     Heart valve disease     Hemorrhagic disorder ([facility])     History of epistaxis    Hypertension     ARIANA (obstructive sleep apnea)     Renal mass     Restrictive lung disease     Sleep apnea     no cpap, 3L O2    Thyroid disease      Past Surgical History:   Procedure Laterality Date    MITRAL VALVE REPLACEMENT  09/08/2021    Redo MVR (#33 Angeli-Underwood Theon pericardial valve) by Dr. Chou, [place], [place]    PACEMAKER INSERTION Left 07/13/2018    Medtronic Dionne XT DR MRI [id number] implanted by Dr. Gillette, [place], [place]    MAZE PROCEDURE  05/2018    TRICUSPID VALVE REPAIR  05/2018    MITRAL VALVE REPAIR  05/2018     MV repair, TV repair, MYRNA ligation, MAZE procedure and biatrial resizing by Dr. Chou at [place], [place].    MITRAL VALVE REPAIR      OTHER      Nasal surgery for nosebleeds     Family History   Problem Relation Age of Onset    Cancer Mother     Stroke Father         CVA at 85, afib    Heart Disease Father      Social History     Socioeconomic History    Marital status:      Spouse name: Not on file    Number of children: Not on file    Years of education: Not on file    Highest  education level: Not on file   Occupational History    Not on file   Tobacco Use    Smoking status: Former     Current packs/day: 0.00     Types: Cigarettes     Quit date: 1970     Years since quittin.6    Smokeless tobacco: Never   Vaping Use    Vaping Use: Never used   Substance and Sexual Activity    Alcohol use: Not Currently    Drug use: Not Currently    Sexual activity: Not on file   Other Topics Concern    Not on file   Social History Narrative    Not on file     Social Determinants of Health     Financial Resource Strain: Not on file   Food Insecurity: Not on file   Transportation Needs: Not on file   Physical Activity: Not on file   Stress: Not on file   Social Connections: Not on file   Intimate Partner Violence: Not on file   Housing Stability: Not on file     No Known Allergies  Outpatient Encounter Medications as of 2023   Medication Sig Dispense Refill    levothyroxine (SYNTHROID) 112 MCG Tab Take 112 mcg by mouth every morning on an empty stomach.      dapagliflozin propanediol (FARXIGA) 5 MG Tab Take 1 Tablet by mouth every day. 30 Tablet 3    sotalol (BETAPACE) 80 MG Tab TAKE 1 TABLET BY MOUTH TWICE DAILY 180 Tablet 3    Probiotic Product (PROBIOTIC-10) Chew Tab Chew 1 Tablet every day.      ferrous sulfate 325 (65 Fe) MG tablet Take 1 Tablet by mouth every 48 hours.      magnesium oxide (MAG-OX) 400 MG Tab tablet Take 1 tablet by mouth every day.      potassium chloride (MICRO-K) 10 MEQ capsule Take 10 mEq by mouth every day. 6 days per week, skips       Multiple Vitamin (MULTI-VITAMINS) Tab Take 1 tablet by mouth every day.      apixaban (ELIQUIS) 5mg Tab Take 5 mg by mouth 2 times a day.      [DISCONTINUED] sacubitril-valsartan (ENTRESTO) 24-26 MG Tab Take 1 Tablet by mouth 2 times a day. (Patient not taking: Reported on 2023) 60 Tablet 3     No facility-administered encounter medications on file as of 2023.     ROS Complete review of systems negative except as noted in  "HPI/subjective           Objective     /69 (BP Location: Left arm, Patient Position: Sitting, BP Cuff Size: Adult)   Pulse 78   Resp 14   Ht 1.905 m (6' 3\")   Wt 91.6 kg (202 lb)   SpO2 98%   BMI 25.25 kg/m²     Physical Exam  Vitals reviewed.   Constitutional:       Appearance: Normal appearance. He is well-developed.   HENT:      Head: Normocephalic and atraumatic.   Eyes:      Pupils: Pupils are equal, round, and reactive to light.   Neck:      Vascular: No JVD.   Cardiovascular:      Rate and Rhythm: Normal rate. Rhythm irregular.      Pulses: Normal pulses.      Heart sounds: Normal heart sounds. No murmur heard.     No friction rub. No gallop.   Pulmonary:      Effort: Pulmonary effort is normal. No respiratory distress.      Breath sounds: Normal breath sounds.   Abdominal:      General: Bowel sounds are normal. There is no distension.      Palpations: Abdomen is soft.   Musculoskeletal:      Right lower leg: No edema.      Left lower leg: No edema.   Skin:     General: Skin is warm and dry.      Findings: No erythema.   Neurological:      General: No focal deficit present.      Mental Status: He is alert and oriented to person, place, and time.   Psychiatric:         Behavior: Behavior normal.       Lab Results   Component Value Date/Time    CHOLSTRLTOT 165 03/01/2022 04:47 AM    CHOLSTRLTOT 149 06/01/2021 08:39 AM    LDL 80 06/01/2021 08:39 AM    HDL 59 03/01/2022 04:47 AM    HDL 63 06/01/2021 08:39 AM    TRIGLYCERIDE 59 03/01/2022 04:47 AM    TRIGLYCERIDE 28 06/01/2021 08:39 AM       Lab Results   Component Value Date/Time    SODIUM 139 05/02/2023 12:12 PM    SODIUM 140 03/01/2022 04:47 AM    SODIUM 137 06/01/2021 08:39 AM    POTASSIUM 4.3 05/02/2023 12:12 PM    POTASSIUM 4.6 03/01/2022 04:47 AM    POTASSIUM 4.7 06/01/2021 08:39 AM    CHLORIDE 107 05/02/2023 12:12 PM    CHLORIDE 100 03/01/2022 04:47 AM    CHLORIDE 102 06/01/2021 08:39 AM    CO2 27 05/02/2023 12:12 PM    CO2 28 03/01/2022 04:47 " AM    CO2 28 06/01/2021 08:39 AM    GLUCOSE 74 05/02/2023 12:12 PM    GLUCOSE 95 03/01/2022 04:47 AM    GLUCOSE 91 06/01/2021 08:39 AM    BUN 20 05/02/2023 12:12 PM    BUN 20 03/01/2022 04:47 AM    BUN 22 06/01/2021 08:39 AM    CREATININE 0.96 05/02/2023 12:12 PM    CREATININE 1.13 03/01/2022 04:47 AM    CREATININE 0.85 06/01/2021 08:39 AM    BUNCREATRAT 18 03/01/2022 04:47 AM    GLOMRATE 77 05/02/2023 12:12 PM     Lab Results   Component Value Date/Time    ALKPHOSPHAT 62 05/02/2023 12:12 PM    ALKPHOSPHAT 59 06/01/2021 08:39 AM    ASTSGOT 20 05/02/2023 12:12 PM    ASTSGOT 22 06/01/2021 08:39 AM    ALTSGPT 15 (L) 05/02/2023 12:12 PM    ALTSGPT 20 06/01/2021 08:39 AM    TBILIRUBIN 0.8 05/02/2023 12:12 PM    TBILIRUBIN 1.0 06/01/2021 08:39 AM      ECHOCARDIOGRAM 3/18/2022  Severely reduced left ventricular systolic function.   The left ventricular ejection fraction is visually estimated to be 20-25%.   Grade III diastolic dysfunction (restrictive pattern).  Known mitral valve bioprosthesis with elevated transvalvular gradient.  Estimated right ventricular systolic pressure is 47 mmHg; mild pulmonary hypertension.   No pericardial effusion.    TTE (5/5/2023):  Compared to the prior study on 8/19/22, no significant changes.  Side-  by-side image comparison done.  Severely reduced left ventricular systolic function.  The left ventricular ejection fraction is visually estimated to be 25%.  Global hypokinesis with abnormal septal motion consistent with   ventricular pacing.  Severely dilated left atrium.  Known mitral valve bioprosthesis which is functioning normally with   appropriate transvalvular gradient. Mean transvalvular gradient is 5   mmHg at a heart rate of 69 BPM.  Dilated inferior vena cava without inspiratory collapse.         Assessment & Plan     1. Biventricular ICD (implantable cardioverter-defibrillator) in place  CELINA AND PE, SERUM    FREE K&L LT CHAINS, QT, SERUM    CELINA+PE RANDOM URINE    SPEP W/REFLEX  TO CELINA, A, G, M    NM-CARDIAC AMYLOIDOSIS PYP SCAN    XC-[id number]-[id number] PET W/FLOW RESERVE    proBrain Natriuretic Peptide, NT      2. Chronic anticoagulation  CELINA AND PE, SERUM    FREE K&L LT CHAINS, QT, SERUM    CELINA+PE RANDOM URINE    SPEP W/REFLEX TO CELINA, A, G, M    NM-CARDIAC AMYLOIDOSIS PYP SCAN    BE-[id number]-[id number] PET W/FLOW RESERVE    proBrain Natriuretic Peptide, NT      3. Chronic HFrEF (heart failure with reduced ejection fraction) (HCC)  CELINA AND PE, SERUM    FREE K&L LT CHAINS, QT, SERUM    CELINA+PE RANDOM URINE    SPEP W/REFLEX TO CELINA, A, G, M    NM-CARDIAC AMYLOIDOSIS PYP SCAN    [id number]-DEXPV-[id number] PET W/FLOW RESERVE    proBrain Natriuretic Peptide, NT      4. Encounter for monitoring sotalol therapy  CELINA AND PE, SERUM    FREE K&L LT CHAINS, QT, SERUM    CELINA+PE RANDOM URINE    SPEP W/REFLEX TO CELINA, A, G, M    NM-CARDIAC AMYLOIDOSIS PYP SCAN    YW-[id number]-[id number] PET W/FLOW RESERVE    proBrain Natriuretic Peptide, NT      5. Dilated cardiomyopathy (HCC)  CELINA AND PE, SERUM    FREE K&L LT CHAINS, QT, SERUM    CELINA+PE RANDOM URINE    SPEP W/REFLEX TO CELINA, A, G, M    NM-CARDIAC AMYLOIDOSIS PYP SCAN    KX-IACRN-[id number] PET W/FLOW RESERVE    proBrain Natriuretic Peptide, NT      6. S/P mitral valve replacement with bioprosthetic valve  CELINA AND PE, SERUM    FREE K&L LT CHAINS, QT, SERUM    CELINA+PE RANDOM URINE    SPEP W/REFLEX TO CELINA, A, G, M    NM-CARDIAC AMYLOIDOSIS PYP SCAN    [id number]-DVQXT-[id number] PET W/FLOW RESERVE    proBrain Natriuretic Peptide, NT      7. Secondary hypercoagulable state (HCC)  CELINA AND PE, SERUM    FREE K&L LT CHAINS, QT, SERUM    CELINA+PE RANDOM URINE    SPEP W/REFLEX TO CELINA, A, G, M    NM-CARDIAC AMYLOIDOSIS PYP SCAN    [id number]-[id number] PET W/FLOW RESERVE    proBrain Natriuretic Peptide, NT      8. Paroxysmal atrial fibrillation (HCC)  CELINA AND PE, SERUM    FREE K&L LT CHAINS, QT, SERUM    CELINA+PE RANDOM URINE    SPEP W/REFLEX TO CELINA, A, G, M    NM-CARDIAC AMYLOIDOSIS PYP SCAN    [id number]-LIMSC-[id number] PET  W/FLOW RESERVE    proBrain Natriuretic Peptide, NT      9. Nonischemic cardiomyopathy (HCC)  CELINA AND PE, SERUM    FREE K&L LT CHAINS, QT, SERUM    CELINA+PE RANDOM URINE    SPEP W/REFLEX TO CELINA, A, G, M    NM-CARDIAC AMYLOIDOSIS PYP SCAN    MR-QRAFZ-QAGUJDY PET W/FLOW RESERVE    proBrain Natriuretic Peptide, NT      10. Restrictive cardiomyopathy (HCC)  proBrain Natriuretic Peptide, NT      11. Other forms of dyspnea            Medical Decision Making: Today's Assessment/Status/Plan:        Systolic and Diastolic HFrEF, Stage C, Class II, LVEF 25%:  -Heart failure due to nonischemic cardiomyopathy.  Likely multifactorial with valvular and rhythm etiologies.  No ischemic work-up since last open heart surgery.  -Discussed Heart failure trajectory and prognosis with patient. Will continue to optimize medical therapy as tolerated. Advanced HF treatment, need for remote monitoring consideration at every visit.  -ACE-I/ARB/ARNI: Hold at this time due to poor tolerance  -Evidence Based Beta-blocker: Sotalol 80 mg twice daily  -Aldosterone Antagonist: Low threshold to consider  -SGLT2-I: Farxiga 5 mg daily  -Diuretic: Euvolemic on exam  -Labs: BMP, light chains. Will continue to closely monitor for side effects of patient's high risk medication(s) including renal function, NTproBNP, and electrolytes as needed  -Status post CRT-D  -Status post mitral valve repair 2021 for primary MR  -Reinforced s/sx of worsening heart failure with patient and weight monitoring. Pt verbalizes understanding. Pt to call office if present.  -Heart Failure Education: pt to be contacted by HF nurse for further education.  In the meantime, during visit today we discussed indications and use for each medication, importance of treatment adherence, definition of heart failure and potential etiologies for current diagnosis.  -Pharmacotherapy following as needed  -Compliance Barriers none at this time  -Genetic testing consideration none at this  time  -Advanced care planning: Defer at this time  -Consideration for LVAD and/or Heart transplant as necessary  -PYP and cardiac PET for infiltrative and ischemic cardiomyopathy consideration    Status post surgical mitral valve replacement, tricuspid valve repair  -Normal gradients on recent echo    Persistent A-fib status post maze, MYRNA ligation  -Followed by Dr. Garza  -Sotalaayush    Secondary prevention CRT-D  -Normal device function, no events on recent device interrogation on 7/19/2023    FU in clinic in 6 weeks with heart failure clinic. Sooner if needed.    Patient verbalizes understanding and agrees with the plan of care.     I personally spent a total of 41 minutes which includes face-to-face time and non-face-to-face time spent on preparing to see the patient, reviewing prior notes and tests, obtaining history from the patient, performing a medically appropriate exam, counseling and educating the patient, ordering medications/tests/procedures/referrals as clinically indicated,  and documenting information in the electronic medical record.    SYD Gallegos.   [facility]  [phone]    PLEASE NOTE: This Note was created using voice recognition Software. I have made every reasonable attempt to correct obvious errors, but I expect that there are errors of grammar and possibly content that I did not discover before finalizing the note

## 2023-08-29 LAB — EKG IMPRESSION: NORMAL

## 2023-09-06 ENCOUNTER — HOSPITAL ENCOUNTER (OUTPATIENT)
Dept: RADIOLOGY | Facility: MEDICAL CENTER | Age: 73
End: 2023-09-06
Attending: NURSE PRACTITIONER
Payer: MEDICARE

## 2023-09-06 DIAGNOSIS — Z79.899 ENCOUNTER FOR MONITORING SOTALOL THERAPY: ICD-10-CM

## 2023-09-06 DIAGNOSIS — I50.22 CHRONIC HFREF (HEART FAILURE WITH REDUCED EJECTION FRACTION) (HCC): ICD-10-CM

## 2023-09-06 DIAGNOSIS — Z51.81 ENCOUNTER FOR MONITORING SOTALOL THERAPY: ICD-10-CM

## 2023-09-06 DIAGNOSIS — I42.0 DILATED CARDIOMYOPATHY (HCC): ICD-10-CM

## 2023-09-06 DIAGNOSIS — I48.0 PAROXYSMAL ATRIAL FIBRILLATION (HCC): ICD-10-CM

## 2023-09-06 DIAGNOSIS — Z79.01 CHRONIC ANTICOAGULATION: ICD-10-CM

## 2023-09-06 DIAGNOSIS — D68.69 SECONDARY HYPERCOAGULABLE STATE (HCC): ICD-10-CM

## 2023-09-06 DIAGNOSIS — I42.8 NONISCHEMIC CARDIOMYOPATHY (HCC): ICD-10-CM

## 2023-09-06 DIAGNOSIS — Z95.3 S/P MITRAL VALVE REPLACEMENT WITH BIOPROSTHETIC VALVE: ICD-10-CM

## 2023-09-06 DIAGNOSIS — Z95.810 BIVENTRICULAR ICD (IMPLANTABLE CARDIOVERTER-DEFIBRILLATOR) IN PLACE: ICD-10-CM

## 2023-09-06 PROCEDURE — 93018 CV STRESS TEST I&R ONLY: CPT | Performed by: INTERNAL MEDICINE

## 2023-09-06 PROCEDURE — 78431 MYOCRD IMG PET RST&STRS CT: CPT

## 2023-09-06 PROCEDURE — 78434 AQMBF PET REST & RX STRESS: CPT | Mod: 26 | Performed by: INTERNAL MEDICINE

## 2023-09-06 PROCEDURE — 78431 MYOCRD IMG PET RST&STRS CT: CPT | Mod: 26 | Performed by: INTERNAL MEDICINE

## 2023-09-07 ENCOUNTER — OFFICE VISIT (OUTPATIENT)
Dept: CARDIOLOGY | Facility: MEDICAL CENTER | Age: 73
End: 2023-09-07
Attending: INTERNAL MEDICINE
Payer: MEDICARE

## 2023-09-07 VITALS
WEIGHT: 200 LBS | RESPIRATION RATE: 16 BRPM | HEART RATE: 78 BPM | BODY MASS INDEX: 24.87 KG/M2 | HEIGHT: 75 IN | SYSTOLIC BLOOD PRESSURE: 110 MMHG | DIASTOLIC BLOOD PRESSURE: 70 MMHG | OXYGEN SATURATION: 98 %

## 2023-09-07 DIAGNOSIS — I48.21 ATRIAL FIBRILLATION, PERMANENT (HCC): ICD-10-CM

## 2023-09-07 DIAGNOSIS — Z95.3 S/P MITRAL VALVE REPLACEMENT WITH BIOPROSTHETIC VALVE: ICD-10-CM

## 2023-09-07 DIAGNOSIS — Z98.890 HISTORY OF TRICUSPID VALVE REPAIR: ICD-10-CM

## 2023-09-07 DIAGNOSIS — Z79.01 CHRONIC ANTICOAGULATION: ICD-10-CM

## 2023-09-07 DIAGNOSIS — I50.22 CHRONIC HFREF (HEART FAILURE WITH REDUCED EJECTION FRACTION) (HCC): ICD-10-CM

## 2023-09-07 DIAGNOSIS — I42.8 NONISCHEMIC CARDIOMYOPATHY (HCC): ICD-10-CM

## 2023-09-07 DIAGNOSIS — Z98.890 HISTORY OF MITRAL VALVE REPAIR: ICD-10-CM

## 2023-09-07 DIAGNOSIS — D68.69 SECONDARY HYPERCOAGULABLE STATE (HCC): ICD-10-CM

## 2023-09-07 PROBLEM — I48.0 PAROXYSMAL ATRIAL FIBRILLATION (HCC): Status: RESOLVED | Noted: 2021-10-27 | Resolved: 2023-09-07

## 2023-09-07 PROBLEM — I10 ESSENTIAL HYPERTENSION, BENIGN: Status: RESOLVED | Noted: 2022-05-05 | Resolved: 2023-09-07

## 2023-09-07 PROBLEM — I42.5 RESTRICTIVE CARDIOMYOPATHY (HCC): Status: RESOLVED | Noted: 2022-11-21 | Resolved: 2023-09-07

## 2023-09-07 PROCEDURE — 99212 OFFICE O/P EST SF 10 MIN: CPT

## 2023-09-07 PROCEDURE — 99212 OFFICE O/P EST SF 10 MIN: CPT | Performed by: INTERNAL MEDICINE

## 2023-09-07 PROCEDURE — 99214 OFFICE O/P EST MOD 30 MIN: CPT | Performed by: INTERNAL MEDICINE

## 2023-09-07 PROCEDURE — 3078F DIAST BP <80 MM HG: CPT | Performed by: INTERNAL MEDICINE

## 2023-09-07 PROCEDURE — 3074F SYST BP LT 130 MM HG: CPT | Performed by: INTERNAL MEDICINE

## 2023-09-07 RX ORDER — MULTIVIT WITH MINERALS/LUTEIN
TABLET ORAL
COMMUNITY

## 2023-09-07 ASSESSMENT — FIBROSIS 4 INDEX: FIB4 SCORE: 1.770506672551962005

## 2023-09-07 ASSESSMENT — ENCOUNTER SYMPTOMS
MYALGIAS: 0
SHORTNESS OF BREATH: 0
LOSS OF CONSCIOUSNESS: 0
COUGH: 0
PALPITATIONS: 0
DIZZINESS: 0

## 2023-09-07 NOTE — PROGRESS NOTES
Chief Complaint   Patient presents with    Congestive Heart Failure     F/V Dx: Chronic HFrEF (heart failure with reduced ejection fraction) (HCC)    Atrial Fibrillation     F/V Dx: Paroxysmal atrial fibrillation (HCC)        Hypertension       Subjective     Pavan Cueva is a 73 y.o. male who presents today for follow-up cardiac care.     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    Since 5/24/2023 appointment the patient has had no cardiac symptoms including chest pain, palpitations, shortness of breath.  He continues to be very active as previously noted.  Blood pressure continues to run on the low normal range.  Once again chart indicates that he has had episodes of low blood pressure on enalapril and metoprolol were previously discontinued.  He recently was seen in EP clinic with Dr. Garza 7/19/2023 who ordered low-dose Entresto however the patient did not start it because concern about prior low blood pressures on previous medications.  He was also sent to the Heart Failure Clinic with Ana FONSECA who ordered some additional tests including cardiac PET scan though the patient has known normal coronary arteries and also a PYP scan.  Of note prior to his redo MVR at [facility] on 9/8/2021 his preoperative coronary angiogram showed normal coronary arteries with anterior takeoff of a nondominant RCA.  EF at that time was 25-35%.    Past Medical History:    Diagnosis Date    AF (atrial fibrillation) ([facility])          Anemia     AV block, complete (HCC)     Cardiomyopathy, dilated ([facility]) 2022    Echocardiogram with severely dilated LV, LVEF 20-25%. Global hypokinesis. Normal RV. Severely dilated LA and RV. MV prothesis with elevated transvalvular gradient. Trace AR, mild TR. RVSP 47mmHg.    Chronic anticoagulation     Heart valve disease     Hemorrhagic disorder ([facility])     History of epistaxis    Hypertension     ARIANA (obstructive sleep apnea)     Renal mass     Restrictive lung disease     Sleep apnea     no cpap, 3L O2    Thyroid disease      Past Surgical History:   Procedure Laterality Date    MITRAL VALVE REPLACEMENT  2021    Redo MVR (#33 Angeli-Underwood Theon pericardial valve) by Dr. Chou, [place], [place]    PACEMAKER INSERTION Left 2018    Medtronic [place] XT DR DAVIN [id number] implanted by Dr. Gillette, [place], [place]    MAZE PROCEDURE  2018    TRICUSPID VALVE REPAIR  2018    MITRAL VALVE REPAIR  2018     MV repair, TV repair, MYRNA ligation, MAZE procedure and biatrial resizing by Dr. Chou at [place], [place].    MITRAL VALVE REPAIR      OTHER      Nasal surgery for nosebleeds     Family History   Problem Relation Age of Onset    Cancer Mother     Stroke Father         CVA at 85, afib    Heart Disease Father      Social History     Socioeconomic History    Marital status:      Spouse name: Not on file    Number of children: Not on file    Years of education: Not on file    Highest education level: Not on file   Occupational History    Not on file   Tobacco Use    Smoking status: Former     Current packs/day: 0.00     Types: Cigarettes     Quit date: 1970     Years since quittin.7    Smokeless tobacco: Never   Vaping Use    Vaping Use: Never used   Substance and Sexual Activity    Alcohol use: Not Currently    Drug use: Not Currently    Sexual activity: Not on file   Other Topics Concern    Not on file   Social  "History Narrative    Not on file     Social Determinants of Health     Financial Resource Strain: Not on file   Food Insecurity: Not on file   Transportation Needs: Not on file   Physical Activity: Not on file   Stress: Not on file   Social Connections: Not on file   Intimate Partner Violence: Not on file   Housing Stability: Not on file     No Known Allergies  Outpatient Encounter Medications as of 9/7/2023   Medication Sig Dispense Refill    Ascorbic Acid (VITAMIN C) 1000 MG Tab Take  by mouth.      levothyroxine (SYNTHROID) 112 MCG Tab Take 112 mcg by mouth every morning on an empty stomach.      dapagliflozin propanediol (FARXIGA) 5 MG Tab Take 1 Tablet by mouth every day. 30 Tablet 3    sotalol (BETAPACE) 80 MG Tab TAKE 1 TABLET BY MOUTH TWICE DAILY 180 Tablet 3    Probiotic Product (PROBIOTIC-10) Chew Tab Chew 1 Tablet every day.      ferrous sulfate 325 (65 Fe) MG tablet Take 1 Tablet by mouth every 48 hours.      magnesium oxide (MAG-OX) 400 MG Tab tablet Take 1 tablet by mouth every day.      potassium chloride (MICRO-K) 10 MEQ capsule Take 10 mEq by mouth every day. 6 days per week, skips sunday      Multiple Vitamin (MULTI-VITAMINS) Tab Take 1 tablet by mouth every day.      apixaban (ELIQUIS) 5mg Tab Take 5 mg by mouth 2 times a day.       No facility-administered encounter medications on file as of 9/7/2023.     Review of Systems   Respiratory:  Negative for cough and shortness of breath.    Cardiovascular:  Negative for chest pain and palpitations.   Musculoskeletal:  Negative for myalgias.   Neurological:  Negative for dizziness and loss of consciousness.              Objective     /70 (BP Location: Left arm, Patient Position: Sitting, BP Cuff Size: Adult)   Pulse 78   Resp 16   Ht 1.905 m (6' 3\")   Wt 90.7 kg (200 lb)   SpO2 98%   BMI 25.00 kg/m²   BP Readings from Last 5 Encounters:   09/07/23 110/70   08/25/23 110/69   07/19/23 110/64   06/22/23 119/66   05/24/23 110/62      Pulse " Readings from Last 5 Encounters:   09/07/23 78   08/25/23 78   07/19/23 65   06/22/23 60   05/24/23 78      Wt Readings from Last 5 Encounters:   09/07/23 90.7 kg (200 lb)   08/25/23 91.6 kg (202 lb)   07/19/23 91.6 kg (202 lb)   06/22/23 93.4 kg (206 lb)   05/24/23 92.1 kg (203 lb)       Physical Exam  Eyes:      Conjunctiva/sclera: Conjunctivae normal.      Pupils: Pupils are equal, round, and reactive to light.   Neck:      Vascular: No JVD.   Cardiovascular:      Rate and Rhythm: Normal rate and regular rhythm.      Pulses: Normal pulses.      Heart sounds: Normal heart sounds.   Pulmonary:      Effort: Pulmonary effort is normal. No accessory muscle usage or respiratory distress.      Breath sounds: Normal breath sounds. No wheezing or rales.   Musculoskeletal:      Right lower leg: No edema.      Left lower leg: No edema.   Skin:     General: Skin is warm and dry.      Findings: No rash.      Nails: There is no clubbing.   Neurological:      Mental Status: He is alert and oriented to person, place, and time.   Psychiatric:         Behavior: Behavior normal.            CARDIAC PET SCAN 9/6/2023   NUCLEAR IMAGING INTERPRETATION   There is a large, fixed moderate to severe defect involving most of the    inferior, inferolateral, and anterolateral walls consistent with prior    transmural infarct.   There is no significant reversibility (SDS = 0).   Moderately reduced left ventricular systolic function.   Normal TID ratio.   Reduced coronary flow reserve of (1.3).   ECG INTERPRETATION   Unable to evaluate due to ventricular paced rhythm.    ECHOCARDIOGRAM 8/19/2022  The left ventricle is mildly dilated.  Severely reduced left ventricular systolic function.  The left ventricular ejection fraction is visually estimated to be 25%.  Grade III diastolic dysfunction (restrictive pattern).  Known mitral valve bioprosthesis which is functioning normally with   appropriate transvalvular gradient.  Mild tricuspid  regurgitation.  Right ventricular systolic pressure is estimated to be 60 mmHg.  Pacer/ICD wire seen in the right ventricle.  Severely dilated left and right atria.  Compared to the images of the prior study 3/18/2022, there has been no   significant change except pulmonary pressures now measure higher.     ECHOCARDIOGRAM 5/5/2023  Compared to the prior study on 8/19/22, no significant changes.  Side-  by-side image comparison done.  Severely reduced left ventricular systolic function.  The left ventricular ejection fraction is visually estimated to be 25%.  Global hypokinesis with abnormal septal motion consistent with   ventricular pacing.  Severely dilated left atrium.  Known mitral valve bioprosthesis which is functioning normally with   appropriate transvalvular gradient. Mean transvalvular gradient is 5   mmHg at a heart rate of 69 BPM.  Dilated inferior vena cava without inspiratory collapse.    CARDIAC CATHETERIZATION 2018 ([facility])  LHC: mild disease, EF 50%, 4+ MR, 2+ AR.    CARDIAC CATHETERIZATION 8/18/2021 ([facility])  Left ventricular ejection fraction 25-35%  At least moderate mitral regurgitation  Mild to moderate pulmonary hypertension  Normal epicardial coronary arteries, anterior takeoff of right coronary artery    Assessment & Plan     1. Chronic HFrEF (heart failure with reduced ejection fraction) (HCC)        2. Nonischemic cardiomyopathy (HCC)        3. Atrial fibrillation, permanent (HCC)        4. Secondary hypercoagulable state (HCC)        5. Chronic anticoagulation        6. S/P mitral valve replacement with bioprosthetic valve        7. History of mitral valve repair        8. History of tricuspid valve repair            Medical Decision Making: Today's Assessment/Status/Plan:   Assessment  HFrEF 25%.  Nonischemic cardiomyopathy  S/P MVR (33 mm Angeli Underwood Theon pericardial valve) 9/8/2021 ([facility], [place], [place]).  S/P mitral valve repair,  tricuspid valve repair, MAZE, MYRNA ligation, PPM 7/13/2018 ([facility] [place], [place])  CRT-D 12/22/2021 96% ventricular pacing  Atrial fibrillation, permanent  Amiodarone therapy, started 12/2021, stopped 10/2022; sotalol started  Anticoagulation, apixaban.  Hypothyroidism amiodarone induced, followed by Dr. Mackay endocrinologist [place].  Hypertension.  ARIANA     Recommendation Discussion  HFrEF 25%, clinically stable, highly functional, NYHA class I, for GDMT only on dapagliflozin; attempts at ACE inhibitor and metoprolol were unsuccessful due to symptomatic low BP, discussed considering rechallenge with low-dose ACE inhibitor enalapril 2.5 mg daily or lisinopril 2.5 mg daily, would be reluctant to start low-dose Entresto due to prior low blood pressures on similar agents, the patient will consider this.  Atrial fibrillation, permanent, on sotalol, apixaban  Anticoagulation, on apixaban, normal renal function, continue 5 mg twice daily  MVR, functioning normally  CRT-D V paced 99%  Reviewed cardiac PET scan results showing multiple fixed perfusion defects completely inconsistent with known normal epicardial coronary arteries.  RTC 4-6 months.

## 2023-09-07 NOTE — PROGRESS NOTES
Chief Complaint   Patient presents with    Congestive Heart Failure     F/V Dx: Chronic HFrEF (heart failure with reduced ejection fraction) (HCC)    Atrial Fibrillation     F/V Dx: Paroxysmal atrial fibrillation (HCC)        Hypertension       Subjective     Pavan Cueva is a 73 y.o. male who presents today for follow-up cardiac care.    The patient has significant past medical history of S/P mitral valve repair, tricuspid valve repair, MAZE, MYRNA ligation, PPM 7/13/2018 ([facility], [place]), PAF, redo S/P mitral valve replacement (33 mm Angeli Underwood Theon pericardial valve) 9/8/2021 ([facility]), cardiac arrest 12/20/2021 ([facility]), CRT-D 12/22/2021 ([facility]), nonischemic cardiomyopathy, HFrEF 25%, amiodarone therapy, hypothyroidism (amiodarone induced), anticoagulation, hypertension, ARIANA.    Since 5/24/2023 appointment the patient has had no cardiac symptoms including chest pain, palpitations, shortness of breath.    Since 11/21/2022 appointment the patient patient was seen in follow-up by EP Dr. Garza on 4/17/2023.  He has had no cardiac symptoms including chest pain, palpitations, shortness of breath.  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  He is very active outdoors.  He raises chickens.  He does various projects and currently is restoring some outdoor furniture.  He carefully monitors his blood pressure and consistently is running on average 96/53 with heart rates in the 60s.  He had a follow-up echocardiogram which continues to show LVEF 25%.  He is exceedingly anxious about his heart condition and gets concerned about various intermittent symptoms to the point that he considered moving within a block of [facility].      Past Medical History:   Diagnosis Date    AF (atrial fibrillation) ([facility])          Anemia     AV block, complete (HCC)     Cardiomyopathy, dilated (HCC) 03/2022    Echocardiogram with severely dilated LV,  LVEF 20-25%. Global hypokinesis. Normal RV. Severely dilated LA and RV. MV prothesis with elevated transvalvular gradient. Trace AR, mild TR. RVSP 47mmHg.    Chronic anticoagulation     Heart valve disease     Hemorrhagic disorder (HCC)     History of epistaxis    Hypertension     ARIANA (obstructive sleep apnea)     Renal mass     Restrictive lung disease     Sleep apnea     no cpap, 3L O2    Thyroid disease      Past Surgical History:   Procedure Laterality Date    MITRAL VALVE REPLACEMENT  2021    Redo MVR (#33 Angeli-Underwood Theon pericardial valve) by Dr. Chou, [place], [place]    PACEMAKER INSERTION Left 2018    Medtronic [place] XT DR MRI [id number] implanted by Dr. Gillette, [place], [place]    MAZE PROCEDURE  2018    TRICUSPID VALVE REPAIR  2018    MITRAL VALVE REPAIR  2018     MV repair, TV repair, MYRNA ligation, MAZE procedure and biatrial resizing by Dr. Chou at [place], [place].    MITRAL VALVE REPAIR      OTHER      Nasal surgery for nosebleeds     Family History   Problem Relation Age of Onset    Cancer Mother     Stroke Father         CVA at 85, afib    Heart Disease Father      Social History     Socioeconomic History    Marital status:      Spouse name: Not on file    Number of children: Not on file    Years of education: Not on file    Highest education level: Not on file   Occupational History    Not on file   Tobacco Use    Smoking status: Former     Current packs/day: 0.00     Types: Cigarettes     Quit date: 1970     Years since quittin.7    Smokeless tobacco: Never   Vaping Use    Vaping Use: Never used   Substance and Sexual Activity    Alcohol use: Not Currently    Drug use: Not Currently    Sexual activity: Not on file   Other Topics Concern    Not on file   Social History Narrative    Not on file     Social Determinants of Health     Financial Resource Strain: Not on file   Food Insecurity: Not on file   Transportation Needs: Not on file  "  Physical Activity: Not on file   Stress: Not on file   Social Connections: Not on file   Intimate Partner Violence: Not on file   Housing Stability: Not on file     No Known Allergies  Outpatient Encounter Medications as of 9/7/2023   Medication Sig Dispense Refill    Ascorbic Acid (VITAMIN C) 1000 MG Tab Take  by mouth.      levothyroxine (SYNTHROID) 112 MCG Tab Take 112 mcg by mouth every morning on an empty stomach.      dapagliflozin propanediol (FARXIGA) 5 MG Tab Take 1 Tablet by mouth every day. 30 Tablet 3    sotalol (BETAPACE) 80 MG Tab TAKE 1 TABLET BY MOUTH TWICE DAILY 180 Tablet 3    Probiotic Product (PROBIOTIC-10) Chew Tab Chew 1 Tablet every day.      ferrous sulfate 325 (65 Fe) MG tablet Take 1 Tablet by mouth every 48 hours.      magnesium oxide (MAG-OX) 400 MG Tab tablet Take 1 tablet by mouth every day.      potassium chloride (MICRO-K) 10 MEQ capsule Take 10 mEq by mouth every day. 6 days per week, skips sunday      Multiple Vitamin (MULTI-VITAMINS) Tab Take 1 tablet by mouth every day.      apixaban (ELIQUIS) 5mg Tab Take 5 mg by mouth 2 times a day.       No facility-administered encounter medications on file as of 9/7/2023.     Review of Systems   Respiratory:  Negative for cough and shortness of breath.    Cardiovascular:  Negative for chest pain and palpitations.   Musculoskeletal:  Negative for myalgias.   Neurological:  Negative for dizziness and loss of consciousness.           /70 (BP Location: Left arm, Patient Position: Sitting, BP Cuff Size: Adult)   Pulse 78   Resp 16   Ht 1.905 m (6' 3\")   Wt 90.7 kg (200 lb)   SpO2 98%   BMI 25.00 kg/m²     Physical Exam  Vitals reviewed.   Constitutional:       General: He is not in acute distress.     Appearance: He is well-developed.   Eyes:      Conjunctiva/sclera: Conjunctivae normal.      Pupils: Pupils are equal, round, and reactive to light.   Neck:      Vascular: No JVD.   Cardiovascular:      Rate and Rhythm: Normal rate and " regular rhythm.      Pulses: Normal pulses.           Carotid pulses are 2+ on the right side and 2+ on the left side.     Heart sounds: Murmur heard.      No friction rub. No gallop.      Comments: Midline scar  Generator left subclavian area  Pulmonary:      Effort: Pulmonary effort is normal. No accessory muscle usage or respiratory distress.      Breath sounds: Normal breath sounds. No wheezing or rales.   Musculoskeletal:      Cervical back: Normal range of motion and neck supple.      Right lower leg: No edema.      Left lower leg: No edema.   Skin:     General: Skin is warm and dry.      Findings: No rash.      Nails: There is no clubbing.   Neurological:      Mental Status: He is alert and oriented to person, place, and time.   Psychiatric:         Behavior: Behavior normal.              ECHOCARDIOGRAM 3/18/2022  Severely reduced left ventricular systolic function.   The left ventricular ejection fraction is visually estimated to be 20-25%.   Grade III diastolic dysfunction (restrictive pattern).  Known mitral valve bioprosthesis with elevated transvalvular gradient.  Estimated right ventricular systolic pressure is 47 mmHg; mild pulmonary hypertension.   No pericardial effusion.    EKG 5/5/2021 AV paced rate 61 personally interpreted.    EKG 11/17/2022 AV pacing, personally reviewed    Assessment & Plan     No diagnosis found.      Medical Decision Making: Today's Assessment/Status/Plan:   Assessment  HFrEF 25%.  Nonischemic cardiomyopathy  S/P MVR (33 mm Angeli Underwood Theon pericardial valve) 9/8/2021 ([facility], [place], [place]).  S/P mitral valve repair, tricuspid valve repair, MAZE, MYRNA ligation, PPM 7/13/2018 ([facility], [place], [place])  CRT-D 12/22/2021  PAF  Amiodarone therapy, started 12/2021, stopped 10/2022; sotalol started  Anticoagulation, apixaban.  Hypothyroidism amiodarone induced, followed by Dr. Mackay endocrinologist [place].  Hypertension.  ARIANA    Recommendation  Discussion  HFrEF 25%, clinically stable, highly functional, NYHA class I, for GDMT only on dapagliflozin; attempts at ACE inhibitor and metoprolol were unsuccessful due to symptomatic low BP.  PAF, AV pacing, now on sotalol, apixaban  Anticoagulation, on apixaban, normal renal function, continue 5 mg twice daily  MVR, functioning normally  CRT-D A paced 99%, V paced 98%, no mode switching  RTC 6 months.

## 2023-09-21 ENCOUNTER — HOSPITAL ENCOUNTER (OUTPATIENT)
Dept: RADIOLOGY | Facility: MEDICAL CENTER | Age: 73
End: 2023-09-21
Attending: NURSE PRACTITIONER
Payer: MEDICARE

## 2023-09-21 DIAGNOSIS — I42.8 NONISCHEMIC CARDIOMYOPATHY (HCC): ICD-10-CM

## 2023-09-21 DIAGNOSIS — I48.0 PAROXYSMAL ATRIAL FIBRILLATION (HCC): ICD-10-CM

## 2023-09-21 DIAGNOSIS — D68.69 SECONDARY HYPERCOAGULABLE STATE (HCC): ICD-10-CM

## 2023-09-21 DIAGNOSIS — Z95.3 S/P MITRAL VALVE REPLACEMENT WITH BIOPROSTHETIC VALVE: ICD-10-CM

## 2023-09-21 DIAGNOSIS — Z51.81 ENCOUNTER FOR MONITORING SOTALOL THERAPY: ICD-10-CM

## 2023-09-21 DIAGNOSIS — Z95.810 BIVENTRICULAR ICD (IMPLANTABLE CARDIOVERTER-DEFIBRILLATOR) IN PLACE: ICD-10-CM

## 2023-09-21 DIAGNOSIS — Z79.899 ENCOUNTER FOR MONITORING SOTALOL THERAPY: ICD-10-CM

## 2023-09-21 DIAGNOSIS — I42.0 DILATED CARDIOMYOPATHY (HCC): ICD-10-CM

## 2023-09-21 DIAGNOSIS — Z79.01 CHRONIC ANTICOAGULATION: ICD-10-CM

## 2023-09-21 DIAGNOSIS — I50.22 CHRONIC HFREF (HEART FAILURE WITH REDUCED EJECTION FRACTION) (HCC): ICD-10-CM

## 2023-09-21 PROCEDURE — A9538 TC99M PYROPHOSPHATE: HCPCS

## 2023-10-02 ENCOUNTER — PATIENT MESSAGE (OUTPATIENT)
Dept: PHARMACY | Facility: MEDICAL CENTER | Age: 73
End: 2023-10-02
Payer: MEDICARE

## 2023-10-02 ENCOUNTER — PATIENT MESSAGE (OUTPATIENT)
Dept: CARDIOLOGY | Facility: MEDICAL CENTER | Age: 73
End: 2023-10-02
Payer: MEDICARE

## 2023-10-02 DIAGNOSIS — I50.22 CHRONIC HFREF (HEART FAILURE WITH REDUCED EJECTION FRACTION) (HCC): ICD-10-CM

## 2023-10-02 RX ORDER — DAPAGLIFLOZIN 5 MG/1
5 TABLET, FILM COATED ORAL DAILY
Qty: 90 TABLET | Refills: 3 | Status: SHIPPED | OUTPATIENT
Start: 2023-10-02 | End: 2023-10-03 | Stop reason: SDUPTHER

## 2023-10-03 ENCOUNTER — TELEPHONE (OUTPATIENT)
Dept: CARDIOLOGY | Facility: MEDICAL CENTER | Age: 73
End: 2023-10-03
Payer: MEDICARE

## 2023-10-03 DIAGNOSIS — I50.22 CHRONIC HFREF (HEART FAILURE WITH REDUCED EJECTION FRACTION) (HCC): ICD-10-CM

## 2023-10-03 RX ORDER — DAPAGLIFLOZIN 5 MG/1
5 TABLET, FILM COATED ORAL DAILY
Qty: 90 TABLET | Refills: 3 | Status: SHIPPED | OUTPATIENT
Start: 2023-10-03 | End: 2023-10-20 | Stop reason: SDUPTHER

## 2023-10-03 NOTE — TELEPHONE ENCOUNTER
MATTHEW    Caller: - Karen (spouse)    Medication Name and Dosage:    dapagliflozin propanediol (FARXIGA) 5 MG Tab (Order #[id number]) on 10/2/23    Medication amount left: 0    Preferred Pharmacy:   [facility]    Other questions (Topic): [facility] said they still have not received script.     Callback Number (Will only call for issues): [phone]    Thank you,   Erin ESPINO

## 2023-10-03 NOTE — TELEPHONE ENCOUNTER
Marybel message sent to patient.    ----- Message from MAKEDA Gallegos sent at 10/3/2023  2:48 PM PDT -----  No acute findings.  We can discuss next steps and follow-up on 10/20/2023  ----- Message -----  From: Reena Topete R.N.  Sent: 10/3/2023   8:19 AM PDT  To: MAKEDA Gallegos    To: JOSHUA    Please advise if any further recommendations. Thank you

## 2023-10-05 RX ORDER — DAPAGLIFLOZIN 5 MG/1
5 TABLET, FILM COATED ORAL
Qty: 30 TABLET | Refills: 11 | Status: SHIPPED | OUTPATIENT
Start: 2023-10-05 | End: 2023-10-20

## 2023-10-08 ENCOUNTER — NON-PROVIDER VISIT (OUTPATIENT)
Dept: CARDIOLOGY | Facility: MEDICAL CENTER | Age: 73
End: 2023-10-08
Payer: MEDICARE

## 2023-10-08 PROCEDURE — 93295 DEV INTERROG REMOTE 1/2/MLT: CPT | Performed by: INTERNAL MEDICINE

## 2023-10-09 NOTE — CARDIAC REMOTE MONITOR - SCAN
Device transmission reviewed. Device demonstrated appropriate function.       Electronically Signed by: Pavan Alston M.D.    10/9/2023  2:50 PM

## 2023-10-15 ENCOUNTER — PATIENT MESSAGE (OUTPATIENT)
Dept: CARDIOLOGY | Facility: MEDICAL CENTER | Age: 73
End: 2023-10-15
Payer: MEDICARE

## 2023-10-15 DIAGNOSIS — I48.21 ATRIAL FIBRILLATION, PERMANENT (HCC): ICD-10-CM

## 2023-10-16 NOTE — PATIENT COMMUNICATION
"Per MATTHEW OV note 09/07/23,  \" Anticoagulation, on Apixaban, normal renal function, continue 5 mg twice daily\"  "

## 2023-10-20 ENCOUNTER — OFFICE VISIT (OUTPATIENT)
Dept: CARDIOLOGY | Facility: MEDICAL CENTER | Age: 73
End: 2023-10-20
Attending: INTERNAL MEDICINE
Payer: MEDICARE

## 2023-10-20 VITALS
HEART RATE: 64 BPM | SYSTOLIC BLOOD PRESSURE: 102 MMHG | HEIGHT: 75 IN | BODY MASS INDEX: 25.47 KG/M2 | RESPIRATION RATE: 20 BRPM | OXYGEN SATURATION: 98 % | DIASTOLIC BLOOD PRESSURE: 60 MMHG | WEIGHT: 204.8 LBS

## 2023-10-20 DIAGNOSIS — I50.22 CHRONIC HFREF (HEART FAILURE WITH REDUCED EJECTION FRACTION) (HCC): ICD-10-CM

## 2023-10-20 DIAGNOSIS — I47.20 VENTRICULAR TACHYCARDIA (HCC): ICD-10-CM

## 2023-10-20 DIAGNOSIS — Z95.810 BIVENTRICULAR ICD (IMPLANTABLE CARDIOVERTER-DEFIBRILLATOR) IN PLACE: ICD-10-CM

## 2023-10-20 DIAGNOSIS — D68.69 SECONDARY HYPERCOAGULABLE STATE (HCC): ICD-10-CM

## 2023-10-20 DIAGNOSIS — I42.8 NONISCHEMIC CARDIOMYOPATHY (HCC): ICD-10-CM

## 2023-10-20 DIAGNOSIS — Z95.0 BIVENTRICULAR CARDIAC PACEMAKER IN SITU: ICD-10-CM

## 2023-10-20 DIAGNOSIS — Z95.3 S/P MITRAL VALVE REPLACEMENT WITH BIOPROSTHETIC VALVE: ICD-10-CM

## 2023-10-20 DIAGNOSIS — Z79.01 CHRONIC ANTICOAGULATION: ICD-10-CM

## 2023-10-20 DIAGNOSIS — I48.21 ATRIAL FIBRILLATION, PERMANENT (HCC): ICD-10-CM

## 2023-10-20 PROCEDURE — 3074F SYST BP LT 130 MM HG: CPT | Performed by: NURSE PRACTITIONER

## 2023-10-20 PROCEDURE — 99211 OFF/OP EST MAY X REQ PHY/QHP: CPT | Performed by: NURSE PRACTITIONER

## 2023-10-20 PROCEDURE — 3078F DIAST BP <80 MM HG: CPT | Performed by: NURSE PRACTITIONER

## 2023-10-20 PROCEDURE — 99212 OFFICE O/P EST SF 10 MIN: CPT | Performed by: NURSE PRACTITIONER

## 2023-10-20 PROCEDURE — 99202 OFFICE O/P NEW SF 15 MIN: CPT

## 2023-10-20 PROCEDURE — 99214 OFFICE O/P EST MOD 30 MIN: CPT | Performed by: NURSE PRACTITIONER

## 2023-10-20 RX ORDER — FUROSEMIDE 20 MG/1
TABLET ORAL
COMMUNITY
End: 2023-10-20

## 2023-10-20 RX ORDER — DAPAGLIFLOZIN 5 MG/1
TABLET, FILM COATED ORAL
COMMUNITY
End: 2023-10-20

## 2023-10-20 RX ORDER — DAPAGLIFLOZIN 10 MG/1
10 TABLET, FILM COATED ORAL DAILY
Qty: 90 TABLET | Refills: 3 | Status: SHIPPED | OUTPATIENT
Start: 2023-10-20 | End: 2023-11-02

## 2023-10-20 RX ORDER — ALPRAZOLAM 0.25 MG/1
1 TABLET ORAL 3 TIMES DAILY
COMMUNITY
End: 2023-10-20

## 2023-10-20 RX ORDER — DIGOXIN 0.25 MG/ML
INJECTION INTRAMUSCULAR; INTRAVENOUS
COMMUNITY
End: 2023-10-20

## 2023-10-20 RX ORDER — METOPROLOL SUCCINATE 25 MG/1
TABLET, EXTENDED RELEASE ORAL
COMMUNITY
End: 2023-10-20

## 2023-10-20 RX ORDER — LEVOTHYROXINE SODIUM 0.12 MG/1
TABLET ORAL
COMMUNITY
Start: 2023-08-23

## 2023-10-20 RX ORDER — SILDENAFIL 50 MG/1
1 TABLET, FILM COATED ORAL
COMMUNITY
End: 2023-10-20

## 2023-10-20 RX ORDER — IRBESARTAN 150 MG/1
1 TABLET ORAL
COMMUNITY
End: 2023-10-20

## 2023-10-20 RX ORDER — LEVOTHYROXINE SODIUM 0.05 MG/1
1 TABLET ORAL
COMMUNITY
End: 2023-10-20

## 2023-10-20 ASSESSMENT — FIBROSIS 4 INDEX: FIB4 SCORE: 1.770506672551962005

## 2023-10-20 NOTE — PROGRESS NOTES
Chief Complaint   Patient presents with    Other     Biventricular ICD (implantable cardioverter-defibrillator) in place       Subjective     Pavan Cueva is a 73 y.o. male who presents today as heart failure new complex cardiovascular history as status post mitral valve repair, tricuspid repair, maze, MYRNA ligation, PPM (7/13/2018, [facility], paroxysmal A-fib, redo mitral valve replacement (2021, [facility]) cardiac arrest status post CRT-D (12/20/2021, [facility]), hypertension.  Patient is additional medical problems of hypothyroid, ARIANA.  Patient established with Dr. Garza was last seen 7/19/2023 and by Dr. Romero on 9/7/2023.  He has been following with pharmacotherapy clinic.    8/25/2023: Initial 6 minute walk test, patient was able to complete 347 m during 6 minute walk test. his O2 saturation at baseline was 98% and at the end of the test, the O2 saturation was 97%. He reported level 0 of dyspnea on Harshal scale.  Patient was able to walk for 6 minutes.    MLWHF score 24    Today, patient describes NYHA class I functional capacity such as taking care of his chickens and pruning trees in his yard.  Overall, patient feels well, denies chest pain, shortness of breath, palpitations, dizziness/lightheadedness, orthopnea, PND or Edema.  Patient appears euvolemic on exam.    We discussed PYP results in office today and reviewed Dr. Romero's comments regarding cardiac PET.  We will continue to optimize GDMT as tolerated, per below.  Aggressive optimization limited due to blood pressure he will follow-up with heart failure clinic in, 3 months.  We will reevaluate renal electrolyte function in 2 weeks for high risk medication use in 2 weeks.    Past Medical History:   Diagnosis Date    AF (atrial fibrillation) (HCC)          Anemia     AV block, complete (HCC)     Cardiomyopathy, dilated (HCC) 03/2022    Echocardiogram with severely dilated LV,  LVEF 20-25%. Global hypokinesis. Normal RV. Severely dilated LA and RV. MV prothesis with elevated transvalvular gradient. Trace AR, mild TR. RVSP 47mmHg.    Chronic anticoagulation     Heart valve disease     Hemorrhagic disorder (HCC)     History of epistaxis    Hypertension     ARIANA (obstructive sleep apnea)     Renal mass     Restrictive lung disease     Sleep apnea     no cpap, 3L O2    Thyroid disease      Past Surgical History:   Procedure Laterality Date    MITRAL VALVE REPLACEMENT  2021    Redo MVR (#33 Angeli-[place] Theon pericardial valve) by Dr. Chou, [place], [place]    PACEMAKER INSERTION Left 2018    Medtronic Dionne XT DR MRI [id number] implanted by Dr. Gillette, [place], [place]    MAZE PROCEDURE  2018    TRICUSPID VALVE REPAIR  2018    MITRAL VALVE REPAIR  2018     MV repair, TV repair, MYRNA ligation, MAZE procedure and biatrial resizing by Dr. Chou at [place], [place].    MITRAL VALVE REPAIR      OTHER      Nasal surgery for nosebleeds     Family History   Problem Relation Age of Onset    Cancer Mother     Stroke Father         CVA at 85, afib    Heart Disease Father      Social History     Socioeconomic History    Marital status:      Spouse name: Not on file    Number of children: Not on file    Years of education: Not on file    Highest education level: Not on file   Occupational History    Not on file   Tobacco Use    Smoking status: Former     Current packs/day: 0.00     Types: Cigarettes     Quit date: 1970     Years since quittin.8    Smokeless tobacco: Never   Vaping Use    Vaping Use: Never used   Substance and Sexual Activity    Alcohol use: Not Currently    Drug use: Not Currently    Sexual activity: Not on file   Other Topics Concern    Not on file   Social History Narrative    Not on file     Social Determinants of Health     Financial Resource Strain: Not on file   Food Insecurity: Not on file   Transportation Needs: Not on file    Physical Activity: Not on file   Stress: Not on file   Social Connections: Not on file   Intimate Partner Violence: Not on file   Housing Stability: Not on file     No Known Allergies  Outpatient Encounter Medications as of 10/20/2023   Medication Sig Dispense Refill    levothyroxine (SYNTHROID) 125 MCG Tab TAKE 1 TABLET BY MOUTH EVERY MORNING MORE THAN 30 MINUTES BEFORE BREAKFAST.      dapagliflozin propanediol (FARXIGA) 10 MG Tab Take 1 Tablet by mouth every day. 90 Tablet 3    apixaban (ELIQUIS) 5mg Tab Take 1 Tablet by mouth 2 times a day. 180 Tablet 1    Ascorbic Acid (VITAMIN C) 1000 MG Tab Take  by mouth.      sotalol (BETAPACE) 80 MG Tab TAKE 1 TABLET BY MOUTH TWICE DAILY 180 Tablet 3    Probiotic Product (PROBIOTIC-10) Chew Tab Chew 1 Tablet every day.      ferrous sulfate 325 (65 Fe) MG tablet Take 1 Tablet by mouth every 48 hours.      magnesium oxide (MAG-OX) 400 MG Tab tablet Take 1 tablet by mouth every day.      potassium chloride (MICRO-K) 10 MEQ capsule Take 10 mEq by mouth every day. 6 days per week, skips sunday      Multiple Vitamin (MULTI-VITAMINS) Tab Take 1 tablet by mouth every day.      [DISCONTINUED] Fexofenadine HCl (ALLEGRA PO) Take 180 mg by mouth every day. (Patient not taking: Reported on 10/20/2023)      [DISCONTINUED] Multiple Vitamin (MULTI-VITAMIN DAILY PO) Take 1 Capsule by mouth every day.      [DISCONTINUED] ZOLPIDEM TARTRATE PO 1-1/2 tab(s) orally once a day (Patient not taking: Reported on 10/20/2023)      [DISCONTINUED] ALPRAZolam (XANAX) 0.25 MG Tab Take 1 Tablet by mouth 3 times a day. (Patient not taking: Reported on 10/20/2023)      [DISCONTINUED] digoxin (LANOXIN) 0.25 MG/ML Solution 0 intravenously once a day (Patient not taking: Reported on 10/20/2023)      [DISCONTINUED] furosemide (LASIX) 20 MG Tab 1 tab(s) orally 2 times a day for 30 day(s) (Patient not taking: Reported on 10/20/2023)      [DISCONTINUED] irbesartan (AVAPRO) 150 MG Tab Take 1 Tablet by mouth every  "day. (Patient not taking: Reported on 10/20/2023)      [DISCONTINUED] levothyroxine (SYNTHROID) 50 MCG Tab Take 1 Tablet by mouth every day. (Patient not taking: Reported on 10/20/2023)      [DISCONTINUED] metoprolol SR (TOPROL XL) 25 MG TABLET SR 24 HR 1 tab(s) orally 2 times a day for 30 day(s) (Patient not taking: Reported on 10/20/2023)      [DISCONTINUED] sildenafil citrate (VIAGRA) 50 MG tablet Take 1 Tablet by mouth every day. (Patient not taking: Reported on 10/20/2023)      [DISCONTINUED] apixaban (ELIQUIS) 5mg Tab Take 1 Tablet by mouth 2 times a day.      [DISCONTINUED] dapagliflozin propanediol (FARXIGA) 5 MG Tab       [DISCONTINUED] FARXIGA 5 MG Tab TAKE 1 TABLET BY MOUTH EVERY DAY (Patient not taking: Reported on 10/20/2023) 30 Tablet 11    [DISCONTINUED] dapagliflozin propanediol (FARXIGA) 5 MG Tab Take 1 Tablet by mouth every day. 90 Tablet 3    [DISCONTINUED] levothyroxine (SYNTHROID) 112 MCG Tab Take 112 mcg by mouth every morning on an empty stomach. (Patient not taking: Reported on 10/20/2023)       No facility-administered encounter medications on file as of 10/20/2023.     ROS Complete review of systems negative except as noted in HPI/subjective           Objective     /60 (BP Location: Left arm, Patient Position: Sitting, BP Cuff Size: Adult)   Pulse 64   Resp 20   Ht 1.905 m (6' 3\")   Wt 92.9 kg (204 lb 12.8 oz)   SpO2 98%   BMI 25.60 kg/m²     Physical Exam  Vitals reviewed.   Constitutional:       Appearance: Normal appearance. He is well-developed.   HENT:      Head: Normocephalic and atraumatic.   Eyes:      Pupils: Pupils are equal, round, and reactive to light.   Neck:      Vascular: No JVD.   Cardiovascular:      Rate and Rhythm: Normal rate. Rhythm irregular.      Pulses: Normal pulses.      Heart sounds: Normal heart sounds. No murmur heard.     No friction rub. No gallop.   Pulmonary:      Effort: Pulmonary effort is normal. No respiratory distress.      Breath sounds: " Normal breath sounds.   Abdominal:      General: Bowel sounds are normal. There is no distension.      Palpations: Abdomen is soft.   Musculoskeletal:      Right lower leg: No edema.      Left lower leg: No edema.   Skin:     General: Skin is warm and dry.      Findings: No erythema.   Neurological:      General: No focal deficit present.      Mental Status: He is alert and oriented to person, place, and time.   Psychiatric:         Behavior: Behavior normal.       Lab Results   Component Value Date/Time    CHOLSTRLTOT 165 03/01/2022 04:47 AM    CHOLSTRLTOT 149 06/01/2021 08:39 AM    LDL 80 06/01/2021 08:39 AM    HDL 59 03/01/2022 04:47 AM    HDL 63 06/01/2021 08:39 AM    TRIGLYCERIDE 59 03/01/2022 04:47 AM    TRIGLYCERIDE 28 06/01/2021 08:39 AM       Lab Results   Component Value Date/Time    SODIUM 139 05/02/2023 12:12 PM    SODIUM 140 03/01/2022 04:47 AM    SODIUM 137 06/01/2021 08:39 AM    POTASSIUM 4.3 05/02/2023 12:12 PM    POTASSIUM 4.6 03/01/2022 04:47 AM    POTASSIUM 4.7 06/01/2021 08:39 AM    CHLORIDE 107 05/02/2023 12:12 PM    CHLORIDE 100 03/01/2022 04:47 AM    CHLORIDE 102 06/01/2021 08:39 AM    CO2 27 05/02/2023 12:12 PM    CO2 28 03/01/2022 04:47 AM    CO2 28 06/01/2021 08:39 AM    GLUCOSE 74 05/02/2023 12:12 PM    GLUCOSE 95 03/01/2022 04:47 AM    GLUCOSE 91 06/01/2021 08:39 AM    BUN 20 05/02/2023 12:12 PM    BUN 20 03/01/2022 04:47 AM    BUN 22 06/01/2021 08:39 AM    CREATININE 0.96 05/02/2023 12:12 PM    CREATININE 1.13 03/01/2022 04:47 AM    CREATININE 0.85 06/01/2021 08:39 AM    BUNCREATRAT 18 03/01/2022 04:47 AM    GLOMRATE 77 05/02/2023 12:12 PM     Lab Results   Component Value Date/Time    ALKPHOSPHAT 62 05/02/2023 12:12 PM    ALKPHOSPHAT 59 06/01/2021 08:39 AM    ASTSGOT 20 05/02/2023 12:12 PM    ASTSGOT 22 06/01/2021 08:39 AM    ALTSGPT 15 (L) 05/02/2023 12:12 PM    ALTSGPT 20 06/01/2021 08:39 AM    TBILIRUBIN 0.8 05/02/2023 12:12 PM    TBILIRUBIN 1.0 06/01/2021 08:39 AM       ECHOCARDIOGRAM 3/18/2022  Severely reduced left ventricular systolic function.   The left ventricular ejection fraction is visually estimated to be 20-25%.   Grade III diastolic dysfunction (restrictive pattern).  Known mitral valve bioprosthesis with elevated transvalvular gradient.  Estimated right ventricular systolic pressure is 47 mmHg; mild pulmonary hypertension.   No pericardial effusion.    TTE (5/5/2023):  Compared to the prior study on 8/19/22, no significant changes.  Side-  by-side image comparison done.  Severely reduced left ventricular systolic function.  The left ventricular ejection fraction is visually estimated to be 25%.  Global hypokinesis with abnormal septal motion consistent with   ventricular pacing.  Severely dilated left atrium.  Known mitral valve bioprosthesis which is functioning normally with   appropriate transvalvular gradient. Mean transvalvular gradient is 5   mmHg at a heart rate of 69 BPM.  Dilated inferior vena cava without inspiratory collapse.    CARDIAC PET SCAN 9/6/2023   NUCLEAR IMAGING INTERPRETATION   There is a large, fixed moderate to severe defect involving most of the    inferior, inferolateral, and anterolateral walls consistent with prior    transmural infarct.   There is no significant reversibility (SDS = 0).   Moderately reduced left ventricular systolic function.   Normal TID ratio.   Reduced coronary flow reserve of (1.3).   ECG INTERPRETATION   Unable to evaluate due to ventricular paced rhythm.     ECHOCARDIOGRAM 8/19/2022  The left ventricle is mildly dilated.  Severely reduced left ventricular systolic function.  The left ventricular ejection fraction is visually estimated to be 25%.  Grade III diastolic dysfunction (restrictive pattern).  Known mitral valve bioprosthesis which is functioning normally with   appropriate transvalvular gradient.  Mild tricuspid regurgitation.  Right ventricular systolic pressure is estimated to be 60 mmHg.  Pacer/ICD wire  seen in the right ventricle.  Severely dilated left and right atria.  Compared to the images of the prior study 3/18/2022, there has been no   significant change except pulmonary pressures now measure higher.      CARDIAC CATHETERIZATION 8/18/2021 ([facility])  Left ventricular ejection fraction 25-35%  At least moderate mitral regurgitation  Mild to moderate pulmonary hypertension  Normal epicardial coronary arteries, anterior takeoff of right coronary artery    NM-Amyloid PYP (9/21/2023)  IMPRESSION:  1. Finding is equivocal for ATTR cardiac amyloidosis         Assessment & Plan     1. Chronic HFrEF (heart failure with reduced ejection fraction) (HCC)  dapagliflozin propanediol (FARXIGA) 10 MG Tab    Basic Metabolic Panel    proBrain Natriuretic Peptide, NT      2. Atrial fibrillation, permanent (HCC)        3. Nonischemic cardiomyopathy (HCC)        4. Secondary hypercoagulable state (HCC)        5. Chronic anticoagulation        6. S/P mitral valve replacement with bioprosthetic valve        7. Biventricular ICD (implantable cardioverter-defibrillator) in place        8. Biventricular cardiac pacemaker in situ        9. Ventricular tachycardia (HCC)              Medical Decision Making: Today's Assessment/Status/Plan:        Systolic and Diastolic HFrEF, Stage C, Class I, LVEF 25%:  -Heart failure due to nonischemic cardiomyopathy.  Likely multifactorial with valvular and rhythm etiologies.    -Discussed Heart failure trajectory and prognosis with patient. Will continue to optimize medical therapy as tolerated. Advanced HF treatment, need for remote monitoring consideration at every visit.  -ACE-I/ARB/ARNI: Hold at this time due to poor tolerance  -Evidence Based Beta-blocker: Sotalol 80 mg twice daily  -Aldosterone Antagonist: Low threshold to consider  -SGLT2-I: Increase farxiga 10 mg daily  -Diuretic: Euvolemic on exam  -Labs: BMP, light chains. Will continue to closely monitor for side effects of  patient's high risk medication(s) including renal function, NTproBNP, and electrolytes as needed  -Status post CRT-D  -Status post mitral valve repair 2021 for primary MR  -Reinforced s/sx of worsening heart failure with patient and weight monitoring. Pt verbalizes understanding. Pt to call office if present.  -Heart Failure Education: pt to be contacted by HF nurse for further education.  In the meantime, during visit today we discussed indications and use for each medication, importance of treatment adherence, definition of heart failure and potential etiologies for current diagnosis.  -Pharmacotherapy following as needed  -Compliance Barriers none at this time  -Genetic testing consideration none at this time  -Advanced care planning: Defer at this time  -Consideration for LVAD and/or Heart transplant as necessary  -PYP personally reviewed. Mild cardiac uptake, less than ribs. Equivocal, likely negative.    Status post surgical mitral valve replacement, tricuspid valve repair  -Normal gradients on recent echo    Persistent A-fib status post maze, MYRNA ligation  -Followed by Dr. Garza  -Sotalol    Secondary prevention CRT-D  -Normal device function, no events on recent device interrogation on 10/8/2023.   -96% Vpaced    FU in clinic in 3 months with heart failure clinic as well as EP and general cardiology as recommended. Sooner if needed.    Patient verbalizes understanding and agrees with the plan of care.     I personally spent a total of 33 minutes which includes face-to-face time and non-face-to-face time spent on preparing to see the patient, reviewing prior notes and tests, obtaining history from the patient, performing a medically appropriate exam, counseling and educating the patient, ordering medications/tests/procedures/referrals as clinically indicated,  and documenting information in the electronic medical record.    SYD Gallegos.   [facility]  [phone]  [phone]    PLEASE NOTE: This Note was created using voice recognition Software. I have made every reasonable attempt to correct obvious errors, but I expect that there are errors of grammar and possibly content that I did not discover before finalizing the note

## 2023-10-30 ENCOUNTER — PATIENT MESSAGE (OUTPATIENT)
Dept: CARDIOLOGY | Facility: MEDICAL CENTER | Age: 73
End: 2023-10-30
Payer: MEDICARE

## 2023-10-30 DIAGNOSIS — I50.22 CHRONIC HFREF (HEART FAILURE WITH REDUCED EJECTION FRACTION) (HCC): ICD-10-CM

## 2023-10-30 NOTE — TELEPHONE ENCOUNTER
JG- Patient stated after increasing Farxiga, it has lowered BP. Wondering if there is a different medication that won't lower BP?

## 2023-11-02 ENCOUNTER — PATIENT MESSAGE (OUTPATIENT)
Dept: CARDIOLOGY | Facility: MEDICAL CENTER | Age: 73
End: 2023-11-02
Payer: MEDICARE

## 2023-11-02 DIAGNOSIS — I50.22 CHRONIC HFREF (HEART FAILURE WITH REDUCED EJECTION FRACTION) (HCC): ICD-10-CM

## 2023-11-02 RX ORDER — DAPAGLIFLOZIN 10 MG/1
5 TABLET, FILM COATED ORAL DAILY
Qty: 45 TABLET | Refills: 1 | Status: SHIPPED | OUTPATIENT
Start: 2023-11-02 | End: 2023-11-03

## 2023-11-02 NOTE — PATIENT COMMUNICATION
Phone Number Called: [phone]    Call outcome: Spoke to patient regarding message below.    Message: Called to inform patient of ALIZA recommendations. Patient reports he does not take any diuretics at this time. Verbalized understanding of recommendations and ER precautions. No further questions at this time.

## 2023-11-02 NOTE — TELEPHONE ENCOUNTER
Please make sure he is not taking a diuretic as well. Ok to decrease farxiga back down to 5 mg daily

## 2023-11-02 NOTE — TELEPHONE ENCOUNTER
"To: ALIZA    Patient concerned about BP being 92/58. He reports, \"I’m concerned that my blood pressure was stable before at 100 and now it’s in the low 90’s and I’m concerned about that. I have felt a little lightheaded at times.\" He reports the change in blood pressure began after taking Farxiga. Please advise if patient should stop taking Farxiage at this time as JG is OOO. Thank you  "

## 2023-11-03 RX ORDER — DAPAGLIFLOZIN 5 MG/1
5 TABLET, FILM COATED ORAL DAILY
Qty: 90 TABLET | Refills: 2 | Status: SHIPPED | OUTPATIENT
Start: 2023-11-03

## 2023-11-12 ENCOUNTER — PATIENT MESSAGE (OUTPATIENT)
Dept: CARDIOLOGY | Facility: MEDICAL CENTER | Age: 73
End: 2023-11-12
Payer: MEDICARE

## 2023-11-13 ENCOUNTER — PATIENT MESSAGE (OUTPATIENT)
Dept: CARDIOLOGY | Facility: MEDICAL CENTER | Age: 73
End: 2023-11-13
Payer: MEDICARE

## 2023-11-13 ENCOUNTER — TELEPHONE (OUTPATIENT)
Dept: CARDIOLOGY | Facility: MEDICAL CENTER | Age: 73
End: 2023-11-13
Payer: MEDICARE

## 2023-11-13 NOTE — TELEPHONE ENCOUNTER
Records requested from [facility] per patients request for JG to review. Fax confirmation received and sent to [facility] for scanning.      Pending records.

## 2023-11-28 ENCOUNTER — PATIENT MESSAGE (OUTPATIENT)
Dept: CARDIOLOGY | Facility: MEDICAL CENTER | Age: 73
End: 2023-11-28
Payer: MEDICARE

## 2023-11-28 DIAGNOSIS — I50.20 ACC/AHA STAGE C SYSTOLIC HEART FAILURE (HCC): ICD-10-CM

## 2023-12-04 ENCOUNTER — PATIENT MESSAGE (OUTPATIENT)
Dept: CARDIOLOGY | Facility: MEDICAL CENTER | Age: 73
End: 2023-12-04
Payer: MEDICARE

## 2023-12-08 DIAGNOSIS — Z79.899 ENCOUNTER FOR MONITORING SOTALOL THERAPY: ICD-10-CM

## 2023-12-08 DIAGNOSIS — Z51.81 ENCOUNTER FOR MONITORING SOTALOL THERAPY: ICD-10-CM

## 2023-12-08 RX ORDER — SOTALOL HYDROCHLORIDE 80 MG/1
80 TABLET ORAL 2 TIMES DAILY
Qty: 60 TABLET | Refills: 0 | Status: SHIPPED | OUTPATIENT
Start: 2023-12-08 | End: 2023-12-12

## 2023-12-12 DIAGNOSIS — Z51.81 ENCOUNTER FOR MONITORING SOTALOL THERAPY: ICD-10-CM

## 2023-12-12 DIAGNOSIS — Z79.899 ENCOUNTER FOR MONITORING SOTALOL THERAPY: ICD-10-CM

## 2023-12-12 RX ORDER — SOTALOL HYDROCHLORIDE 80 MG/1
TABLET ORAL
Qty: 180 TABLET | Refills: 1 | Status: SHIPPED | OUTPATIENT
Start: 2023-12-12

## 2023-12-12 NOTE — TELEPHONE ENCOUNTER
To: JOSHUA    Please advise on patient's most recent lab results uploaded to media from [facility]. Thank you

## 2023-12-13 NOTE — TELEPHONE ENCOUNTER
To: JG    Patient reports he is concerned about BNP being elevated, but denies and shortness of breath, swelling or weight gain. Please advise on any further recommendations. Thank you!

## 2023-12-14 NOTE — TELEPHONE ENCOUNTER
JERED GallegosP.RИВАН.  YouJust now (3:44 PM)       Because [facility] ran a BNP and not an NT proBNP (which is what I ordered), BNP is likely elevated to the patient initiating Entresto in July with Dr. Garza.  Entresto is known to increase BNP and NT proBNP.  Also mild increase can be associated with A-fib which can also increase BNP levels due to loss of atrial kick.    It does not have bearing on cardiac status as long as patient is feeling well, and result value is not doubling or tripling.     You  JERED [email or web address] minutes ago (3:32 PM)       Hi Candice,  Its the same test. He had one done 5/9/22 and it was 570.  Now it is 657.  If you can advise on the results I will call the patient. He keeps sending different messages regarding this lab result.     Please advise  Thank you!

## 2023-12-14 NOTE — TELEPHONE ENCOUNTER
Please see other encounter.  Routed message to patient in error.    Called and discussed with patient.

## 2023-12-15 ENCOUNTER — TELEPHONE (OUTPATIENT)
Dept: CARDIOLOGY | Facility: MEDICAL CENTER | Age: 73
End: 2023-12-15

## 2023-12-15 NOTE — TELEPHONE ENCOUNTER
JOSHUA    Caller: Pavan Cueva     Topic/issue: Pt called in regards to the [employer] message from yesterday, pt is returning RN call. Please advise.     Callback Number: [phone] (home)      Thank you,     Levar PRIEST

## 2024-01-08 ENCOUNTER — NON-PROVIDER VISIT (OUTPATIENT)
Dept: CARDIOLOGY | Facility: MEDICAL CENTER | Age: 74
End: 2024-01-08
Payer: MEDICARE

## 2024-01-08 PROCEDURE — 93295 DEV INTERROG REMOTE 1/2/MLT: CPT | Performed by: INTERNAL MEDICINE

## 2024-01-08 NOTE — CARDIAC REMOTE MONITOR - SCAN
Device transmission reviewed. Device demonstrated appropriate function.       Electronically Signed by: Johnathan Lopez M.D.    1/9/2024  8:07 AM

## 2024-01-11 ENCOUNTER — PATIENT MESSAGE (OUTPATIENT)
Dept: CARDIOLOGY | Facility: MEDICAL CENTER | Age: 74
End: 2024-01-11
Payer: MEDICARE

## 2024-01-11 DIAGNOSIS — Z79.899 HIGH RISK MEDICATION USE: ICD-10-CM

## 2024-01-11 RX ORDER — POTASSIUM CHLORIDE 750 MG/1
10 CAPSULE, EXTENDED RELEASE ORAL DAILY
Qty: 60 CAPSULE | Refills: 2 | Status: SHIPPED | OUTPATIENT
Start: 2024-01-11 | End: 2024-01-12

## 2024-01-12 ENCOUNTER — ANCILLARY PROCEDURE (OUTPATIENT)
Dept: CARDIOLOGY | Facility: MEDICAL CENTER | Age: 74
End: 2024-01-12
Attending: NURSE PRACTITIONER
Payer: MEDICARE

## 2024-01-12 DIAGNOSIS — I50.20 ACC/AHA STAGE C SYSTOLIC HEART FAILURE (HCC): ICD-10-CM

## 2024-01-12 PROCEDURE — 93306 TTE W/DOPPLER COMPLETE: CPT | Mod: 26 | Performed by: STUDENT IN AN ORGANIZED HEALTH CARE EDUCATION/TRAINING PROGRAM

## 2024-01-12 PROCEDURE — 93306 TTE W/DOPPLER COMPLETE: CPT

## 2024-01-12 RX ORDER — POTASSIUM CHLORIDE 750 MG/1
CAPSULE, EXTENDED RELEASE ORAL
Qty: 90 CAPSULE | Refills: 0 | Status: SHIPPED | OUTPATIENT
Start: 2024-01-12 | End: 2024-02-01

## 2024-01-12 NOTE — PATIENT COMMUNICATION
RN received the following recommendations from JOSHUA:    SYD Gallegos.  You24 minutes ago (4:34 PM)      This fine. Please re-order 10 mEQ potassium daily. BMP in 1 week.    Orders placed. [employer] message sent to patient.

## 2024-01-12 NOTE — TELEPHONE ENCOUNTER
Is the patient due for a refill? Yes    Was the patient seen the past year? Yes    Date of last office visit: 10/20/2023    Does the patient have an upcoming appointment?  Yes   If yes, When? 1/19/2024    Provider to refill:JOSHUA    Does the patients insurance require a 100 day supply?  No

## 2024-01-19 ENCOUNTER — OFFICE VISIT (OUTPATIENT)
Dept: CARDIOLOGY | Facility: MEDICAL CENTER | Age: 74
End: 2024-01-19
Attending: NURSE PRACTITIONER
Payer: MEDICARE

## 2024-01-19 VITALS
BODY MASS INDEX: 25.86 KG/M2 | HEART RATE: 72 BPM | DIASTOLIC BLOOD PRESSURE: 64 MMHG | HEIGHT: 75 IN | RESPIRATION RATE: 12 BRPM | OXYGEN SATURATION: 98 % | SYSTOLIC BLOOD PRESSURE: 108 MMHG | WEIGHT: 208 LBS

## 2024-01-19 DIAGNOSIS — Z98.890 HISTORY OF MITRAL VALVE REPAIR: ICD-10-CM

## 2024-01-19 DIAGNOSIS — I50.22 CHRONIC HFREF (HEART FAILURE WITH REDUCED EJECTION FRACTION) (HCC): ICD-10-CM

## 2024-01-19 DIAGNOSIS — I50.814 BIVENTRICULAR HEART FAILURE WITH REDUCED LEFT VENTRICULAR FUNCTION (HCC): ICD-10-CM

## 2024-01-19 DIAGNOSIS — Z95.3 S/P MITRAL VALVE REPLACEMENT WITH BIOPROSTHETIC VALVE: ICD-10-CM

## 2024-01-19 DIAGNOSIS — I48.21 ATRIAL FIBRILLATION, PERMANENT (HCC): ICD-10-CM

## 2024-01-19 DIAGNOSIS — I42.0 DILATED CARDIOMYOPATHY (HCC): ICD-10-CM

## 2024-01-19 DIAGNOSIS — I42.8 NONISCHEMIC CARDIOMYOPATHY (HCC): ICD-10-CM

## 2024-01-19 DIAGNOSIS — Z95.810 BIVENTRICULAR ICD (IMPLANTABLE CARDIOVERTER-DEFIBRILLATOR) IN PLACE: ICD-10-CM

## 2024-01-19 DIAGNOSIS — Z98.890 HISTORY OF TRICUSPID VALVE REPAIR: ICD-10-CM

## 2024-01-19 DIAGNOSIS — D68.69 SECONDARY HYPERCOAGULABLE STATE (HCC): ICD-10-CM

## 2024-01-19 PROCEDURE — 99213 OFFICE O/P EST LOW 20 MIN: CPT | Performed by: NURSE PRACTITIONER

## 2024-01-19 PROCEDURE — 99214 OFFICE O/P EST MOD 30 MIN: CPT | Performed by: NURSE PRACTITIONER

## 2024-01-19 PROCEDURE — 3074F SYST BP LT 130 MM HG: CPT | Performed by: NURSE PRACTITIONER

## 2024-01-19 PROCEDURE — 3078F DIAST BP <80 MM HG: CPT | Performed by: NURSE PRACTITIONER

## 2024-01-19 RX ORDER — ATORVASTATIN CALCIUM 10 MG/1
10 TABLET, FILM COATED ORAL NIGHTLY
Qty: 90 TABLET | Refills: 3 | Status: SHIPPED | OUTPATIENT
Start: 2024-01-19

## 2024-01-19 ASSESSMENT — FIBROSIS 4 INDEX: FIB4 SCORE: 1.770506672551962005

## 2024-01-19 NOTE — Clinical Note
PYP likely negative, but echo not very reassuring. BUT I could be wrong and am curious about the global hypokinesis is from permanent afib? Kayla feels that he is optimized. I suggested cardiac MRI to rule out additional  infiltrative cardiomyopathies. Let me know what you think! JOSHUA

## 2024-01-19 NOTE — PROGRESS NOTES
Chief Complaint   Patient presents with    Other     F/v DX: Chronic HFrEF (heart failure with reduced ejection fraction) (HCC)       Subjective     Pavan Cueva is a 73 y.o. male who presents today as heart failure follow-up with complex cardiovascular history as status post mitral valve repair, tricuspid repair, maze, MYRNA ligation, PPM (7/13/2018, [facility], paroxysmal A-fib, redo mitral valve replacement (2021, [facility]) cardiac arrest status post CRT-D (12/20/2021, [facility]), hypertension.  Patient is additional medical problems of hypothyroid, ARIANA.  Patient established with Dr. Garza on 7/18/2023, Dr. Romero on 9/7/2023, and was last seen by myself on 10/20/2023.  He has been following with pharmacotherapy clinic.    8/25/2023: Initial 6 minute walk test, patient was able to complete 347 m during 6 minute walk test. his O2 saturation at baseline was 98% and at the end of the test, the O2 saturation was 97%. He reported level 0 of dyspnea on Harshal scale.  Patient was able to walk for 6 minutes.    MLWHF score 24    Today, patient continues to describe NYHA class I functional capacity. Mild dizziness after trials for up titrating medical therapy.  Otherwise, patient denies chest pain, shortness of breath, palpitations, orthopnea, PND, or edema.  Patient remains euvolemic on exam dry weight at 208 pounds.    We discussed echocardiogram results per below.  Patient expresses frustration regarding persistent reduction in EF with negative PYP.  We discussed evaluating for infiltrative cardiomyopathies with cardiac MRI.  Patient agreeable to proceed.  We will reach out to EP APRN for MRI considerations for CRT.  We will also initiate atorvastatin for primary prevention and reevaluate lipids in 3 months.    Past Medical History:   Diagnosis Date    AF (atrial fibrillation) (HCC)          Anemia     AV block, complete (HCC)     Cardiomyopathy,  dilated (HCC) 2022    Echocardiogram with severely dilated LV, LVEF 20-25%. Global hypokinesis. Normal RV. Severely dilated LA and RV. MV prothesis with elevated transvalvular gradient. Trace AR, mild TR. RVSP 47mmHg.    Chronic anticoagulation     Heart valve disease     Hemorrhagic disorder (HCC)     History of epistaxis    Hypertension     ARIANA (obstructive sleep apnea)     Renal mass     Restrictive lung disease     Sleep apnea     no cpap, 3L O2    Thyroid disease      Past Surgical History:   Procedure Laterality Date    MITRAL VALVE REPLACEMENT  2021    Redo MVR (#33 Angeli-[place] Theon pericardial valve) by Dr. Chou, [place], [place]    PACEMAKER INSERTION Left 2018    Medtronic Dionne XT DR MRI [id number] implanted by Dr. Gillette, [place], [place]    MAZE PROCEDURE  2018    TRICUSPID VALVE REPAIR  2018    MITRAL VALVE REPAIR  2018     MV repair, TV repair, MYRNA ligation, MAZE procedure and biatrial resizing by Dr. Chou at [place], [place].    MITRAL VALVE REPAIR      OTHER      Nasal surgery for nosebleeds     Family History   Problem Relation Age of Onset    Cancer Mother     Stroke Father         CVA at 85, afib    Heart Disease Father      Social History     Socioeconomic History    Marital status:      Spouse name: Not on file    Number of children: Not on file    Years of education: Not on file    Highest education level: Not on file   Occupational History    Not on file   Tobacco Use    Smoking status: Former     Current packs/day: 0.00     Types: Cigarettes     Quit date: 1970     Years since quittin.0    Smokeless tobacco: Never   Vaping Use    Vaping Use: Never used   Substance and Sexual Activity    Alcohol use: Not Currently    Drug use: Not Currently    Sexual activity: Not on file   Other Topics Concern    Not on file   Social History Narrative    Not on file     Social Determinants of Health     Financial Resource Strain: Not on file   Food  "Insecurity: Not on file   Transportation Needs: Not on file   Physical Activity: Not on file   Stress: Not on file   Social Connections: Not on file   Intimate Partner Violence: Not on file   Housing Stability: Not on file     No Known Allergies  Outpatient Encounter Medications as of 1/19/2024   Medication Sig Dispense Refill    atorvastatin (LIPITOR) 10 MG Tab Take 1 Tablet by mouth every evening. 90 Tablet 3    potassium chloride (MICRO-K) 10 MEQ capsule TAKE 1 CAPSULE BY MOUTH EVERY DAY FOR 6 DAYS PER WEEK SKIP SUNDAYS 90 Capsule 0    sotalol (BETAPACE) 80 MG Tab TAKE 1 TABLET BY MOUTH TWICE DAILY. TO ENSURE FURTHER REFILLS, PLEASE COMPLETE REQUIRED LAB WORK 180 Tablet 1    dapagliflozin propanediol (FARXIGA) 5 MG Tab Take 1 Tablet by mouth every day. 90 Tablet 2    levothyroxine (SYNTHROID) 125 MCG Tab TAKE 1 TABLET BY MOUTH EVERY MORNING MORE THAN 30 MINUTES BEFORE BREAKFAST.      apixaban (ELIQUIS) 5mg Tab Take 1 Tablet by mouth 2 times a day. 180 Tablet 1    Ascorbic Acid (VITAMIN C) 1000 MG Tab Take  by mouth.      Probiotic Product (PROBIOTIC-10) Chew Tab Chew 1 Tablet every day.      ferrous sulfate 325 (65 Fe) MG tablet Take 1 Tablet by mouth every 48 hours.      magnesium oxide (MAG-OX) 400 MG Tab tablet Take 1 tablet by mouth every day.      Multiple Vitamin (MULTI-VITAMINS) Tab Take 1 tablet by mouth every day.       No facility-administered encounter medications on file as of 1/19/2024.     ROS Complete review of systems negative except as noted in HPI/subjective           Objective     /64 (BP Location: Left arm, Patient Position: Sitting, BP Cuff Size: Adult)   Pulse 72   Resp 12   Ht 1.905 m (6' 3\")   Wt 94.3 kg (208 lb)   SpO2 98%   BMI 26.00 kg/m²     Physical Exam  Vitals reviewed.   Constitutional:       Appearance: Normal appearance. He is well-developed.   HENT:      Head: Normocephalic and atraumatic.   Eyes:      Pupils: Pupils are equal, round, and reactive to light.   Neck: "      Vascular: No JVD.   Cardiovascular:      Rate and Rhythm: Normal rate. Rhythm irregular.      Pulses: Normal pulses.      Heart sounds: Normal heart sounds. No murmur heard.     No friction rub. No gallop.   Pulmonary:      Effort: Pulmonary effort is normal. No respiratory distress.      Breath sounds: Normal breath sounds.   Abdominal:      General: Bowel sounds are normal. There is no distension.      Palpations: Abdomen is soft.   Musculoskeletal:      Right lower leg: No edema.      Left lower leg: No edema.   Skin:     General: Skin is warm and dry.      Findings: No erythema.   Neurological:      General: No focal deficit present.      Mental Status: He is alert and oriented to person, place, and time.   Psychiatric:         Behavior: Behavior normal.       Lab Results   Component Value Date/Time    CHOLSTRLTOT 165 03/01/2022 04:47 AM    CHOLSTRLTOT 149 06/01/2021 08:39 AM    LDL 80 06/01/2021 08:39 AM    HDL 59 03/01/2022 04:47 AM    HDL 63 06/01/2021 08:39 AM    TRIGLYCERIDE 59 03/01/2022 04:47 AM    TRIGLYCERIDE 28 06/01/2021 08:39 AM       Lab Results   Component Value Date/Time    SODIUM 139 05/02/2023 12:12 PM    SODIUM 140 03/01/2022 04:47 AM    SODIUM 137 06/01/2021 08:39 AM    POTASSIUM 4.3 05/02/2023 12:12 PM    POTASSIUM 4.6 03/01/2022 04:47 AM    POTASSIUM 4.7 06/01/2021 08:39 AM    CHLORIDE 107 05/02/2023 12:12 PM    CHLORIDE 100 03/01/2022 04:47 AM    CHLORIDE 102 06/01/2021 08:39 AM    CO2 27 05/02/2023 12:12 PM    CO2 28 03/01/2022 04:47 AM    CO2 28 06/01/2021 08:39 AM    GLUCOSE 74 05/02/2023 12:12 PM    GLUCOSE 95 03/01/2022 04:47 AM    GLUCOSE 91 06/01/2021 08:39 AM    BUN 20 05/02/2023 12:12 PM    BUN 20 03/01/2022 04:47 AM    BUN 22 06/01/2021 08:39 AM    CREATININE 0.96 05/02/2023 12:12 PM    CREATININE 1.13 03/01/2022 04:47 AM    CREATININE 0.85 06/01/2021 08:39 AM    BUNCREATRAT 18 03/01/2022 04:47 AM    GLOMRATE 77 05/02/2023 12:12 PM     Lab Results   Component Value Date/Time     ALKPHOSPHAT 62 05/02/2023 12:12 PM    ALKPHOSPHAT 59 06/01/2021 08:39 AM    ASTSGOT 20 05/02/2023 12:12 PM    ASTSGOT 22 06/01/2021 08:39 AM    ALTSGPT 15 (L) 05/02/2023 12:12 PM    ALTSGPT 20 06/01/2021 08:39 AM    TBILIRUBIN 0.8 05/02/2023 12:12 PM    TBILIRUBIN 1.0 06/01/2021 08:39 AM      BMP (11/10/2023): Reviewed in media  BNP (11/10/2023): 657 while euvolemic     Latest Reference Range & Units 06/01/21 08:39   Iron 50 - 180 ug/dL 135     ECHOCARDIOGRAM 3/18/2022  Severely reduced left ventricular systolic function.   The left ventricular ejection fraction is visually estimated to be 20-25%.   Grade III diastolic dysfunction (restrictive pattern).  Known mitral valve bioprosthesis with elevated transvalvular gradient.  Estimated right ventricular systolic pressure is 47 mmHg; mild pulmonary hypertension.   No pericardial effusion.    TTE (5/5/2023):  Compared to the prior study on 8/19/22, no significant changes.  Side-  by-side image comparison done.  Severely reduced left ventricular systolic function.  The left ventricular ejection fraction is visually estimated to be 25%.  Global hypokinesis with abnormal septal motion consistent with   ventricular pacing.  Severely dilated left atrium.  Known mitral valve bioprosthesis which is functioning normally with   appropriate transvalvular gradient. Mean transvalvular gradient is 5   mmHg at a heart rate of 69 BPM.  Dilated inferior vena cava without inspiratory collapse.    CARDIAC PET SCAN 9/6/2023   NUCLEAR IMAGING INTERPRETATION   There is a large, fixed moderate to severe defect involving most of the    inferior, inferolateral, and anterolateral walls consistent with prior    transmural infarct.   There is no significant reversibility (SDS = 0).   Moderately reduced left ventricular systolic function.   Normal TID ratio.   Reduced coronary flow reserve of (1.3).   ECG INTERPRETATION   Unable to evaluate due to ventricular paced rhythm.     ECHOCARDIOGRAM  8/19/2022  The left ventricle is mildly dilated.  Severely reduced left ventricular systolic function.  The left ventricular ejection fraction is visually estimated to be 25%.  Grade III diastolic dysfunction (restrictive pattern).  Known mitral valve bioprosthesis which is functioning normally with   appropriate transvalvular gradient.  Mild tricuspid regurgitation.  Right ventricular systolic pressure is estimated to be 60 mmHg.  Pacer/ICD wire seen in the right ventricle.  Severely dilated left and right atria.  Compared to the images of the prior study 3/18/2022, there has been no   significant change except pulmonary pressures now measure higher.      CARDIAC CATHETERIZATION 8/18/2021 ([facility])  Left ventricular ejection fraction 25-35%  At least moderate mitral regurgitation  Mild to moderate pulmonary hypertension  Normal epicardial coronary arteries, anterior takeoff of right coronary artery    NM-Amyloid PYP (9/21/2023)  IMPRESSION:  1. Finding is equivocal for ATTR cardiac amyloidosis    TTE (1/12/2024):  Severely reduced left ventricular systolic function. The left   ventricular ejection fraction is visually estimated to be 20-25%.   Global hypokinesis.   The right ventricle is dilated. Reduced right ventricular systolic   function.   Biatrial enlargement.   Known mitral valve bioprosthesis which is functioning normally with   appropriate transvalvular gradient.  Right ventricular systolic pressure is estimated to be 25 mmHg   (normal).   Compared to the prior study on 5/5/23, there are no significant   changes.           Assessment & Plan     1. Biventricular ICD (implantable cardioverter-defibrillator) in place  MR-CARDIAC MORPH/FUNC WITH & W/O    atorvastatin (LIPITOR) 10 MG Tab    Lipid Profile      2. Atrial fibrillation, permanent (HCC)  MR-CARDIAC MORPH/FUNC WITH & W/O    atorvastatin (LIPITOR) 10 MG Tab    Lipid Profile      3. Chronic HFrEF (heart failure with reduced ejection  fraction) (HCC)  MR-CARDIAC MORPH/FUNC WITH & W/O    atorvastatin (LIPITOR) 10 MG Tab    Lipid Profile      4. Dilated cardiomyopathy (HCC)  MR-CARDIAC MORPH/FUNC WITH & W/O    atorvastatin (LIPITOR) 10 MG Tab    Lipid Profile      5. History of mitral valve repair  MR-CARDIAC MORPH/FUNC WITH & W/O    atorvastatin (LIPITOR) 10 MG Tab    Lipid Profile      6. History of tricuspid valve repair  MR-CARDIAC MORPH/FUNC WITH & W/O    atorvastatin (LIPITOR) 10 MG Tab    Lipid Profile      7. Nonischemic cardiomyopathy (HCC)  MR-CARDIAC MORPH/FUNC WITH & W/O    atorvastatin (LIPITOR) 10 MG Tab    Lipid Profile      8. S/P mitral valve replacement with bioprosthetic valve  MR-CARDIAC MORPH/FUNC WITH & W/O    atorvastatin (LIPITOR) 10 MG Tab    Lipid Profile      9. Secondary hypercoagulable state (HCC)  MR-CARDIAC MORPH/FUNC WITH & W/O    atorvastatin (LIPITOR) 10 MG Tab    Lipid Profile      10. Biventricular heart failure with reduced left ventricular function (HCC)  MR-CARDIAC MORPH/FUNC WITH & W/O    atorvastatin (LIPITOR) 10 MG Tab    Lipid Profile            Medical Decision Making: Today's Assessment/Status/Plan:        Systolic and Diastolic HFrEF, Stage C, Class I, LVEF 25%:  -Heart failure due to nonischemic cardiomyopathy.  Likely multifactorial with valvular and rhythm etiologies.  Refractory to medical therapy with previous improvement to 40%.  Patient agreeable to proceed with cardiac MRI for infiltrative cardiomyopathies consideration  -Persistently elevated BNP.  Hx VT/VF cardiac arrest, no Hx of cardiac MRI  -Discussed Heart failure trajectory and prognosis with patient. Will continue to optimize medical therapy as tolerated. Advanced HF treatment, need for remote monitoring consideration at every visit.  -ACE-I/ARB/ARNI: Hold at this time due to poor tolerance in the past  -Evidence Based Beta-blocker: Sotalol 80 mg twice daily  -Aldosterone Antagonist: Low threshold to consider  -SGLT2-I: Continue farxiga  10 mg daily  -Diuretic: Euvolemic on exam  -Labs: Will continue to closely monitor for side effects of patient's high risk medication(s) including renal function, NTproBNP, and electrolytes as needed  -Status post CRT-D  -Status post mitral valve repair 2021 for primary MR  -Reinforced s/sx of worsening heart failure with patient and weight monitoring. Pt verbalizes understanding. Pt to call office if present.  -Heart Failure Education: pt to be contacted by HF nurse for further education.  In the meantime, during visit today we discussed indications and use for each medication, importance of treatment adherence, definition of heart failure and potential etiologies for current diagnosis.  -Pharmacotherapy following as needed  -Compliance Barriers none at this time  -Genetic testing consideration none at this time  -Advanced care planning: Defer at this time  -Consideration for LVAD and/or Heart transplant as necessary  -PYP personally reviewed. Mild cardiac uptake, less than ribs. Equivocal, likely negative.  -Add statin. FU lipids    Status post surgical mitral valve replacement, tricuspid valve repair  -Normal gradients on recent echo    Persistent A-fib status post maze, MYRNA ligation  -Followed by Dr. Garza  -Sotalol    V-fib/VT cardiac arrest; secondary prevention CRT-D  -Normal device function, no events on recent device interrogation on 1/8/2024.   -94% Vpaced  -Cardiac MRI ordered.  Reached out to EP APRN to discuss feasibility.    FU in clinic in 3 months with heart failure clinic as well as EP and general cardiology as recommended. Sooner if needed.    Patient verbalizes understanding and agrees with the plan of care.     I personally spent a total of 32 minutes which includes face-to-face time and non-face-to-face time spent on preparing to see the patient, reviewing prior notes and tests, obtaining history from the patient, performing a medically appropriate exam, counseling and educating the patient,  ordering medications/tests/procedures/referrals as clinically indicated,  and documenting information in the electronic medical record.    SYD Gallegos.   [facility]  [phone]    PLEASE NOTE: This Note was created using voice recognition Software. I have made every reasonable attempt to correct obvious errors, but I expect that there are errors of grammar and possibly content that I did not discover before finalizing the note

## 2024-01-26 ENCOUNTER — TELEPHONE (OUTPATIENT)
Dept: CARDIOLOGY | Facility: MEDICAL CENTER | Age: 74
End: 2024-01-26
Payer: MEDICARE

## 2024-01-26 NOTE — TELEPHONE ENCOUNTER
----- Message from MAKEDA Gallegos sent at 1/26/2024 10:03 AM PST -----  Can you let patient know that I reviewed his device and leads with the device rep?   His original pacemaker lead is not MRI compatible, so we are unable to proceed with cardiac MRI at this time  ----- Message -----  From: MAKEDA Gallegos  Sent: 1/23/2024   4:53 PM PST  To: MAKEDA Gallegos    Reach out to device rep regarding cardiac MRI safety.

## 2024-02-01 ENCOUNTER — TELEPHONE (OUTPATIENT)
Dept: CARDIOLOGY | Facility: MEDICAL CENTER | Age: 74
End: 2024-02-01
Payer: MEDICARE

## 2024-02-01 DIAGNOSIS — I42.0 DILATED CARDIOMYOPATHY (HCC): ICD-10-CM

## 2024-02-01 DIAGNOSIS — I48.21 ATRIAL FIBRILLATION, PERMANENT (HCC): ICD-10-CM

## 2024-02-01 DIAGNOSIS — I50.22 CHRONIC HFREF (HEART FAILURE WITH REDUCED EJECTION FRACTION) (HCC): ICD-10-CM

## 2024-02-01 DIAGNOSIS — Z95.810 BIVENTRICULAR ICD (IMPLANTABLE CARDIOVERTER-DEFIBRILLATOR) IN PLACE: ICD-10-CM

## 2024-02-01 RX ORDER — SPIRONOLACTONE 25 MG/1
25 TABLET ORAL DAILY
Qty: 90 TABLET | Refills: 1 | Status: SHIPPED | OUTPATIENT
Start: 2024-02-01

## 2024-02-01 NOTE — TELEPHONE ENCOUNTER
JOSHUA    Caller: Pavan Cueva     Topic/issue: pt is calling in stating he was to request JOSHUA directly. Please reach out to him     Callback Number: [phone] (home)      Thank you     Nurys HERNANDEZ

## 2024-02-01 NOTE — TELEPHONE ENCOUNTER
Discussed echocardiogram results.    Will add spironolactone with BMP in 2 weeks.    Patient to follow-up as scheduled.

## 2024-03-19 DIAGNOSIS — Z79.899 HIGH RISK MEDICATION USE: ICD-10-CM

## 2024-03-19 RX ORDER — POTASSIUM CHLORIDE 750 MG/1
CAPSULE, EXTENDED RELEASE ORAL
Qty: 77 CAPSULE | Refills: 1 | Status: SHIPPED | OUTPATIENT
Start: 2024-03-19

## 2024-04-04 DIAGNOSIS — Z79.899 ENCOUNTER FOR MONITORING SOTALOL THERAPY: ICD-10-CM

## 2024-04-04 DIAGNOSIS — Z79.899 HIGH RISK MEDICATION USE: ICD-10-CM

## 2024-04-04 DIAGNOSIS — Z51.81 ENCOUNTER FOR MONITORING SOTALOL THERAPY: ICD-10-CM

## 2024-04-04 RX ORDER — SOTALOL HYDROCHLORIDE 80 MG/1
TABLET ORAL
Qty: 180 TABLET | Refills: 1 | Status: SHIPPED | OUTPATIENT
Start: 2024-04-04

## 2024-04-04 NOTE — TELEPHONE ENCOUNTER
OV notes reviewed; CMP, EKG up-to-date. Pt is due for mag with next set of labs. Mag order added. Called pt and updated.

## 2024-04-11 DIAGNOSIS — I48.21 ATRIAL FIBRILLATION, PERMANENT (HCC): ICD-10-CM

## 2024-04-11 RX ORDER — APIXABAN 5 MG/1
5 TABLET, FILM COATED ORAL 2 TIMES DAILY
Qty: 180 TABLET | Refills: 1 | Status: SHIPPED | OUTPATIENT
Start: 2024-04-11

## 2024-04-18 ENCOUNTER — NON-PROVIDER VISIT (OUTPATIENT)
Dept: CARDIOLOGY | Facility: MEDICAL CENTER | Age: 74
End: 2024-04-18
Payer: MEDICARE

## 2024-04-19 PROCEDURE — 93295 DEV INTERROG REMOTE 1/2/MLT: CPT | Performed by: INTERNAL MEDICINE

## 2024-04-26 ENCOUNTER — OFFICE VISIT (OUTPATIENT)
Dept: CARDIOLOGY | Facility: MEDICAL CENTER | Age: 74
End: 2024-04-26
Attending: NURSE PRACTITIONER
Payer: MEDICARE

## 2024-04-26 VITALS
BODY MASS INDEX: 24.62 KG/M2 | OXYGEN SATURATION: 95 % | HEART RATE: 71 BPM | RESPIRATION RATE: 18 BRPM | DIASTOLIC BLOOD PRESSURE: 72 MMHG | WEIGHT: 198 LBS | SYSTOLIC BLOOD PRESSURE: 110 MMHG | HEIGHT: 75 IN

## 2024-04-26 DIAGNOSIS — Z95.810 BIVENTRICULAR ICD (IMPLANTABLE CARDIOVERTER-DEFIBRILLATOR) IN PLACE: ICD-10-CM

## 2024-04-26 DIAGNOSIS — I50.22 CHF NYHA CLASS I (NO SYMPTOMS FROM ORDINARY ACTIVITIES), CHRONIC, SYSTOLIC (HCC): ICD-10-CM

## 2024-04-26 DIAGNOSIS — Z79.01 CHRONIC ANTICOAGULATION: ICD-10-CM

## 2024-04-26 DIAGNOSIS — I48.21 HYPERCOAGULABLE STATE DUE TO PERMANENT ATRIAL FIBRILLATION (HCC): ICD-10-CM

## 2024-04-26 DIAGNOSIS — I48.21 ATRIAL FIBRILLATION, PERMANENT (HCC): ICD-10-CM

## 2024-04-26 DIAGNOSIS — I42.8 NONISCHEMIC CARDIOMYOPATHY (HCC): ICD-10-CM

## 2024-04-26 DIAGNOSIS — Z95.3 S/P MITRAL VALVE REPLACEMENT WITH BIOPROSTHETIC VALVE: ICD-10-CM

## 2024-04-26 DIAGNOSIS — D68.69 HYPERCOAGULABLE STATE DUE TO PERMANENT ATRIAL FIBRILLATION (HCC): ICD-10-CM

## 2024-04-26 DIAGNOSIS — I42.0 DILATED CARDIOMYOPATHY (HCC): ICD-10-CM

## 2024-04-26 DIAGNOSIS — I50.22 CHRONIC HFREF (HEART FAILURE WITH REDUCED EJECTION FRACTION) (HCC): ICD-10-CM

## 2024-04-26 PROCEDURE — 99214 OFFICE O/P EST MOD 30 MIN: CPT | Performed by: NURSE PRACTITIONER

## 2024-04-26 ASSESSMENT — MINNESOTA LIVING WITH HEART FAILURE QUESTIONNAIRE (MLHF)
MAKING YOU STAY IN A HOSPITAL: 0
DIFFICULTY GOING AWAY FROM HOME: 0
DIFFICULTY WORKING TO EARN A LIVING: 0
MAKING YOU FEEL DEPRESSED: 1
MAKING YOU SHORT OF BREATH: 1
WALKING ABOUT OR CLIMBING STAIRS DIFFICULT: 1
HAVING TO SIT OR LIE DOWN DURING THE DAY: 0
COSTING YOU MONEY FOR MEDICAL CARE: 1
EATING LESS FOODS YOU LIKE: 0
DIFFICULTY TO CONCENTRATE OR REMEMBERING THINGS: 2
DIFFICULTY SOCIALIZING WITH FAMILY OR FRIENDS: 1
GIVING YOU SIDE EFFECTS FROM TREATMENTS: 1
FEELING LIKE A BURDEN TO FAMILY AND FRIENDS: 0
MAKING YOU WORRY: 1
WORKING AROUND THE HOUSE OR YARD DIFFICULT: 1
LOSS OF SELF CONTROL IN YOUR LIFE: 1
DIFFICULTY SLEEPING WELL AT NIGHT: 1
SWELLING IN ANKLES OR LEGS: 0
TOTAL_SCORE: 17
TIRED, FATIGUED OR LOW ON ENERGY: 1
DIFFICULTY WITH SEXUAL ACTIVITIES: 2
DIFFICULTY WITH RECREATIONAL PASTIMES, SPORTS, HOBBIES: 2

## 2024-04-26 ASSESSMENT — FIBROSIS 4 INDEX: FIB4 SCORE: 1.795097043004072588

## 2024-04-26 NOTE — PROGRESS NOTES
No chief complaint on file.  Heart failure follow-up    Subjective     Pavan Cueva is a 73 y.o. male who presents today as heart failure follow-up with complex cardiovascular history as status post mitral valve repair, tricuspid repair, maze, MYRNA ligation, PPM (7/13/2018, [facility], paroxysmal A-fib, redo mitral valve replacement (2021, [facility]) cardiac arrest status post CRT-D (12/20/2021, [facility]), hypertension.  Patient is additional medical problems of hypothyroid, ARIANA, renal mass ~2cm.  Patient established with Dr. Garza on 7/18/2023, Dr. Romero on 9/7/2023, and was last seen by myself on 1/19/2024.  He has been following with pharmacotherapy clinic.    8/25/2023: Initial 6 minute walk test, patient was able to complete 347 m during 6 minute walk test. his O2 saturation at baseline was 98% and at the end of the test, the O2 saturation was 97%. He reported level 0 of dyspnea on Harshal scale.  Patient was able to walk for 6 minutes.    MLWHF score 24    Today, Patient feels well, denies chest pain, shortness of breath, palpitations, dizziness/lightheadedness, orthopnea, PND or Edema.  Patient tolerating medical therapy.  Denies exacerbation of symptoms with physical activity.  Remains NYHA class I functional capacity.  Patient remains euvolemic on exam.  Patient did not initiate statin therapy.    Will continue medical therapy as previously prescribed.  We can reevaluate coronary arteries in the next 1 to 2 years, sooner if symptoms present.  Patient agreeable to defer at this time due to asymptomatic.  Labs reviewed in office today per below.  Most recent device interrogation on 4/18/2024 reviewed in office today and discussed with patient.  Patient to follow-up with cardiology in 6 months, sooner if needed.      Past Medical History:   Diagnosis Date    AF (atrial fibrillation) (HCC)          Anemia     AV block, complete (HCC)      Cardiomyopathy, dilated (HCC) 2022    Echocardiogram with severely dilated LV, LVEF 20-25%. Global hypokinesis. Normal RV. Severely dilated LA and RV. MV prothesis with elevated transvalvular gradient. Trace AR, mild TR. RVSP 47mmHg.    Chronic anticoagulation     Heart valve disease     Hemorrhagic disorder (HCC)     History of epistaxis    Hypertension     ARIANA (obstructive sleep apnea)     Renal mass     Restrictive lung disease     Sleep apnea     no cpap, 3L O2    Thyroid disease      Past Surgical History:   Procedure Laterality Date    MITRAL VALVE REPLACEMENT  2021    Redo MVR (#33 Angeli-[place] Theon pericardial valve) by Dr. Chou, [place], [place]    PACEMAKER INSERTION Left 2018    Medtronic Dionne XT DR MRI [id number] implanted by Dr. Gillette, [place], [place]    MAZE PROCEDURE  2018    TRICUSPID VALVE REPAIR  2018    MITRAL VALVE REPAIR  2018     MV repair, TV repair, MYRNA ligation, MAZE procedure and biatrial resizing by Dr. Chou at [place], [place].    MITRAL VALVE REPAIR      OTHER      Nasal surgery for nosebleeds     Family History   Problem Relation Age of Onset    Cancer Mother     Stroke Father         CVA at 85, afib    Heart Disease Father      Social History     Socioeconomic History    Marital status:      Spouse name: Not on file    Number of children: Not on file    Years of education: Not on file    Highest education level: Not on file   Occupational History    Not on file   Tobacco Use    Smoking status: Former     Current packs/day: 0.00     Types: Cigarettes     Quit date: 1970     Years since quittin.3    Smokeless tobacco: Never   Vaping Use    Vaping Use: Never used   Substance and Sexual Activity    Alcohol use: Not Currently    Drug use: Not Currently    Sexual activity: Not on file   Other Topics Concern    Not on file   Social History Narrative    Not on file     Social Determinants of Health     Financial Resource Strain: Not  "on file   Food Insecurity: Not on file   Transportation Needs: Not on file   Physical Activity: Not on file   Stress: Not on file   Social Connections: Not on file   Intimate Partner Violence: Not on file   Housing Stability: Not on file     No Known Allergies  Outpatient Encounter Medications as of 4/26/2024   Medication Sig Dispense Refill    ELIQUIS 5 MG Tab TAKE 1 TABLET BY MOUTH TWICE DAILY 180 Tablet 1    sotalol (BETAPACE) 80 MG Tab TAKE 1 TABLET BY MOUTH TWICE DAILY. PLEASE COMPLETE LAB WORK. 180 Tablet 1    potassium chloride (MICRO-K) 10 MEQ capsule TAKE 1 CAPSULE BY MOUTH EVERY DAY FOR 6 DAYS PER WEEK SKIP SUNDAYS 77 Capsule 1    spironolactone (ALDACTONE) 25 MG Tab Take 1 Tablet by mouth every day. 90 Tablet 1    dapagliflozin propanediol (FARXIGA) 5 MG Tab Take 1 Tablet by mouth every day. 90 Tablet 2    levothyroxine (SYNTHROID) 125 MCG Tab TAKE 1 TABLET BY MOUTH EVERY MORNING MORE THAN 30 MINUTES BEFORE BREAKFAST.      Ascorbic Acid (VITAMIN C) 1000 MG Tab Take  by mouth.      Probiotic Product (PROBIOTIC-10) Chew Tab Chew 1 Tablet every day.      ferrous sulfate 325 (65 Fe) MG tablet Take 1 Tablet by mouth every 48 hours.      magnesium oxide (MAG-OX) 400 MG Tab tablet Take 1 tablet by mouth every day.      Multiple Vitamin (MULTI-VITAMINS) Tab Take 1 tablet by mouth every day.      [DISCONTINUED] atorvastatin (LIPITOR) 10 MG Tab Take 1 Tablet by mouth every evening. (Patient not taking: Reported on 4/26/2024) 90 Tablet 3     No facility-administered encounter medications on file as of 4/26/2024.     ROS Complete review of systems negative except as noted in HPI/subjective           Objective     /72 (BP Location: Left arm, Patient Position: Sitting, BP Cuff Size: Adult)   Pulse 71   Resp 18   Ht 1.905 m (6' 3\")   Wt 89.8 kg (198 lb)   SpO2 95%   BMI 24.75 kg/m²     Physical Exam  Vitals reviewed.   Constitutional:       Appearance: Normal appearance. He is well-developed.   HENT:      " Head: Normocephalic and atraumatic.   Eyes:      Pupils: Pupils are equal, round, and reactive to light.   Neck:      Vascular: No JVD.   Cardiovascular:      Rate and Rhythm: Normal rate and regular rhythm.      Pulses: Normal pulses.      Heart sounds: Normal heart sounds. No murmur heard.     No friction rub. No gallop.   Pulmonary:      Effort: Pulmonary effort is normal. No respiratory distress.      Breath sounds: Normal breath sounds.   Abdominal:      General: Bowel sounds are normal. There is no distension.      Palpations: Abdomen is soft.   Musculoskeletal:      Right lower leg: No edema.      Left lower leg: No edema.   Skin:     General: Skin is warm and dry.      Findings: No erythema.   Neurological:      General: No focal deficit present.      Mental Status: He is alert and oriented to person, place, and time.   Psychiatric:         Behavior: Behavior normal.       Lab Results   Component Value Date/Time    CHOLSTRLTOT 165 03/01/2022 04:47 AM    CHOLSTRLTOT 149 06/01/2021 08:39 AM    LDL 80 06/01/2021 08:39 AM    HDL 59 03/01/2022 04:47 AM    HDL 63 06/01/2021 08:39 AM    TRIGLYCERIDE 59 03/01/2022 04:47 AM    TRIGLYCERIDE 28 06/01/2021 08:39 AM       Lab Results   Component Value Date/Time    SODIUM 139 05/02/2023 12:12 PM    SODIUM 140 03/01/2022 04:47 AM    SODIUM 137 06/01/2021 08:39 AM    POTASSIUM 4.3 05/02/2023 12:12 PM    POTASSIUM 4.6 03/01/2022 04:47 AM    POTASSIUM 4.7 06/01/2021 08:39 AM    CHLORIDE 107 05/02/2023 12:12 PM    CHLORIDE 100 03/01/2022 04:47 AM    CHLORIDE 102 06/01/2021 08:39 AM    CO2 27 05/02/2023 12:12 PM    CO2 28 03/01/2022 04:47 AM    CO2 28 06/01/2021 08:39 AM    GLUCOSE 74 05/02/2023 12:12 PM    GLUCOSE 95 03/01/2022 04:47 AM    GLUCOSE 91 06/01/2021 08:39 AM    BUN 20 05/02/2023 12:12 PM    BUN 20 03/01/2022 04:47 AM    BUN 22 06/01/2021 08:39 AM    CREATININE 0.96 05/02/2023 12:12 PM    CREATININE 1.13 03/01/2022 04:47 AM    CREATININE 0.85 06/01/2021 08:39 AM     BUNCREATRAT 18 03/01/2022 04:47 AM    GLOMRATE 77 05/02/2023 12:12 PM     Lab Results   Component Value Date/Time    ALKPHOSPHAT 62 05/02/2023 12:12 PM    ALKPHOSPHAT 59 06/01/2021 08:39 AM    ASTSGOT 20 05/02/2023 12:12 PM    ASTSGOT 22 06/01/2021 08:39 AM    ALTSGPT 15 (L) 05/02/2023 12:12 PM    ALTSGPT 20 06/01/2021 08:39 AM    TBILIRUBIN 0.8 05/02/2023 12:12 PM    TBILIRUBIN 1.0 06/01/2021 08:39 AM      BMP (11/10/2023): Reviewed in media  BNP (11/10/2023): 657 while euvolemic     Latest Reference Range & Units 06/01/21 08:39   Iron 50 - 180 ug/dL 135     ECHOCARDIOGRAM 3/18/2022  Severely reduced left ventricular systolic function.   The left ventricular ejection fraction is visually estimated to be 20-25%.   Grade III diastolic dysfunction (restrictive pattern).  Known mitral valve bioprosthesis with elevated transvalvular gradient.  Estimated right ventricular systolic pressure is 47 mmHg; mild pulmonary hypertension.   No pericardial effusion.    TTE (5/5/2023):  Compared to the prior study on 8/19/22, no significant changes.  Side-  by-side image comparison done.  Severely reduced left ventricular systolic function.  The left ventricular ejection fraction is visually estimated to be 25%.  Global hypokinesis with abnormal septal motion consistent with   ventricular pacing.  Severely dilated left atrium.  Known mitral valve bioprosthesis which is functioning normally with   appropriate transvalvular gradient. Mean transvalvular gradient is 5   mmHg at a heart rate of 69 BPM.  Dilated inferior vena cava without inspiratory collapse.    CARDIAC PET SCAN 9/6/2023   NUCLEAR IMAGING INTERPRETATION   There is a large, fixed moderate to severe defect involving most of the    inferior, inferolateral, and anterolateral walls consistent with prior    transmural infarct.   There is no significant reversibility (SDS = 0).   Moderately reduced left ventricular systolic function.   Normal TID ratio.   Reduced coronary flow  reserve of (1.3).   ECG INTERPRETATION   Unable to evaluate due to ventricular paced rhythm.     ECHOCARDIOGRAM 8/19/2022  The left ventricle is mildly dilated.  Severely reduced left ventricular systolic function.  The left ventricular ejection fraction is visually estimated to be 25%.  Grade III diastolic dysfunction (restrictive pattern).  Known mitral valve bioprosthesis which is functioning normally with   appropriate transvalvular gradient.  Mild tricuspid regurgitation.  Right ventricular systolic pressure is estimated to be 60 mmHg.  Pacer/ICD wire seen in the right ventricle.  Severely dilated left and right atria.  Compared to the images of the prior study 3/18/2022, there has been no   significant change except pulmonary pressures now measure higher.      CARDIAC CATHETERIZATION 8/18/2021 ([facility])  Left ventricular ejection fraction 25-35%  At least moderate mitral regurgitation  Mild to moderate pulmonary hypertension  Normal epicardial coronary arteries, anterior takeoff of right coronary artery    NM-Amyloid PYP (9/21/2023)  IMPRESSION:  1. Finding is equivocal for ATTR cardiac amyloidosis    TTE (1/12/2024):  Severely reduced left ventricular systolic function. The left   ventricular ejection fraction is visually estimated to be 20-25%.   Global hypokinesis.   The right ventricle is dilated. Reduced right ventricular systolic   function.   Biatrial enlargement.   Known mitral valve bioprosthesis which is functioning normally with   appropriate transvalvular gradient.  Right ventricular systolic pressure is estimated to be 25 mmHg   (normal).   Compared to the prior study on 5/5/23, there are no significant   changes.           Assessment & Plan     1. S/P mitral valve replacement with bioprosthetic valve        2. Atrial fibrillation, permanent (HCC)        3. Biventricular ICD (implantable cardioverter-defibrillator) in place        4. Chronic anticoagulation        5. Chronic HFrEF  (heart failure with reduced ejection fraction) (HCC)        6. Dilated cardiomyopathy (HCC)        7. Nonischemic cardiomyopathy (HCC)        8. CHF NYHA class I (no symptoms from ordinary activities), chronic, systolic (HCC)        9. Hypercoagulable state due to permanent atrial fibrillation (HCC)                Medical Decision Making: Today's Assessment/Status/Plan:        Systolic and Diastolic HFrEF, Stage C, Class I, LVEF 25%:  -Heart failure due to nonischemic cardiomyopathy.  Likely multifactorial with valvular and rhythm etiologies.   -Persistently elevated BNP.  Stable.  Patient's baseline likely in 600s  -Discussed Heart failure trajectory and prognosis with patient. Will continue to optimize medical therapy as tolerated. Advanced HF treatment, need for remote monitoring consideration at every visit.  -ACE-I/ARB/ARNI: Hold at this time due to poor tolerance in the past  -Evidence Based Beta-blocker: Sotalol 80 mg twice daily  -Aldosterone Antagonist: Continue spironolactone 25 mg daily  -SGLT2-I: Continue farxiga 10 mg daily  -Diuretic: Euvolemic on exam  -Labs: Will continue to closely monitor for side effects of patient's high risk medication(s) including renal function, NTproBNP, and electrolytes as needed.  Continue supplemental potassium 10 mEq daily.  BMP on 4/23/2024 reviewed in care everywhere  -Status post CRT-D  -Status post mitral valve repair 2021 for primary MR  -Reinforced s/sx of worsening heart failure with patient and weight monitoring. Pt verbalizes understanding. Pt to call office if present.  -Heart Failure Education: pt to be contacted by HF nurse for further education.  In the meantime, during visit today we discussed indications and use for each medication, importance of treatment adherence, definition of heart failure and potential etiologies for current diagnosis.  -Pharmacotherapy following as needed  -Compliance Barriers none at this time  -Genetic testing consideration none at  this time  -Advanced care planning: Defer at this time  -Consideration for LVAD and/or Heart transplant as necessary  -PYP personally reviewed. Mild cardiac uptake, less than ribs. Equivocal, likely negative.  Discussed with Dr. Romero    Status post surgical mitral valve replacement, tricuspid valve repair  -Normal gradients on recent echo  -Surveillance    Persistent A-fib status post maze, MYRNA ligation  -Followed by Dr. Garza  -Sotalol  -No mode switching, brief episodes of SVT on device interrogation  -Eliquis 5 mg    V-fib/VT cardiac arrest; secondary prevention CRT-D  -Normal device function, no events on recent device interrogation on 1/8/2024.   -94% Vpaced    FU in clinic in 6 months with cardiology as recommended. Sooner if needed.    Patient verbalizes understanding and agrees with the plan of care.     I personally spent a total of 28 minutes which includes face-to-face time and non-face-to-face time spent on preparing to see the patient, reviewing prior notes and tests, obtaining history from the patient, performing a medically appropriate exam, counseling and educating the patient, ordering medications/tests/procedures/referrals as clinically indicated,  and documenting information in the electronic medical record.    Ana Hooper A.P.R.N.   [facility]  [phone]    PLEASE NOTE: This Note was created using voice recognition Software. I have made every reasonable attempt to correct obvious errors, but I expect that there are errors of grammar and possibly content that I did not discover before finalizing the note

## 2024-05-08 DIAGNOSIS — I42.0 DILATED CARDIOMYOPATHY (HCC): ICD-10-CM

## 2024-05-08 DIAGNOSIS — I50.22 CHRONIC HFREF (HEART FAILURE WITH REDUCED EJECTION FRACTION) (HCC): ICD-10-CM

## 2024-05-08 DIAGNOSIS — I48.21 ATRIAL FIBRILLATION, PERMANENT (HCC): ICD-10-CM

## 2024-05-08 DIAGNOSIS — Z95.810 BIVENTRICULAR ICD (IMPLANTABLE CARDIOVERTER-DEFIBRILLATOR) IN PLACE: ICD-10-CM

## 2024-05-08 RX ORDER — SPIRONOLACTONE 25 MG/1
25 TABLET ORAL DAILY
Qty: 90 TABLET | Refills: 1 | Status: SHIPPED | OUTPATIENT
Start: 2024-05-08

## 2024-06-05 ENCOUNTER — TELEPHONE (OUTPATIENT)
Dept: CARDIOLOGY | Facility: MEDICAL CENTER | Age: 74
End: 2024-06-05

## 2024-06-05 ENCOUNTER — OFFICE VISIT (OUTPATIENT)
Dept: CARDIOLOGY | Facility: MEDICAL CENTER | Age: 74
End: 2024-06-05
Attending: INTERNAL MEDICINE
Payer: MEDICARE

## 2024-06-05 VITALS
BODY MASS INDEX: 24.87 KG/M2 | HEART RATE: 67 BPM | WEIGHT: 200 LBS | RESPIRATION RATE: 14 BRPM | DIASTOLIC BLOOD PRESSURE: 68 MMHG | SYSTOLIC BLOOD PRESSURE: 116 MMHG | OXYGEN SATURATION: 96 % | HEIGHT: 75 IN

## 2024-06-05 DIAGNOSIS — Z95.810 BIVENTRICULAR ICD (IMPLANTABLE CARDIOVERTER-DEFIBRILLATOR) IN PLACE: ICD-10-CM

## 2024-06-05 DIAGNOSIS — D68.69 SECONDARY HYPERCOAGULABLE STATE (HCC): ICD-10-CM

## 2024-06-05 DIAGNOSIS — I47.29 NONSUSTAINED VENTRICULAR TACHYCARDIA (HCC): ICD-10-CM

## 2024-06-05 DIAGNOSIS — I50.22 CHRONIC HFREF (HEART FAILURE WITH REDUCED EJECTION FRACTION) (HCC): ICD-10-CM

## 2024-06-05 DIAGNOSIS — I42.8 NONISCHEMIC CARDIOMYOPATHY (HCC): ICD-10-CM

## 2024-06-05 DIAGNOSIS — Z79.01 CHRONIC ANTICOAGULATION: ICD-10-CM

## 2024-06-05 DIAGNOSIS — Z79.899 LONG TERM CURRENT USE OF ANTIARRHYTHMIC MEDICAL THERAPY: ICD-10-CM

## 2024-06-05 DIAGNOSIS — Z95.3 S/P MITRAL VALVE REPLACEMENT WITH BIOPROSTHETIC VALVE: ICD-10-CM

## 2024-06-05 DIAGNOSIS — I48.21 ATRIAL FIBRILLATION, PERMANENT (HCC): ICD-10-CM

## 2024-06-05 PROCEDURE — 93005 ELECTROCARDIOGRAM TRACING: CPT | Performed by: INTERNAL MEDICINE

## 2024-06-05 PROCEDURE — 99213 OFFICE O/P EST LOW 20 MIN: CPT | Performed by: INTERNAL MEDICINE

## 2024-06-05 PROCEDURE — 3074F SYST BP LT 130 MM HG: CPT | Performed by: INTERNAL MEDICINE

## 2024-06-05 PROCEDURE — 3078F DIAST BP <80 MM HG: CPT | Performed by: INTERNAL MEDICINE

## 2024-06-05 PROCEDURE — 99214 OFFICE O/P EST MOD 30 MIN: CPT | Performed by: INTERNAL MEDICINE

## 2024-06-05 RX ORDER — DAPAGLIFLOZIN 5 MG/1
5 TABLET, FILM COATED ORAL DAILY
Qty: 90 TABLET | Refills: 3 | Status: SHIPPED | OUTPATIENT
Start: 2024-06-05

## 2024-06-05 ASSESSMENT — ENCOUNTER SYMPTOMS
SHORTNESS OF BREATH: 0
COUGH: 0
MYALGIAS: 0
DIZZINESS: 0
PALPITATIONS: 0
LOSS OF CONSCIOUSNESS: 0

## 2024-06-05 ASSESSMENT — FIBROSIS 4 INDEX: FIB4 SCORE: 1.795097043004072588

## 2024-06-05 NOTE — PROGRESS NOTES
Chief Complaint   Patient presents with    Follow-Up     F/V Dx: Chronic HFrEF (heart failure with reduced ejection fraction) (HCC)    Atrial Fibrillation     F/V Dx: Atrial fibrillation, permanent (HCC)         Subjective     Pavan Cueva is a 74 y.o. male who presents today for follow-up cardiac care.     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    Since 9/7/2023 appointment the patient has had no new cardiac symptoms including chest pain, palpitations or shortness of breath.  Tolerating current medical regiment.  Had had a tendency for hypotension related to GDMT precluding the addition of ARB/ACE/ARNI which resulted in dizziness and hypotension.  Sometimes takes half of dapagliflozin 5 mg due to low blood pressure.  Currently active.  Works outside.  Denies any limitations but paces himself.    Review of the medical records indicate at the time of his cardiac arrest on 12/20/2021 he was hospitalized at [facility].  Echocardiogram showed LVEF of less than 20%.  He underwent upgrade to CRT-D of his PPM that was done in 2018 at the time of his original mitral valve repair surgery for sinus node dysfunction with atrial fibrillation.  It was recommended that further evaluation should be undertaken for etiologies causing nonischemic cardiomyopathy including evaluation of amyloidosis.  His redo mitral valve replacement had been done 3  months prior on 9/8/2021 at [facility], [place], [place] with a preoperative coronary angiogram on 8/18/2021 showing patent coronary arteries with anterior RCA origin.  A repeat coronary angiogram was not performed at the time of his cardiac arrest on 12/20/2021.    This past year last year 23 he was enrolled in the [facility].  Free light chain and SPEP were ordered but the patient did not have these drawn.  Repeat echocardiogram showed left ventricular enlargement, left ventricular ejection fraction of 20-25%, global hypokinesis (with inferior wall akinesis) mitral valve repair with normal gradients and no mitral regurgitation with dilated right ventricle with reduced systolic function.  Cardiac PET scan showed large fixed moderate-severe perfusion defect in the inferior inferolateral and anterolateral walls consistent with prior transmural infarction, no ischemia, normal TID and LVEF 39%.  PYP scan was equivocal for ATTR.    Past Medical History:   Diagnosis Date    AF (atrial fibrillation) ([facility])          Anemia     AV block, complete ([facility])     Cardiomyopathy, dilated ([facility]) 03/2022    Echocardiogram with severely dilated LV, LVEF 20-25%. Global hypokinesis. Normal RV. Severely dilated LA and RV. MV prothesis with elevated transvalvular gradient. Trace AR, mild TR. RVSP 47mmHg.    Chronic anticoagulation     Heart valve disease     Hemorrhagic disorder ([facility])     History of epistaxis    Hypertension     ARIANA (obstructive sleep apnea)     Renal mass     Restrictive lung disease     Sleep apnea     no cpap, 3L O2    Thyroid disease      Past Surgical History:   Procedure Laterality Date    MITRAL VALVE REPLACEMENT  09/08/2021    Redo MVR (#33 Angeli-Underwood Theon pericardial valve) by Dr. Chou, [place], [place]    PACEMAKER INSERTION Left 07/13/2018    Medtronic [place] XT DR JIN [id number] implanted by Dr. Gillette [place], [place]    MAZE PROCEDURE  05/2018     TRICUSPID VALVE REPAIR  2018    MITRAL VALVE REPAIR  2018     MV repair, TV repair, MYRNA ligation, MAZE procedure and biatrial resizing by Dr. Chou at [facility], [place], [place].    MITRAL VALVE REPAIR      OTHER      Nasal surgery for nosebleeds     Family History   Problem Relation Age of Onset    Cancer Mother     Stroke Father         CVA at 85, afib    Heart Disease Father      Social History     Socioeconomic History    Marital status:      Spouse name: Not on file    Number of children: Not on file    Years of education: Not on file    Highest education level: Not on file   Occupational History    Not on file   Tobacco Use    Smoking status: Former     Current packs/day: 0.00     Types: Cigarettes     Quit date: 1970     Years since quittin.4    Smokeless tobacco: Never   Vaping Use    Vaping status: Never Used   Substance and Sexual Activity    Alcohol use: Not Currently    Drug use: Not Currently    Sexual activity: Not on file   Other Topics Concern    Not on file   Social History Narrative    Not on file     Social Determinants of Health     Financial Resource Strain: Not on file   Food Insecurity: Not on file   Transportation Needs: Not on file   Physical Activity: Not on file   Stress: Not on file   Social Connections: Not on file   Intimate Partner Violence: Not on file   Housing Stability: Not on file     No Known Allergies  Outpatient Encounter Medications as of 2024   Medication Sig Dispense Refill    dapagliflozin propanediol (FARXIGA) 5 MG Tab Take 1 Tablet by mouth every day. 90 Tablet 3    spironolactone (ALDACTONE) 25 MG Tab TAKE 1 TABLET BY MOUTH EVERY DAY 90 Tablet 1    ELIQUIS 5 MG Tab TAKE 1 TABLET BY MOUTH TWICE DAILY 180 Tablet 1    sotalol (BETAPACE) 80 MG Tab TAKE 1 TABLET BY MOUTH TWICE DAILY. PLEASE COMPLETE LAB WORK. 180 Tablet 1    potassium chloride (MICRO-K) 10 MEQ capsule TAKE 1 CAPSULE BY MOUTH EVERY DAY FOR 6 DAYS PER WEEK SKIP SUNDAYS 77 Capsule 1     "levothyroxine (SYNTHROID) 125 MCG Tab TAKE 1 TABLET BY MOUTH EVERY MORNING MORE THAN 30 MINUTES BEFORE BREAKFAST.      Ascorbic Acid (VITAMIN C) 1000 MG Tab Take  by mouth.      Probiotic Product (PROBIOTIC-10) Chew Tab Chew 1 Tablet every day.      ferrous sulfate 325 (65 Fe) MG tablet Take 1 Tablet by mouth every 48 hours.      magnesium oxide (MAG-OX) 400 MG Tab tablet Take 1 tablet by mouth every day.      Multiple Vitamin (MULTI-VITAMINS) Tab Take 1 tablet by mouth every day.      [DISCONTINUED] dapagliflozin propanediol (FARXIGA) 5 MG Tab Take 1 Tablet by mouth every day. 90 Tablet 2     No facility-administered encounter medications on file as of 6/5/2024.     Review of Systems   Respiratory:  Negative for cough and shortness of breath.    Cardiovascular:  Negative for chest pain and palpitations.   Musculoskeletal:  Negative for myalgias.   Neurological:  Negative for dizziness and loss of consciousness.              Objective     /68 (BP Location: Left arm, Patient Position: Sitting, BP Cuff Size: Adult)   Pulse 67   Resp 14   Ht 1.905 m (6' 3\")   Wt 90.7 kg (200 lb)   SpO2 96%   BMI 25.00 kg/m²   BP Readings from Last 5 Encounters:   06/05/24 116/68   04/26/24 110/72   01/19/24 108/64   10/20/23 102/60   09/07/23 110/70      Pulse Readings from Last 5 Encounters:   06/05/24 67   04/26/24 71   01/19/24 72   10/20/23 64   09/07/23 78      Wt Readings from Last 5 Encounters:   06/05/24 90.7 kg (200 lb)   04/26/24 89.8 kg (198 lb)   01/19/24 94.3 kg (208 lb)   10/20/23 92.9 kg (204 lb 12.8 oz)   09/07/23 90.7 kg (200 lb)       Physical Exam  Eyes:      Conjunctiva/sclera: Conjunctivae normal.      Pupils: Pupils are equal, round, and reactive to light.   Neck:      Vascular: No JVD.   Cardiovascular:      Rate and Rhythm: Normal rate and regular rhythm.      Pulses: Normal pulses.      Heart sounds: Normal heart sounds.   Pulmonary:      Effort: Pulmonary effort is normal. No accessory muscle usage " or respiratory distress.      Breath sounds: Normal breath sounds. No wheezing or rales.   Musculoskeletal:      Right lower leg: No edema.      Left lower leg: No edema.   Skin:     General: Skin is warm and dry.      Findings: No rash.      Nails: There is no clubbing.   Neurological:      Mental Status: He is alert and oriented to person, place, and time.   Psychiatric:         Behavior: Behavior normal.            NM CARDIAC AMYLOIDOSIS SCAN 9/21/2023  1. Finding is equivocal for ATTR cardiac amyloidosis     CARDIAC PET SCAN 9/6/2023   NUCLEAR IMAGING INTERPRETATION   There is a large, fixed moderate to severe defect involving most of the    inferior, inferolateral, and anterolateral walls consistent with prior    transmural infarct.   There is no significant reversibility (SDS = 0).   Moderately reduced left ventricular systolic function.   Normal TID ratio.   Reduced coronary flow reserve of (1.3).   ECG INTERPRETATION   Unable to evaluate due to ventricular paced rhythm.    ECHOCARDIOGRAM 12/21/2020 ([facility]  Left ventricular systolic function is moderately reduced. The Ejection Fraction estimate is 35-40%   Mitral valve repair with moderate mitral regurgitation   An annuloplasty ring is noted in the tricuspid position. There is mild to moderate tricuspid   regurgitation       ECHOCARDIOGRAM 8/19/2022  The left ventricle is mildly dilated.  Severely reduced left ventricular systolic function.  The left ventricular ejection fraction is visually estimated to be 25%.  Grade III diastolic dysfunction (restrictive pattern).  Known mitral valve bioprosthesis which is functioning normally with   appropriate transvalvular gradient.  Mild tricuspid regurgitation.  Right ventricular systolic pressure is estimated to be 60 mmHg.  Pacer/ICD wire seen in the right ventricle.  Severely dilated left and right atria.  Compared to the images of the prior study 3/18/2022, there has been no   significant change  except pulmonary pressures now measure higher.     ECHOCARDIOGRAM 5/5/2023  Compared to the prior study on 8/19/22, no significant changes.  Side-  by-side image comparison done.  Severely reduced left ventricular systolic function.  The left ventricular ejection fraction is visually estimated to be 25%.  Global hypokinesis with abnormal septal motion consistent with   ventricular pacing.  Severely dilated left atrium.  Known mitral valve bioprosthesis which is functioning normally with   appropriate transvalvular gradient. Mean transvalvular gradient is 5   mmHg at a heart rate of 69 BPM.  Dilated inferior vena cava without inspiratory collapse.    ECHOCARDIOGRAM 1/12/2024  Severely reduced left ventricular systolic function. The left   ventricular ejection fraction is visually estimated to be 20-25%.   Global hypokinesis.   The right ventricle is dilated. Reduced right ventricular systolic   function.   Biatrial enlargement.   Known mitral valve bioprosthesis which is functioning normally with   appropriate transvalvular gradient.  Right ventricular systolic pressure is estimated to be 25 mmHg   (normal).   Compared to the prior study on 5/5/23, there are no significant   changes.    CARDIAC CATHETERIZATION 2018 ([facility])  [facility]: mild disease, EF 50%, 4+ MR, 2+ AR.    CARDIAC CATHETERIZATION 8/18/2021 ([facility])  Left ventricular ejection fraction 25-35%  At least moderate mitral regurgitation  Mild to moderate pulmonary hypertension  Normal epicardial coronary arteries, anterior takeoff of right coronary artery    Assessment & Plan     1. Chronic HFrEF (heart failure with reduced ejection fraction) (HCC)  dapagliflozin propanediol (FARXIGA) 5 MG Tab      2. Nonischemic cardiomyopathy (HCC)        3. Nonsustained ventricular tachycardia (HCC)        4. Biventricular ICD (implantable cardioverter-defibrillator) in place        5. S/P mitral valve replacement with bioprosthetic valve         6. Atrial fibrillation, permanent (HCC)  EKG      7. Secondary hypercoagulable state (HCC)        8. Chronic anticoagulation        9. Long term current use of antiarrhythmic medical therapy              Medical Decision Making: Today's Assessment/Status/Plan:   Assessment  HFrEF 25%.  Nonischemic cardiomyopathy  S/P MVR (33 mm Angeli Underwood Theon pericardial valve) 9/8/2021 ([place], [place]).  S/P mitral valve repair, tricuspid valve repair, MAZE, MYRNA ligation, PPM 7/13/2018 ([place], [place])  Cardiac arrest 12/20/2021  CRT-D (PPM upgrade) 12/22/2021 VVIR, 96% ventricular pacing  Atrial fibrillation, permanent  Amiodarone therapy, started 12/2021, stopped 10/2022; sotalol started  Anticoagulation, apixaban.  Hypothyroidism amiodarone induced, followed by Dr. Mackay endocrinologist [place].  Hypertension.  ARIANA     Recommendation Discussion  Clinically stable with no volume overload, NYHA class II on limited GDMT therapy due to low blood pressure.  Reviewed cardiac PET scan results showing fixed perfusion defects consistent with infarctions.  Extensive review of the medical records and imaging studies suggest that he has had an interim inferior infarction from the time of his redo mitral valve surgery possibly at the time of his cardiac arrest in 12/2021 as the inferior wall has been akinetic with recent perfusion abnormality confirming an infarction defect which may explain his declining LVEF from 30-45% to 20-25%. After detailed discussion I was decided to proceed with coronary angiography.  HFrEF 25%, clinically stable, highly functional, NYHA class I-II, for GDMT only on dapagliflozin, spironolactone; attempts with ACE/ARB/ARNI have resulted in symptomatic hypotension, discussed considering rechallenge with low-dose ACE inhibitor enalapril 2.5 mg daily or lisinopril 2.5 mg daily which he will consider  Atrial fibrillation, permanent, on sotalol, apixaban  Anticoagulation, on  apixaban, normal renal function, continue 5 mg twice daily  MVR, functioning normally  CRT-D V paced 99%  RTC 4-6 months.

## 2024-06-05 NOTE — TELEPHONE ENCOUNTER
The last pacer check was 7/19/2023 and only needs an annual pacer check so July would be the next one needed. Thank you!

## 2024-06-05 NOTE — TELEPHONE ENCOUNTER
Pavan Badillo just saw  in office today and wants to know when his next device check should be. It looks like the last time we saw Pavan for a device check in office was April of 2023.

## 2024-06-06 ENCOUNTER — TELEPHONE (OUTPATIENT)
Dept: CARDIOLOGY | Facility: MEDICAL CENTER | Age: 74
End: 2024-06-06
Payer: MEDICARE

## 2024-06-07 NOTE — TELEPHONE ENCOUNTER
SYD Gallegos.  Ethyl Ban Izaguirre RFrancineN.  Can you let patient know that I reviewed his device and leads with the device rep?  His original pacemaker lead is not MRI compatible, so we are unable to proceed with cardiac MRI at this time    Phone number called: [phone] (home)     Call outcome: pt made aware of JOSHUA's message.      Patient/Caregiver provided printed discharge information.

## 2024-06-08 LAB — EKG IMPRESSION: NORMAL

## 2024-06-08 PROCEDURE — 93010 ELECTROCARDIOGRAM REPORT: CPT | Performed by: INTERNAL MEDICINE

## 2024-06-10 ENCOUNTER — TELEPHONE (OUTPATIENT)
Dept: CARDIOLOGY | Facility: MEDICAL CENTER | Age: 74
End: 2024-06-10
Payer: MEDICARE

## 2024-06-10 NOTE — TELEPHONE ENCOUNTER
MATTHEW          Caller: Pavan Cueva      Topic/issue: Patient was returning our call about testing that had been reviewed and he asked for a call back      Callback Number: [phone]      Thank you    -Pantera BARRERA

## 2024-06-13 NOTE — TELEPHONE ENCOUNTER
"Nicki Orr, Med Ass't routed conversation to You17 hours ago (5:30 PM)     Nicki Orr Med Ass't17 hours ago (5:30 PM)     MATTHEW     Caller: Pavan Cueva     Topic/issue: MEDICAL ADVICE     Pavan is requesting a call back. He states that SW has been trying to reach him and he has not been available . Please advise.     Thank you,  Harrison S.     Callback Number: [phone] (home)         Called pt. He saw SW last Thursday, wanted to review chart regarding if SW can evaluate him for other issues other than cardiac as he is \"letting go of some doctors\". Pt is in [facility] visiting [place] currently and advised signal might not be well. He's just returning SW's call to go over his chart as he missed call since Sunday, said they have been playing phone tag. Advised will reach out to SW to see if he can give him call soon. He verbalized understanding and was appreciative.    To SW: please review above and advise if you can call pt back or if you want us to call him back with your message. Thank you!  "

## 2024-06-13 NOTE — TELEPHONE ENCOUNTER
MATTHEW    Caller: Pavan Cueva    Topic/issue: MEDICAL ADVICE    Pavan is requesting a call back. He states that MATTHEW has been trying to reach him and he has not been available . Please advise.    Thank you,  Harrison MATIAS    Callback Number: [phone] (home)

## 2024-06-17 ENCOUNTER — TELEPHONE (OUTPATIENT)
Dept: CARDIOLOGY | Facility: MEDICAL CENTER | Age: 74
End: 2024-06-17
Payer: MEDICARE

## 2024-06-17 DIAGNOSIS — R93.1 ABNORMAL NUCLEAR CARDIAC IMAGING TEST: Primary | ICD-10-CM

## 2024-06-17 DIAGNOSIS — I50.22 CHRONIC HFREF (HEART FAILURE WITH REDUCED EJECTION FRACTION) (HCC): ICD-10-CM

## 2024-06-17 NOTE — TELEPHONE ENCOUNTER
Patient is scheduled on 7-10-24 for a [facility] w/poss with . Patient was told to hold Eliquis for 2 days prior, hold Farxiga 4 days prior and to check in at 9:30 for an 11:30 procedure. Updated H&P to be done on admit to by NP. Pre admit to call patient.

## 2024-06-19 ENCOUNTER — APPOINTMENT (OUTPATIENT)
Dept: ADMISSIONS | Facility: MEDICAL CENTER | Age: 74
End: 2024-06-19
Attending: INTERNAL MEDICINE
Payer: MEDICARE

## 2024-06-19 ENCOUNTER — APPOINTMENT (OUTPATIENT)
Dept: ADMISSIONS | Facility: MEDICAL CENTER | Age: 74
End: 2024-06-19
Payer: MEDICARE

## 2024-06-20 ENCOUNTER — PATIENT MESSAGE (OUTPATIENT)
Dept: CARDIOLOGY | Facility: MEDICAL CENTER | Age: 74
End: 2024-06-20
Payer: MEDICARE

## 2024-06-24 ENCOUNTER — PRE-ADMISSION TESTING (OUTPATIENT)
Dept: ADMISSIONS | Facility: MEDICAL CENTER | Age: 74
End: 2024-06-24
Attending: INTERNAL MEDICINE
Payer: MEDICARE

## 2024-06-24 ENCOUNTER — TELEPHONE (OUTPATIENT)
Dept: CARDIOLOGY | Facility: MEDICAL CENTER | Age: 74
End: 2024-06-24

## 2024-06-24 NOTE — TELEPHONE ENCOUNTER
Spoke to patient about his procedure on 7-10-24. He just wants  to know that he would like for him to go thru the wrist if at all possible. I told him the Dr.s do try the wrist first,but if for any reason they can't,then they have to go thru the groin area.

## 2024-06-24 NOTE — OR NURSING
Pre-Admit RN tele appointment completed with pt, by this RN, for procedure on 7/10/24. Discussed pre-procedure instructions. Instructed pt to hold Eliquis x2 days, and Farxiga x4 days, prior to procedure, per orders. Pt verbalized understanding of all instructions. Pt denies any questions or concerns.

## 2024-06-24 NOTE — TELEPHONE ENCOUNTER
Freddy Evans R.N.8 minutes ago (11:35 AM)     I spoke to the patient and answered his question. He is still good to go for his procedure on 7-10-24.       Noted above per Freddy.

## 2024-06-24 NOTE — TELEPHONE ENCOUNTER
BE  Caller: Pavan Cueva    Topic/issue: Patient would like to discuss his procedure with BE. It is scheduled 07/10/2024. Please call back when able.    Callback Number: [phone]    Thank you,  Nette CORNEJO

## 2024-07-03 ENCOUNTER — TELEPHONE (OUTPATIENT)
Dept: CARDIOLOGY | Facility: MEDICAL CENTER | Age: 74
End: 2024-07-03
Payer: MEDICARE

## 2024-07-08 ENCOUNTER — PRE-ADMISSION TESTING (OUTPATIENT)
Dept: ADMISSIONS | Facility: MEDICAL CENTER | Age: 74
End: 2024-07-08
Attending: INTERNAL MEDICINE
Payer: MEDICARE

## 2024-07-08 DIAGNOSIS — Z01.810 PRE-OPERATIVE CARDIOVASCULAR EXAMINATION: ICD-10-CM

## 2024-07-08 DIAGNOSIS — Z01.812 PRE-OPERATIVE LABORATORY EXAMINATION: ICD-10-CM

## 2024-07-08 LAB
ALBUMIN SERPL BCP-MCNC: 4.4 G/DL (ref 3.2–4.9)
ALBUMIN/GLOB SERPL: 1.6 G/DL
ALP SERPL-CCNC: 52 U/L (ref 30–99)
ALT SERPL-CCNC: 13 U/L (ref 2–50)
ANION GAP SERPL CALC-SCNC: 7 MMOL/L (ref 7–16)
APTT PPP: 28.8 SEC (ref 24.7–36)
AST SERPL-CCNC: 13 U/L (ref 12–45)
BILIRUB SERPL-MCNC: 0.8 MG/DL (ref 0.1–1.5)
BUN SERPL-MCNC: 18 MG/DL (ref 8–22)
CALCIUM ALBUM COR SERPL-MCNC: 8.9 MG/DL (ref 8.5–10.5)
CALCIUM SERPL-MCNC: 9.2 MG/DL (ref 8.5–10.5)
CHLORIDE SERPL-SCNC: 104 MMOL/L (ref 96–112)
CO2 SERPL-SCNC: 26 MMOL/L (ref 20–33)
CREAT SERPL-MCNC: 0.83 MG/DL (ref 0.5–1.4)
EKG IMPRESSION: NORMAL
ERYTHROCYTE [DISTWIDTH] IN BLOOD BY AUTOMATED COUNT: 44.9 FL (ref 35.9–50)
GFR SERPLBLD CREATININE-BSD FMLA CKD-EPI: 92 ML/MIN/1.73 M 2
GLOBULIN SER CALC-MCNC: 2.8 G/DL (ref 1.9–3.5)
GLUCOSE SERPL-MCNC: 90 MG/DL (ref 65–99)
HCT VFR BLD AUTO: 39.8 % (ref 42–52)
HGB BLD-MCNC: 13.1 G/DL (ref 14–18)
INR PPP: 1.18 (ref 0.87–1.13)
MCH RBC QN AUTO: 30.5 PG (ref 27–33)
MCHC RBC AUTO-ENTMCNC: 32.9 G/DL (ref 32.3–36.5)
MCV RBC AUTO: 92.8 FL (ref 81.4–97.8)
PLATELET # BLD AUTO: 171 K/UL (ref 164–446)
PMV BLD AUTO: 9.6 FL (ref 9–12.9)
POTASSIUM SERPL-SCNC: 4.8 MMOL/L (ref 3.6–5.5)
PROT SERPL-MCNC: 7.2 G/DL (ref 6–8.2)
PROTHROMBIN TIME: 15.1 SEC (ref 12–14.6)
RBC # BLD AUTO: 4.29 M/UL (ref 4.7–6.1)
SODIUM SERPL-SCNC: 137 MMOL/L (ref 135–145)
WBC # BLD AUTO: 4.7 K/UL (ref 4.8–10.8)

## 2024-07-08 PROCEDURE — 93005 ELECTROCARDIOGRAM TRACING: CPT

## 2024-07-08 PROCEDURE — 93010 ELECTROCARDIOGRAM REPORT: CPT | Performed by: INTERNAL MEDICINE

## 2024-07-08 PROCEDURE — 85027 COMPLETE CBC AUTOMATED: CPT

## 2024-07-08 PROCEDURE — 80053 COMPREHEN METABOLIC PANEL: CPT

## 2024-07-08 PROCEDURE — 85730 THROMBOPLASTIN TIME PARTIAL: CPT

## 2024-07-08 PROCEDURE — 36415 COLL VENOUS BLD VENIPUNCTURE: CPT

## 2024-07-08 PROCEDURE — 85610 PROTHROMBIN TIME: CPT

## 2024-07-09 ASSESSMENT — ENCOUNTER SYMPTOMS
DIZZINESS: 0
SHORTNESS OF BREATH: 0
PALPITATIONS: 0
ORTHOPNEA: 0
LOSS OF CONSCIOUSNESS: 0

## 2024-07-10 ENCOUNTER — HOSPITAL ENCOUNTER (OUTPATIENT)
Facility: MEDICAL CENTER | Age: 74
End: 2024-07-10
Attending: INTERNAL MEDICINE | Admitting: INTERNAL MEDICINE
Payer: MEDICARE

## 2024-07-10 ENCOUNTER — APPOINTMENT (OUTPATIENT)
Dept: CARDIOLOGY | Facility: MEDICAL CENTER | Age: 74
End: 2024-07-10
Attending: INTERNAL MEDICINE
Payer: MEDICARE

## 2024-07-10 VITALS
TEMPERATURE: 97.8 F | DIASTOLIC BLOOD PRESSURE: 68 MMHG | HEIGHT: 75 IN | WEIGHT: 199.08 LBS | HEART RATE: 61 BPM | RESPIRATION RATE: 19 BRPM | OXYGEN SATURATION: 97 % | SYSTOLIC BLOOD PRESSURE: 104 MMHG | BODY MASS INDEX: 24.75 KG/M2

## 2024-07-10 DIAGNOSIS — I47.29 NONSUSTAINED VENTRICULAR TACHYCARDIA (HCC): ICD-10-CM

## 2024-07-10 DIAGNOSIS — I50.22 CHRONIC HFREF (HEART FAILURE WITH REDUCED EJECTION FRACTION) (HCC): ICD-10-CM

## 2024-07-10 DIAGNOSIS — R93.1 ABNORMAL NUCLEAR CARDIAC IMAGING TEST: ICD-10-CM

## 2024-07-10 LAB
ACT BLD: 256 SEC (ref 74–137)
ACT BLD: 336 SEC (ref 74–137)
EKG IMPRESSION: NORMAL
GLUCOSE BLD STRIP.AUTO-MCNC: 73 MG/DL (ref 65–99)

## 2024-07-10 PROCEDURE — 160002 HCHG RECOVERY MINUTES (STAT)

## 2024-07-10 PROCEDURE — 700102 HCHG RX REV CODE 250 W/ 637 OVERRIDE(OP)

## 2024-07-10 PROCEDURE — 93005 ELECTROCARDIOGRAM TRACING: CPT | Performed by: INTERNAL MEDICINE

## 2024-07-10 PROCEDURE — 700117 HCHG RX CONTRAST REV CODE 255: Performed by: INTERNAL MEDICINE

## 2024-07-10 PROCEDURE — 700105 HCHG RX REV CODE 258: Performed by: INTERNAL MEDICINE

## 2024-07-10 PROCEDURE — 99153 MOD SED SAME PHYS/QHP EA: CPT

## 2024-07-10 PROCEDURE — 160035 HCHG PACU - 1ST 60 MINS PHASE I

## 2024-07-10 PROCEDURE — 700111 HCHG RX REV CODE 636 W/ 250 OVERRIDE (IP)

## 2024-07-10 PROCEDURE — 160036 HCHG PACU - EA ADDL 30 MINS PHASE I

## 2024-07-10 PROCEDURE — 700101 HCHG RX REV CODE 250

## 2024-07-10 PROCEDURE — 92928 PRQ TCAT PLMT NTRAC ST 1 LES: CPT | Mod: LC | Performed by: INTERNAL MEDICINE

## 2024-07-10 PROCEDURE — 85347 COAGULATION TIME ACTIVATED: CPT | Mod: 91

## 2024-07-10 PROCEDURE — 92978 ENDOLUMINL IVUS OCT C 1ST: CPT | Mod: 26,LC | Performed by: INTERNAL MEDICINE

## 2024-07-10 PROCEDURE — 93458 L HRT ARTERY/VENTRICLE ANGIO: CPT | Mod: 26,59 | Performed by: INTERNAL MEDICINE

## 2024-07-10 PROCEDURE — 82962 GLUCOSE BLOOD TEST: CPT

## 2024-07-10 PROCEDURE — 160046 HCHG PACU - 1ST 60 MINS PHASE II

## 2024-07-10 PROCEDURE — 99152 MOD SED SAME PHYS/QHP 5/>YRS: CPT | Performed by: INTERNAL MEDICINE

## 2024-07-10 PROCEDURE — A9270 NON-COVERED ITEM OR SERVICE: HCPCS

## 2024-07-10 PROCEDURE — 93010 ELECTROCARDIOGRAM REPORT: CPT | Mod: 59 | Performed by: INTERNAL MEDICINE

## 2024-07-10 RX ORDER — ASPIRIN 81 MG/1
81 TABLET ORAL DAILY
Status: DISCONTINUED | OUTPATIENT
Start: 2024-07-10 | End: 2024-07-10

## 2024-07-10 RX ORDER — HEPARIN SODIUM 1000 [USP'U]/ML
INJECTION, SOLUTION INTRAVENOUS; SUBCUTANEOUS
Status: COMPLETED
Start: 2024-07-10 | End: 2024-07-10

## 2024-07-10 RX ORDER — CLOPIDOGREL 300 MG/1
600 TABLET, FILM COATED ORAL ONCE
Status: DISCONTINUED | OUTPATIENT
Start: 2024-07-10 | End: 2024-07-10 | Stop reason: HOSPADM

## 2024-07-10 RX ORDER — LIDOCAINE HYDROCHLORIDE 20 MG/ML
INJECTION, SOLUTION INFILTRATION; PERINEURAL
Status: COMPLETED
Start: 2024-07-10 | End: 2024-07-10

## 2024-07-10 RX ORDER — CLOPIDOGREL BISULFATE 75 MG/1
75 TABLET ORAL DAILY
Status: DISCONTINUED | OUTPATIENT
Start: 2024-07-11 | End: 2024-07-10 | Stop reason: HOSPADM

## 2024-07-10 RX ORDER — SODIUM CHLORIDE 9 MG/ML
3 INJECTION, SOLUTION INTRAVENOUS CONTINUOUS
Status: DISCONTINUED | OUTPATIENT
Start: 2024-07-10 | End: 2024-07-10 | Stop reason: HOSPADM

## 2024-07-10 RX ORDER — ATORVASTATIN CALCIUM 40 MG/1
40 TABLET, FILM COATED ORAL NIGHTLY
Qty: 100 TABLET | Refills: 3 | Status: SHIPPED | OUTPATIENT
Start: 2024-07-10

## 2024-07-10 RX ORDER — ASPIRIN 81 MG/1
TABLET, CHEWABLE ORAL
Status: COMPLETED
Start: 2024-07-10 | End: 2024-07-10

## 2024-07-10 RX ORDER — VERAPAMIL HYDROCHLORIDE 2.5 MG/ML
INJECTION, SOLUTION INTRAVENOUS
Status: COMPLETED
Start: 2024-07-10 | End: 2024-07-10

## 2024-07-10 RX ORDER — HEPARIN SODIUM 200 [USP'U]/100ML
INJECTION, SOLUTION INTRAVENOUS
Status: COMPLETED
Start: 2024-07-10 | End: 2024-07-10

## 2024-07-10 RX ORDER — CLOPIDOGREL 300 MG/1
TABLET, FILM COATED ORAL
Status: COMPLETED
Start: 2024-07-10 | End: 2024-07-10

## 2024-07-10 RX ORDER — CLOPIDOGREL BISULFATE 75 MG/1
75 TABLET ORAL DAILY
Qty: 100 TABLET | Refills: 3 | Status: SHIPPED | OUTPATIENT
Start: 2024-07-10

## 2024-07-10 RX ORDER — MIDAZOLAM HYDROCHLORIDE 1 MG/ML
INJECTION INTRAMUSCULAR; INTRAVENOUS
Status: COMPLETED
Start: 2024-07-10 | End: 2024-07-10

## 2024-07-10 RX ADMIN — FENTANYL CITRATE 100 MCG: 50 INJECTION, SOLUTION INTRAMUSCULAR; INTRAVENOUS at 11:31

## 2024-07-10 RX ADMIN — MIDAZOLAM HYDROCHLORIDE 2 MG: 1 INJECTION, SOLUTION INTRAMUSCULAR; INTRAVENOUS at 11:07

## 2024-07-10 RX ADMIN — ASPIRIN 324 MG: 81 TABLET, CHEWABLE ORAL at 10:55

## 2024-07-10 RX ADMIN — CLOPIDOGREL BISULFATE 600 MG: 300 TABLET, FILM COATED ORAL at 11:46

## 2024-07-10 RX ADMIN — LIDOCAINE HYDROCHLORIDE: 20 INJECTION, SOLUTION INFILTRATION; PERINEURAL at 11:04

## 2024-07-10 RX ADMIN — HEPARIN SODIUM: 1000 INJECTION, SOLUTION INTRAVENOUS; SUBCUTANEOUS at 11:43

## 2024-07-10 RX ADMIN — NITROGLYCERIN 10 ML: 20 INJECTION INTRAVENOUS at 11:04

## 2024-07-10 RX ADMIN — HEPARIN SODIUM 2000 UNITS: 200 INJECTION, SOLUTION INTRAVENOUS at 11:03

## 2024-07-10 RX ADMIN — HEPARIN SODIUM: 1000 INJECTION, SOLUTION INTRAVENOUS; SUBCUTANEOUS at 11:04

## 2024-07-10 RX ADMIN — IOHEXOL 114 ML: 350 INJECTION, SOLUTION INTRAVENOUS at 11:43

## 2024-07-10 RX ADMIN — VERAPAMIL HYDROCHLORIDE 5 MG: 2.5 INJECTION, SOLUTION INTRAVENOUS at 11:04

## 2024-07-10 RX ADMIN — SODIUM CHLORIDE 3 ML/KG/HR: 9 INJECTION, SOLUTION INTRAVENOUS at 12:07

## 2024-07-10 ASSESSMENT — FIBROSIS 4 INDEX: FIB4 SCORE: 1.54

## 2024-07-10 ASSESSMENT — PAIN DESCRIPTION - PAIN TYPE
TYPE: SURGICAL PAIN
TYPE: ACUTE PAIN
TYPE: SURGICAL PAIN
TYPE: ACUTE PAIN
TYPE: SURGICAL PAIN
TYPE: ACUTE PAIN
TYPE: SURGICAL PAIN

## 2024-07-19 ENCOUNTER — PATIENT MESSAGE (OUTPATIENT)
Dept: CARDIOLOGY | Facility: MEDICAL CENTER | Age: 74
End: 2024-07-19
Payer: MEDICARE

## 2024-07-22 ENCOUNTER — NON-PROVIDER VISIT (OUTPATIENT)
Dept: CARDIOLOGY | Facility: MEDICAL CENTER | Age: 74
End: 2024-07-22
Payer: MEDICARE

## 2024-07-23 PROCEDURE — 93295 DEV INTERROG REMOTE 1/2/MLT: CPT | Performed by: INTERNAL MEDICINE

## 2024-07-24 ENCOUNTER — OFFICE VISIT (OUTPATIENT)
Dept: CARDIOLOGY | Facility: MEDICAL CENTER | Age: 74
End: 2024-07-24
Payer: MEDICARE

## 2024-07-24 VITALS
OXYGEN SATURATION: 95 % | DIASTOLIC BLOOD PRESSURE: 66 MMHG | WEIGHT: 199 LBS | SYSTOLIC BLOOD PRESSURE: 110 MMHG | RESPIRATION RATE: 16 BRPM | BODY MASS INDEX: 24.74 KG/M2 | HEIGHT: 75 IN | HEART RATE: 82 BPM

## 2024-07-24 DIAGNOSIS — Z79.899 LONG TERM CURRENT USE OF ANTIARRHYTHMIC MEDICAL THERAPY: ICD-10-CM

## 2024-07-24 DIAGNOSIS — Z79.01 CHRONIC ANTICOAGULATION: ICD-10-CM

## 2024-07-24 DIAGNOSIS — I50.22 CHRONIC HFREF (HEART FAILURE WITH REDUCED EJECTION FRACTION) (HCC): ICD-10-CM

## 2024-07-24 DIAGNOSIS — Z95.3 S/P MITRAL VALVE REPLACEMENT WITH BIOPROSTHETIC VALVE: ICD-10-CM

## 2024-07-24 DIAGNOSIS — D68.69 SECONDARY HYPERCOAGULABLE STATE (HCC): ICD-10-CM

## 2024-07-24 DIAGNOSIS — I48.21 ATRIAL FIBRILLATION, PERMANENT (HCC): ICD-10-CM

## 2024-07-24 DIAGNOSIS — Z95.810 BIVENTRICULAR ICD (IMPLANTABLE CARDIOVERTER-DEFIBRILLATOR) IN PLACE: ICD-10-CM

## 2024-07-24 DIAGNOSIS — R93.1 ABNORMAL NUCLEAR CARDIAC IMAGING TEST: ICD-10-CM

## 2024-07-24 DIAGNOSIS — Z79.899 HIGH RISK MEDICATION USE: ICD-10-CM

## 2024-07-24 DIAGNOSIS — I25.10 CORONARY ARTERY DISEASE INVOLVING NATIVE CORONARY ARTERY OF NATIVE HEART WITHOUT ANGINA PECTORIS: ICD-10-CM

## 2024-07-24 DIAGNOSIS — Z95.5 S/P CORONARY ARTERY STENT PLACEMENT: ICD-10-CM

## 2024-07-24 PROCEDURE — 99214 OFFICE O/P EST MOD 30 MIN: CPT

## 2024-07-24 PROCEDURE — 3074F SYST BP LT 130 MM HG: CPT

## 2024-07-24 PROCEDURE — 99213 OFFICE O/P EST LOW 20 MIN: CPT

## 2024-07-24 PROCEDURE — 3078F DIAST BP <80 MM HG: CPT

## 2024-07-24 ASSESSMENT — ENCOUNTER SYMPTOMS
DIZZINESS: 0
SYNCOPE: 0
PND: 0
NEAR-SYNCOPE: 0
ORTHOPNEA: 0
PALPITATIONS: 0
SHORTNESS OF BREATH: 0
HEADACHES: 0
DYSPNEA ON EXERTION: 0
LIGHT-HEADEDNESS: 0

## 2024-07-24 ASSESSMENT — FIBROSIS 4 INDEX: FIB4 SCORE: 1.54

## 2024-07-25 ENCOUNTER — PATIENT MESSAGE (OUTPATIENT)
Dept: CARDIOLOGY | Facility: MEDICAL CENTER | Age: 74
End: 2024-07-25
Payer: MEDICARE

## 2024-07-26 ENCOUNTER — TELEPHONE (OUTPATIENT)
Dept: CARDIOLOGY | Facility: MEDICAL CENTER | Age: 74
End: 2024-07-26
Payer: MEDICARE

## 2024-07-30 DIAGNOSIS — Z79.01 CHRONIC ANTICOAGULATION: ICD-10-CM

## 2024-08-30 LAB
CHOLEST SERPL-MCNC: 108 MG/DL
HDLC SERPL-MCNC: 49 MG/DL
LDLC SERPL CALC-MCNC: 49 MG/DL
TRIGL SERPL-MCNC: 42 MG/DL

## 2024-09-05 DIAGNOSIS — I25.10 CORONARY ARTERY DISEASE INVOLVING NATIVE CORONARY ARTERY OF NATIVE HEART WITHOUT ANGINA PECTORIS: ICD-10-CM

## 2024-09-05 DIAGNOSIS — Z95.5 S/P CORONARY ARTERY STENT PLACEMENT: ICD-10-CM

## 2024-09-11 ENCOUNTER — PATIENT MESSAGE (OUTPATIENT)
Dept: CARDIOLOGY | Facility: MEDICAL CENTER | Age: 74
End: 2024-09-11
Payer: MEDICARE

## 2024-09-11 DIAGNOSIS — I48.21 ATRIAL FIBRILLATION, PERMANENT (HCC): ICD-10-CM

## 2024-09-14 DIAGNOSIS — Z79.899 HIGH RISK MEDICATION USE: ICD-10-CM

## 2024-09-16 ENCOUNTER — PATIENT MESSAGE (OUTPATIENT)
Dept: CARDIOLOGY | Facility: MEDICAL CENTER | Age: 74
End: 2024-09-16
Payer: MEDICARE

## 2024-09-16 RX ORDER — POTASSIUM CHLORIDE 750 MG/1
CAPSULE, EXTENDED RELEASE ORAL
Qty: 77 CAPSULE | Refills: 1 | Status: SHIPPED | OUTPATIENT
Start: 2024-09-16

## 2024-09-19 NOTE — TELEPHONE ENCOUNTER
Okay to schedule whenever available        
Case emailed to Manda and Scott is [facility] lab scheduling.  
Dr. Garza,    Your first availability for ablation is 6-3-21. Patient wanted me to confirm with you that he will be ok to wait this long, he states you told him this could be done in a month and since that is not the case he needs confirmation.    Please advice.    Patient scheduled for afib/flutter ablation on 6-3-21 with Dr. Garza. Patient has been instructed to check in at 11:00 for 1:00 case time. Message sent to shun Coelho with [employer] notified. Emailed Kisha  
Due to a cancellation - I called and LM on patient's voicemail to see if he would like to move this procedure to 4-13-21. I did request a call back today in order to move this procedure.  
Jesus Garza M.D.  Reva Mendoza, Med Ass't   Let's schedule fib/flutter. No meds to hold. MDT pacemaker.     
LM on patient's voicemail to call back to schedule this procedure.  
No return call from patient - I will have to move on to the next patient on the cancellation list.  
Patient notified Dr. Garza is ok with this date.  
Recvd voice message from patient - called and left another message on patient's voicemail requesting a call back.   
Right arm;

## 2024-10-09 ENCOUNTER — APPOINTMENT (OUTPATIENT)
Dept: CARDIOLOGY | Facility: MEDICAL CENTER | Age: 74
End: 2024-10-09
Attending: INTERNAL MEDICINE
Payer: MEDICARE

## 2024-10-15 ENCOUNTER — PATIENT MESSAGE (OUTPATIENT)
Dept: CARDIOLOGY | Facility: MEDICAL CENTER | Age: 74
End: 2024-10-15
Payer: MEDICARE

## 2024-10-16 ENCOUNTER — PATIENT MESSAGE (OUTPATIENT)
Dept: CARDIOLOGY | Facility: MEDICAL CENTER | Age: 74
End: 2024-10-16
Payer: MEDICARE

## 2024-10-17 ENCOUNTER — PATIENT MESSAGE (OUTPATIENT)
Dept: CARDIOLOGY | Facility: MEDICAL CENTER | Age: 74
End: 2024-10-17
Payer: MEDICARE

## 2024-10-28 ENCOUNTER — NON-PROVIDER VISIT (OUTPATIENT)
Dept: CARDIOLOGY | Facility: MEDICAL CENTER | Age: 74
End: 2024-10-28
Payer: MEDICARE

## 2024-10-29 PROCEDURE — 93295 DEV INTERROG REMOTE 1/2/MLT: CPT | Performed by: STUDENT IN AN ORGANIZED HEALTH CARE EDUCATION/TRAINING PROGRAM

## 2024-10-30 ENCOUNTER — OFFICE VISIT (OUTPATIENT)
Dept: CARDIOLOGY | Facility: MEDICAL CENTER | Age: 74
End: 2024-10-30
Attending: INTERNAL MEDICINE
Payer: MEDICARE

## 2024-10-30 VITALS
HEIGHT: 75 IN | DIASTOLIC BLOOD PRESSURE: 60 MMHG | BODY MASS INDEX: 24.62 KG/M2 | OXYGEN SATURATION: 97 % | HEART RATE: 77 BPM | WEIGHT: 198 LBS | SYSTOLIC BLOOD PRESSURE: 110 MMHG | RESPIRATION RATE: 16 BRPM

## 2024-10-30 DIAGNOSIS — Z98.890 HISTORY OF TRICUSPID VALVE REPAIR: ICD-10-CM

## 2024-10-30 DIAGNOSIS — D68.69 SECONDARY HYPERCOAGULABLE STATE (HCC): ICD-10-CM

## 2024-10-30 DIAGNOSIS — Z95.3 S/P MITRAL VALVE REPLACEMENT WITH BIOPROSTHETIC VALVE: ICD-10-CM

## 2024-10-30 DIAGNOSIS — I48.21 ATRIAL FIBRILLATION, PERMANENT (HCC): ICD-10-CM

## 2024-10-30 DIAGNOSIS — Z95.5 S/P DRUG ELUTING CORONARY STENT PLACEMENT: ICD-10-CM

## 2024-10-30 DIAGNOSIS — Z79.01 CHRONIC ANTICOAGULATION: ICD-10-CM

## 2024-10-30 DIAGNOSIS — I50.22 CHRONIC HFREF (HEART FAILURE WITH REDUCED EJECTION FRACTION) (HCC): ICD-10-CM

## 2024-10-30 DIAGNOSIS — I25.10 CORONARY ARTERY DISEASE, OCCLUSIVE: ICD-10-CM

## 2024-10-30 PROCEDURE — 99213 OFFICE O/P EST LOW 20 MIN: CPT | Performed by: INTERNAL MEDICINE

## 2024-10-30 RX ORDER — LISINOPRIL 2.5 MG/1
2.5 TABLET ORAL DAILY
Qty: 90 TABLET | Refills: 3 | Status: SHIPPED | OUTPATIENT
Start: 2024-10-30

## 2024-10-30 ASSESSMENT — ENCOUNTER SYMPTOMS
COUGH: 0
SHORTNESS OF BREATH: 0
PALPITATIONS: 0
LOSS OF CONSCIOUSNESS: 0
MYALGIAS: 0
DIZZINESS: 0

## 2024-10-30 ASSESSMENT — FIBROSIS 4 INDEX: FIB4 SCORE: 1.45

## 2024-11-21 ENCOUNTER — PATIENT MESSAGE (OUTPATIENT)
Dept: CARDIOLOGY | Facility: MEDICAL CENTER | Age: 74
End: 2024-11-21
Payer: MEDICARE

## 2024-11-21 DIAGNOSIS — Z51.81 ENCOUNTER FOR MONITORING SOTALOL THERAPY: ICD-10-CM

## 2024-11-21 DIAGNOSIS — Z79.899 ENCOUNTER FOR MONITORING SOTALOL THERAPY: ICD-10-CM

## 2024-11-21 RX ORDER — SOTALOL HYDROCHLORIDE 80 MG/1
80 TABLET ORAL 2 TIMES DAILY
Qty: 180 TABLET | Refills: 1 | OUTPATIENT
Start: 2024-11-21

## 2024-11-21 RX ORDER — SOTALOL HYDROCHLORIDE 80 MG/1
80 TABLET ORAL 2 TIMES DAILY
Qty: 180 TABLET | Refills: 1 | Status: SHIPPED | OUTPATIENT
Start: 2024-11-21

## 2024-11-22 ENCOUNTER — PATIENT MESSAGE (OUTPATIENT)
Dept: CARDIOLOGY | Facility: MEDICAL CENTER | Age: 74
End: 2024-11-22
Payer: MEDICARE

## 2024-11-27 ENCOUNTER — PATIENT MESSAGE (OUTPATIENT)
Dept: CARDIOLOGY | Facility: MEDICAL CENTER | Age: 74
End: 2024-11-27
Payer: MEDICARE

## 2024-12-04 NOTE — PROGRESS NOTES
Called Pavan followed up regarding initiation of GDMT with lisinopril which he did not tolerate causing his blood pressure to drop.  Now off of medications blood pressure in the low 100s feeling better.  Has follow-up appointment with [facility] 1/9/2025

## 2025-01-07 ENCOUNTER — PATIENT MESSAGE (OUTPATIENT)
Dept: CARDIOLOGY | Facility: MEDICAL CENTER | Age: 75
End: 2025-01-07
Payer: MEDICARE

## 2025-01-09 ENCOUNTER — NON-PROVIDER VISIT (OUTPATIENT)
Dept: CARDIOLOGY | Facility: MEDICAL CENTER | Age: 75
End: 2025-01-09
Attending: INTERNAL MEDICINE
Payer: MEDICARE

## 2025-01-09 ENCOUNTER — NON-PROVIDER VISIT (OUTPATIENT)
Dept: CARDIOLOGY | Facility: MEDICAL CENTER | Age: 75
End: 2025-01-09

## 2025-01-09 VITALS
OXYGEN SATURATION: 98 % | HEIGHT: 75 IN | RESPIRATION RATE: 14 BRPM | HEART RATE: 75 BPM | BODY MASS INDEX: 25.12 KG/M2 | WEIGHT: 202 LBS | DIASTOLIC BLOOD PRESSURE: 56 MMHG | SYSTOLIC BLOOD PRESSURE: 104 MMHG

## 2025-01-09 DIAGNOSIS — Z95.5 HISTORY OF CORONARY ANGIOPLASTY WITH INSERTION OF STENT: ICD-10-CM

## 2025-01-09 DIAGNOSIS — Z95.810 BIVENTRICULAR ICD (IMPLANTABLE CARDIOVERTER-DEFIBRILLATOR) IN PLACE: ICD-10-CM

## 2025-01-09 DIAGNOSIS — I48.21 HYPERCOAGULABLE STATE DUE TO PERMANENT ATRIAL FIBRILLATION (HCC): ICD-10-CM

## 2025-01-09 DIAGNOSIS — I25.9 ISCHEMIC HEART DISEASE DUE TO CORONARY ARTERY OBSTRUCTION (HCC): ICD-10-CM

## 2025-01-09 DIAGNOSIS — I42.8 NONISCHEMIC CARDIOMYOPATHY (HCC): ICD-10-CM

## 2025-01-09 DIAGNOSIS — I47.20 VENTRICULAR TACHYCARDIA (HCC): ICD-10-CM

## 2025-01-09 DIAGNOSIS — I50.22 CHRONIC HFREF (HEART FAILURE WITH REDUCED EJECTION FRACTION) (HCC): ICD-10-CM

## 2025-01-09 DIAGNOSIS — D68.69 HYPERCOAGULABLE STATE DUE TO PERMANENT ATRIAL FIBRILLATION (HCC): ICD-10-CM

## 2025-01-09 DIAGNOSIS — I24.0 ISCHEMIC HEART DISEASE DUE TO CORONARY ARTERY OBSTRUCTION (HCC): ICD-10-CM

## 2025-01-09 DIAGNOSIS — I48.21 ATRIAL FIBRILLATION, PERMANENT (HCC): ICD-10-CM

## 2025-01-09 DIAGNOSIS — R93.1 ABNORMAL NUCLEAR CARDIAC IMAGING TEST: ICD-10-CM

## 2025-01-09 PROCEDURE — 93284 PRGRMG EVAL IMPLANTABLE DFB: CPT | Performed by: INTERNAL MEDICINE

## 2025-01-09 PROCEDURE — 3074F SYST BP LT 130 MM HG: CPT | Performed by: INTERNAL MEDICINE

## 2025-01-09 PROCEDURE — 3078F DIAST BP <80 MM HG: CPT | Performed by: INTERNAL MEDICINE

## 2025-01-09 PROCEDURE — G2211 COMPLEX E/M VISIT ADD ON: HCPCS | Performed by: INTERNAL MEDICINE

## 2025-01-09 PROCEDURE — 93284 PRGRMG EVAL IMPLANTABLE DFB: CPT | Mod: 26 | Performed by: INTERNAL MEDICINE

## 2025-01-09 PROCEDURE — 99213 OFFICE O/P EST LOW 20 MIN: CPT | Performed by: INTERNAL MEDICINE

## 2025-01-09 PROCEDURE — 99214 OFFICE O/P EST MOD 30 MIN: CPT | Performed by: INTERNAL MEDICINE

## 2025-01-09 RX ORDER — DAPAGLIFLOZIN 10 MG/1
10 TABLET, FILM COATED ORAL DAILY
Qty: 90 TABLET | Refills: 3 | Status: SHIPPED | OUTPATIENT
Start: 2025-01-09

## 2025-01-09 RX ORDER — SPIRONOLACTONE 25 MG/1
25 TABLET ORAL DAILY
Qty: 90 TABLET | Refills: 3 | Status: SHIPPED | OUTPATIENT
Start: 2025-01-09

## 2025-01-09 RX ORDER — ATORVASTATIN CALCIUM 40 MG/1
40 TABLET, FILM COATED ORAL NIGHTLY
Qty: 100 TABLET | Refills: 3 | Status: SHIPPED | OUTPATIENT
Start: 2025-01-09

## 2025-01-09 RX ORDER — CLOPIDOGREL BISULFATE 75 MG/1
75 TABLET ORAL DAILY
Qty: 100 TABLET | Refills: 1 | Status: SHIPPED | OUTPATIENT
Start: 2025-01-09

## 2025-01-09 ASSESSMENT — FIBROSIS 4 INDEX: FIB4 SCORE: 1.67

## 2025-01-09 NOTE — PROGRESS NOTES
Normal device function. All RX pathways programmed B<AX per [employer] recommendations. See flowsheet.

## 2025-01-09 NOTE — CARDIAC REMOTE MONITOR - SCAN
Device transmission reviewed. Device demonstrated appropriate function.       Electronically Signed by: Pavan Alston M.D.    1/14/2025  12:07 PM

## 2025-01-10 ENCOUNTER — PATIENT MESSAGE (OUTPATIENT)
Dept: CARDIOLOGY | Facility: MEDICAL CENTER | Age: 75
End: 2025-01-10
Payer: MEDICARE

## 2025-01-10 DIAGNOSIS — I48.21 ATRIAL FIBRILLATION, PERMANENT (HCC): ICD-10-CM

## 2025-01-10 NOTE — PROGRESS NOTES
Cardiology Initial Consultation Note    Date of note:    1/9/2025    Primary Care Provider: BÁRBARA Gannon  Referring Provider: No ref. provider found    Patient Name: Pavan Cueva   YOB: 1950  MRN:              [id number]    Chief Complaint   Patient presents with    Follow-Up     Chronic HFrEF (heart failure with reduced ejection fraction) (HCC)    Atrial Fibrillation     Atrial fibrillation, permanent (HCC)    Anticoagulation     Chronic anticoagulation       History of Present Illness: Mr. Pavan Cueva is a 74-year-old man with past medical history significant for CAD status post PCI to mid LCx on 7/10/2024, permanent atrial fibrillation status post maze, left atrial appendage ligation, nonischemic cardiomyopathy HFrEF LVEF 25%, mitral valve repair status post redo mitral valve replacement with 33 mm Angeli-Underwood pericardial valve due to dehisced mitral valve ring in 2021, history of tricuspid valve repair, history of ventricular tachycardia maintained on AAD who presents to the office for follow-up.    Patient is known to our practice and a former patient of Dr. Romero.  Has also been actively following with EP cardiology with Dr. Garza given his known history of VT.  He has been maintained on sotalol.  He was previously on amiodarone but this was switched to sotalol given thyroid toxicity.    Recently, in July 2024, he underwent coronary angiogram and was found to have obstructive lesion involving LCx that was intervened on with LEONIE.  He has been doing well overall since then.  Has been maintained on Eliquis given concomitant atrial fibrillation and Plavix.  No major bleeding issues while on these therapies.    Today, he has no major cardiac complaints.  He denies having chest pain or dyspnea.  No lower extremity edema.      Cardiovascular Risk Factors:  1. Smoking status: Denies  2. Type II Diabetes Mellitus: Denies   Lab Results   Component Value Date/Time    HBA1C  5.3 08/19/2024 11:04 AM     3. Hypertension: Yes  4. Dyslipidemia: Yes   Cholesterol,Tot   Date Value Ref Range Status   08/30/2024 108  Final     LDL   Date Value Ref Range Status   08/30/2024 49  Final     HDL   Date Value Ref Range Status   08/30/2024 49  Final     Triglycerides   Date Value Ref Range Status   08/30/2024 42  Final     5. Family history of early Coronary Artery Disease in a first degree relative (Male less than 55 years of age; Female less than 65 years of age): Denies      Review of Systems:  As per HPI.  Review of systems assessed and are negative except as stated above.      Past Medical History:   Diagnosis Date    AF (atrial fibrillation) (Formerly [facility])          Anemia     AV block, complete (Formerly [facility])     Cardiomyopathy, dilated ([facility]ly [facility]) 03/2022    Echocardiogram with severely dilated LV, LVEF 20-25%. Global hypokinesis. Normal RV. Severely dilated LA and RV. MV prothesis with elevated transvalvular gradient. Trace AR, mild TR. RVSP 47mmHg.    Chronic anticoagulation     Congestive heart failure (Formerly [facility]) 8/25/2015    Heart valve disease     Hemorrhagic disorder (Formerly [facility])     History of epistaxis    Hypertension     Myocardial infarct (Formerly [facility]) 12/23/22    On home oxygen therapy     2L at night    ARIANA (obstructive sleep apnea)     Pacemaker 2017    Renal mass     Restrictive lung disease     Sleep apnea     no cpap, 3L O2    Thyroid disease          Past Surgical History:   Procedure Laterality Date    MITRAL VALVE REPLACEMENT  09/08/2021    Redo MVR (#33 Angeli-Underwood Theon pericardial valve) by Dr. Chou, [place], [place]    PACEMAKER INSERTION Left 07/13/2018    Medtronic Dionne XT DR MRI [id number] implanted by Dr. Gillette, [place], [place]    MAZE PROCEDURE  05/2018    TRICUSPID VALVE REPAIR  05/2018    MITRAL VALVE REPAIR  05/2018     MV repair, TV repair, MYRNA ligation, MAZE procedure and biatrial resizing by Dr. Chou at [place], [place].    MITRAL VALVE REPAIR      OTHER      Nasal surgery for  nosebleeds         Current Outpatient Medications   Medication Sig Dispense Refill    Cholecalciferol (VITAMIN D3 PO) Take  by mouth every day.      apixaban (ELIQUIS) 5mg Tab Take 1 Tablet by mouth 2 times a day. 180 Tablet 3    atorvastatin (LIPITOR) 40 MG Tab Take 1 Tablet by mouth every evening. 100 Tablet 3    clopidogrel (PLAVIX) 75 MG Tab Take 1 Tablet by mouth every day. 100 Tablet 1    spironolactone (ALDACTONE) 25 MG Tab Take 1 Tablet by mouth every day. 90 Tablet 3    dapagliflozin propanediol (FARXIGA) 10 MG Tab Take 1 Tablet by mouth every day. 90 Tablet 3    sotalol (BETAPACE) 80 MG Tab Take 1 Tablet by mouth 2 times a day. 180 Tablet 1    lisinopril (PRINIVIL) 2.5 MG Tab Take 1 Tablet by mouth every day. 90 Tablet 3    potassium chloride (MICRO-K) 10 MEQ capsule TAKE 1 CAPSULE BY MOUTH EVERY DAY FOR 6 DAYS PER WEEK SKIP SUNDAYS 77 Capsule 1    levothyroxine (SYNTHROID) 125 MCG Tab TAKE 1 TABLET BY MOUTH EVERY MORNING MORE THAN 30 MINUTES BEFORE BREAKFAST.      Ascorbic Acid (VITAMIN C) 1000 MG Tab Take  by mouth.      Probiotic Product (PROBIOTIC-10) Chew Tab Chew 1 Tablet every day.      ferrous sulfate 325 (65 Fe) MG tablet Take 1 Tablet by mouth every 48 hours.      magnesium oxide (MAG-OX) 400 MG Tab tablet Take 1 tablet by mouth every day.      Multiple Vitamin (MULTI-VITAMINS) Tab Take 1 tablet by mouth every day.       No current facility-administered medications for this visit.         No Known Allergies      Family History   Problem Relation Age of Onset    Cancer Mother     Stroke Father         CVA at 85, afib    Heart Disease Father          Social History     Socioeconomic History    Marital status:      Spouse name: Not on file    Number of children: Not on file    Years of education: Not on file    Highest education level: Not on file   Occupational History    Not on file   Tobacco Use    Smoking status: Former     Current packs/day: 0.00     Types: Cigarettes     Start date:  "1970     Quit date: 1987     Years since quittin.0    Smokeless tobacco: Never   Vaping Use    Vaping status: Never Used   Substance and Sexual Activity    Alcohol use: Not Currently    Drug use: Not Currently    Sexual activity: Not on file   Other Topics Concern    Not on file   Social History Narrative    Not on file     Social Drivers of Health     Financial Resource Strain: Not on file   Food Insecurity: Not on file   Transportation Needs: Not on file   Physical Activity: Not on file   Stress: Not on file   Social Connections: Not on file   Intimate Partner Violence: Not on file   Housing Stability: Not on file         Physical Exam:  Ambulatory Vitals  /56 (BP Location: Left arm, Patient Position: Sitting, BP Cuff Size: Adult)   Pulse 75   Resp 14   Ht 1.905 m (6' 3\")   Wt 91.6 kg (202 lb)   SpO2 98%    Oxygen Therapy:  Pulse Oximetry: 98 %  BP Readings from Last 4 Encounters:   25 104/56   10/30/24 110/60   24 110/66   07/10/24 104/68       Weight/BMI: Body mass index is 25.25 kg/m².  Wt Readings from Last 4 Encounters:   25 91.6 kg (202 lb)   10/30/24 89.8 kg (198 lb)   24 90.3 kg (199 lb)   07/10/24 90.3 kg (199 lb 1.2 oz)         General: Not in acute distress, appears comfortable  HEENT: OP clear   Neck:  No carotid bruits, No JVD appreciated  CVS:  RRR, Normal S1, S2. No murmurs, rubs or gallops  Resp: Normal respiratory effort, lungs CTA bilaterally  Abdomen: Soft, non-distended  Neurological: Alert and oriented x3, moves all extremities, no focal neurologic deficits  Psychiatric: Appropriate affect  Extremities:   Extremities warm, 2+ bilateral radial pulses.  2+ bilateral dp pulses, no lower extremity edema bilaterally      Lab Data Review:  Lab Results   Component Value Date/Time    CHOLSTRLTOT 108 2024 12:00 AM    LDL 49 2024 12:00 AM    HDL 49 2024 12:00 AM    TRIGLYCERIDE 42 2024 12:00 AM       Lab Results   Component Value " Date/Time    SODIUM 137 07/08/2024 09:01 AM    POTASSIUM 4.8 07/08/2024 09:01 AM    CHLORIDE 104 07/08/2024 09:01 AM    CO2 26 07/08/2024 09:01 AM    GLUCOSE 90 07/08/2024 09:01 AM    BUN 18 07/08/2024 09:01 AM    CREATININE 0.83 07/08/2024 09:01 AM    BUNCREATRAT 18 03/01/2022 04:47 AM    GLOMRATE 77 05/02/2023 12:12 PM     Lab Results   Component Value Date/Time    ALKPHOSPHAT 52 07/08/2024 09:01 AM    ASTSGOT 13 07/08/2024 09:01 AM    ALTSGPT 13 07/08/2024 09:01 AM    TBILIRUBIN 0.8 07/08/2024 09:01 AM      Lab Results   Component Value Date/Time    WBC 4.7 (L) 07/08/2024 09:01 AM    HEMOGLOBIN 12.4 (L) 12/18/2024 11:14 AM    HEMOGLOBIN 13.1 (L) 07/08/2024 09:01 AM     Lab Results   Component Value Date/Time    HBA1C 5.3 08/19/2024 11:04 AM         Cardiac Imaging and Procedures Review:    EKG dated 7/10/2024:   My personal interpretation is v-paced rhythm    Echo dated 1/12/2024:   Severely reduced left ventricular systolic function. The left   ventricular ejection fraction is visually estimated to be 20-25%.   Global hypokinesis.   The right ventricle is dilated. Reduced right ventricular systolic function.   Biatrial enlargement.   Known mitral valve bioprosthesis which is functioning normally with   appropriate transvalvular gradient.  Right ventricular systolic pressure is estimated to be 25 mmHg   (normal).   Compared to the prior study on 5/5/23, there are no significant   changes.     Cardiac Cath 7/10/2024:  Hemodynamics:   Aorta: 95/52 mmHg  LVEDP: 18 mmHg  No significant pullback gradient across the aortic valve     Coronary Anatomy              Left Main: Short Normal              LAD: Minimal luminal irregularities              LCx:  80% mid stenosis              RCA: Dominant, High anterior takeoff. Minimal luminal irregularities      Assessment & Plan     1. Ischemic heart disease due to coronary artery obstruction (HCC)        2. History of coronary angioplasty with insertion of stent        3.  Nonischemic cardiomyopathy ([facility])        4. Chronic HFrEF (heart failure with reduced ejection fraction) ([facility])  clopidogrel (PLAVIX) 75 MG Tab    spironolactone (ALDACTONE) 25 MG Tab    dapagliflozin propanediol (FARXIGA) 10 MG Tab      5. Ventricular tachycardia (HCC)        6. Biventricular ICD (implantable cardioverter-defibrillator) in place  spironolactone (ALDACTONE) 25 MG Tab      7. Atrial fibrillation, permanent ([facility])  apixaban (ELIQUIS) 5mg Tab    spironolactone (ALDACTONE) 25 MG Tab      8. Hypercoagulable state due to permanent atrial fibrillation ([facility])        9. Abnormal nuclear cardiac imaging test  atorvastatin (LIPITOR) 40 MG Tab            Medical Decision Making:  Mr. Pavan Cueva is a 74-year-old man with past medical history significant for CAD status post PCI to mid LCx on 7/10/2024, permanent atrial fibrillation status post maze, left atrial appendage ligation, nonischemic cardiomyopathy HFrEF LVEF 25%, mitral valve repair status post redo mitral valve replacement with 33 mm Angeli-Underwood pericardial valve due to dehisced mitral valve ring in 2021, history of tricuspid valve repair, history of ventricular tachycardia maintained on AAD who presents to the office for follow-up.    1. Ischemic heart disease due to coronary artery obstruction ([facility])  2. History of coronary angioplasty with insertion of stent  -Found to have isolated one-vessel CAD involving LCx.  This would not explain the degree of his cardiomyopathy/LV dysfunction.  Had successful PCI involving LCx.  He will be continued on antiplatelet/anticoagulation with Eliquis and Plavix x 1 year.  Plavix will eventually be discontinued after 1 year..  -Continue aggressive lipid-lowering therapy with intensity atorvastatin 40 mg daily.    3. Nonischemic cardiomyopathy ([facility])  4. Chronic HFrEF (heart failure with reduced ejection fraction) ([facility])  -History of nonischemic cardiomyopathy, LVEF 20 to 25%.  NYHA class I  -s/p CRT-D  -He is  stable and well compensated.  No evidence of volume overload.  -We will increase his Farxiga to 10 mg daily  -Continue GDMT with beta-blocker therapy, lisinopril and spironolactone.  Has had issues with hypotension with alternative therapy such as Entresto.    5. Ventricular tachycardia (HCC)  6. Biventricular ICD (implantable cardioverter-defibrillator) in place  -Known history of ventricular tachycardia.  Managed by EP.  Currently on sotalol 80 mg twice daily.  Continue.    7. Atrial fibrillation, permanent (HCC)  8. Hypercoagulable state due to permanent atrial fibrillation (HCC)  -Permanent AF maintained on Eliquis.  Continue Eliquis 5 mg twice daily.  No major bleeding issues.    () Today's E/M visit is associated with medical care services that serve as the continuing focal point for all needed health care services and/or with medical care services that  are part of ongoing care related to a patient's single, serious condition, or a complex condition: This includes  furnishing services to patients on an ongoing basis that result in care that is personalized  to the patient. The services result in a comprehensive, longitudinal, and continuous  relationship with the patient and involve delivery of team-based care that is accessible, coordinated with other practitioners and providers, and integrated with the broader health care landscape.       It was a pleasure seeing Mr. Pavan Cueva in the office today. Return in about 6 months (around 7/9/2025) for Coronary artery disease, Atrial fibrillation. Patient is aware to call the cardiology clinic with any questions or concerns.      Kyrie Lama MD, [facility]  Cardiologist, [facility], [facility]  [address] [address]  Phone: [phone]  Fax: [phone]    Please note that this dictation was created using voice recognition software. I have made every reasonable attempt to  correct obvious errors, but it is possible there are errors of grammar and possibly content that I did not discover before finalizing the note.

## 2025-01-13 ENCOUNTER — TELEPHONE (OUTPATIENT)
Dept: CARDIOLOGY | Facility: MEDICAL CENTER | Age: 75
End: 2025-01-13
Payer: MEDICARE

## 2025-01-13 PROCEDURE — RXMED WILLOW AMBULATORY MEDICATION CHARGE: Performed by: INTERNAL MEDICINE

## 2025-01-14 NOTE — PATIENT COMMUNICATION
Philip Upton Jr.  You1 hour ago (3:31 PM)     RO  Hello,    I called [facility] in [place] and provided them with the correct Fixet[employer] jud information, which reduces the patient's copay to $0.00. The patient also inquired about his Eliquis, and I offered him the 340B pricing, which he has agreed to.    Could you please have a new prescription sent to our specialty pharmacy on [address] ?    Thank you for your assistance.

## 2025-01-14 NOTE — TELEPHONE ENCOUNTER
Received [employer] request via  Nurse susana   for apixaban (ELIQUIS) 5mg Tab . (Quantity:180, Day Supply:90)     Insurance: Medicare D [employer]  Member ID:  [id number]  BIN: [id number]  PCN: [id number]  Group: [id number]     Ran Test claim via [employer] & medication  copay at preferred pharmacy is to high. Message the nurse for assistance and I offered 340b. He is okay with 179.16 for 3 months

## 2025-01-15 ENCOUNTER — PHARMACY VISIT (OUTPATIENT)
Dept: PHARMACY | Facility: MEDICAL CENTER | Age: 75
End: 2025-01-15
Payer: COMMERCIAL

## 2025-01-27 ENCOUNTER — NON-PROVIDER VISIT (OUTPATIENT)
Dept: CARDIOLOGY | Facility: MEDICAL CENTER | Age: 75
End: 2025-01-27
Payer: MEDICARE

## 2025-01-27 PROCEDURE — 93295 DEV INTERROG REMOTE 1/2/MLT: CPT | Performed by: INTERNAL MEDICINE

## 2025-01-27 NOTE — CARDIAC REMOTE MONITOR - SCAN
Device transmission reviewed. Device demonstrated appropriate function.       Electronically Signed by: Pavan Alston M.D.    1/27/2025  4:34 PM    
,

## 2025-03-11 DIAGNOSIS — Z79.899 HIGH RISK MEDICATION USE: ICD-10-CM

## 2025-03-11 RX ORDER — POTASSIUM CHLORIDE 750 MG/1
CAPSULE, EXTENDED RELEASE ORAL
Qty: 90 CAPSULE | Refills: 1 | Status: SHIPPED | OUTPATIENT
Start: 2025-03-11

## 2025-03-11 NOTE — TELEPHONE ENCOUNTER
Is the patient due for a refill? Yes    Was the patient seen the last 15 months? Yes    Date of last office visit: 01/09/2025    Does the patient have an upcoming appointment?  No    Provider to refill:MK     Does the patient have [facility] and need 100-day supply? (This applies to ALL medications) Patient does not have SCP

## 2025-03-20 ENCOUNTER — PHARMACY VISIT (OUTPATIENT)
Dept: PHARMACY | Facility: MEDICAL CENTER | Age: 75
End: 2025-03-20
Payer: COMMERCIAL

## 2025-03-20 PROCEDURE — RXMED WILLOW AMBULATORY MEDICATION CHARGE: Performed by: INTERNAL MEDICINE

## 2025-05-04 ENCOUNTER — NON-PROVIDER VISIT (OUTPATIENT)
Dept: CARDIOLOGY | Facility: MEDICAL CENTER | Age: 75
End: 2025-05-04
Payer: MEDICARE

## 2025-05-05 NOTE — CARDIAC REMOTE MONITOR - SCAN
Device transmission reviewed. Device demonstrated appropriate function.       Electronically Signed by: Jesus Garza M.D.    5/20/2025  10:45 AM

## 2025-06-10 PROCEDURE — RXMED WILLOW AMBULATORY MEDICATION CHARGE: Performed by: INTERNAL MEDICINE

## 2025-06-12 ENCOUNTER — PHARMACY VISIT (OUTPATIENT)
Dept: PHARMACY | Facility: MEDICAL CENTER | Age: 75
End: 2025-06-12
Payer: COMMERCIAL

## 2025-06-26 ENCOUNTER — TELEPHONE (OUTPATIENT)
Dept: CARDIOLOGY | Facility: MEDICAL CENTER | Age: 75
End: 2025-06-26
Payer: MEDICARE

## 2025-06-26 DIAGNOSIS — Z95.5 HISTORY OF CORONARY ANGIOPLASTY WITH INSERTION OF STENT: ICD-10-CM

## 2025-06-26 DIAGNOSIS — I50.22 CHRONIC HFREF (HEART FAILURE WITH REDUCED EJECTION FRACTION) (HCC): ICD-10-CM

## 2025-06-26 DIAGNOSIS — I25.10 CORONARY ARTERY DISEASE, OCCLUSIVE: Primary | ICD-10-CM

## 2025-06-26 RX ORDER — CLOPIDOGREL BISULFATE 75 MG/1
75 TABLET ORAL DAILY
Qty: 90 TABLET | Refills: 0 | Status: SHIPPED | OUTPATIENT
Start: 2025-06-26

## 2025-06-26 NOTE — TELEPHONE ENCOUNTER
Noted MK response/recommendations. See below.   Kyrie FORD M.D. to Me (Selected Message)  MARGY  6/26/25 11:16 AM  Okay to refill

## 2025-06-26 NOTE — TELEPHONE ENCOUNTER
Chart reviewed re: plavix     Last OV 1/9/25 MARGY RTC 6 months  FV None  ============================    To MK please review and advise. Per last OV notes (1/9/25) pt was to continue plavix for a year.  Started 7/10/25.  Do you want to continue? Thank you.  ==========================    To schedulers please assist with scheduling pt for a FV. Thank you

## 2025-06-26 NOTE — TELEPHONE ENCOUNTER
Is the patient due for a refill? Yes    Was the patient seen the last 15 months? Yes    Date of last office visit: 01/09/25    Does the patient have an upcoming appointment?  No       Provider to refill: MK    Does the patient have [facility] Plus and need 100-day supply? (This applies to ALL medications) Patient does not have SCP

## 2025-07-07 DIAGNOSIS — Z51.81 ENCOUNTER FOR MONITORING SOTALOL THERAPY: ICD-10-CM

## 2025-07-07 DIAGNOSIS — Z79.899 ENCOUNTER FOR MONITORING SOTALOL THERAPY: ICD-10-CM

## 2025-07-07 RX ORDER — SOTALOL HYDROCHLORIDE 80 MG/1
80 TABLET ORAL 2 TIMES DAILY
Qty: 180 TABLET | Refills: 1 | Status: CANCELLED | OUTPATIENT
Start: 2025-07-07

## 2025-07-08 ENCOUNTER — PATIENT MESSAGE (OUTPATIENT)
Dept: CARDIOLOGY | Facility: MEDICAL CENTER | Age: 75
End: 2025-07-08
Payer: MEDICARE

## 2025-07-08 DIAGNOSIS — Z79.899 ENCOUNTER FOR MONITORING SOTALOL THERAPY: ICD-10-CM

## 2025-07-08 DIAGNOSIS — Z51.81 ENCOUNTER FOR MONITORING SOTALOL THERAPY: ICD-10-CM

## 2025-07-08 DIAGNOSIS — Z95.810 BIVENTRICULAR ICD (IMPLANTABLE CARDIOVERTER-DEFIBRILLATOR) IN PLACE: ICD-10-CM

## 2025-07-08 DIAGNOSIS — I47.20 VENTRICULAR TACHYCARDIA (HCC): Primary | ICD-10-CM

## 2025-07-08 RX ORDER — SOTALOL HYDROCHLORIDE 80 MG/1
80 TABLET ORAL 2 TIMES DAILY
Qty: 180 TABLET | Refills: 0 | Status: SHIPPED | OUTPATIENT
Start: 2025-07-08

## 2025-07-08 NOTE — TELEPHONE ENCOUNTER
Received request via: Patient    Was the patient seen in the last year in this department? Yes    Does the patient have an active prescription (recently filled or refills available) for medication(s) requested? Yes. Refill request has been refused in Epic. Contacted pharmacy and called in most recent prescription.    Pharmacy Name: [facility]     Does the patient have snf Plus and need 100-day supply? (This applies to ALL medications) Patient does not have SCP

## 2025-07-08 NOTE — TELEPHONE ENCOUNTER
Chart reviewed re:sotalol started 5/18/23    Last OV 1/9/25 MK  FV 8/1/25    Labs/tests EKG 7/10/24                    BMP                    Magnesium       Per protocol 90 day courtesy refill   BMP/Mag labs ordered  Note on 8/1/25 appt info need to complete EKG   ================================  Phone Number Called: [phone]     Call outcome: Spoke to patient regarding message below.    Message: Called to update on need for labs to monitor solatol    Pt verbalized understanding

## 2025-07-11 NOTE — PATIENT COMMUNICATION
Noted pt MyChart message   =====================================  To MK to review and advise with any recommendations/concerns

## 2025-07-15 NOTE — PATIENT COMMUNICATION
Kyrie FORD M.D. to Lexi Traylor R.N. (Selected Message)  MK      7/14/25  4:22 PM  Okay to stop taking lisinopril     MK  ===============================================

## 2025-07-17 ENCOUNTER — RESULTS FOLLOW-UP (OUTPATIENT)
Dept: CARDIOLOGY | Facility: MEDICAL CENTER | Age: 75
End: 2025-07-17
Payer: MEDICARE

## 2025-07-17 DIAGNOSIS — Z51.81 ENCOUNTER FOR MONITORING SOTALOL THERAPY: ICD-10-CM

## 2025-07-17 DIAGNOSIS — Z79.899 ENCOUNTER FOR MONITORING SOTALOL THERAPY: ICD-10-CM

## 2025-07-17 DIAGNOSIS — Z95.810 BIVENTRICULAR ICD (IMPLANTABLE CARDIOVERTER-DEFIBRILLATOR) IN PLACE: ICD-10-CM

## 2025-07-17 DIAGNOSIS — I47.20 VENTRICULAR TACHYCARDIA (HCC): ICD-10-CM

## 2025-08-01 ENCOUNTER — APPOINTMENT (OUTPATIENT)
Facility: MEDICAL CENTER | Age: 75
End: 2025-08-01
Attending: INTERNAL MEDICINE
Payer: MEDICARE

## 2025-08-03 DIAGNOSIS — R93.1 ABNORMAL NUCLEAR CARDIAC IMAGING TEST: ICD-10-CM

## 2025-08-04 RX ORDER — ATORVASTATIN CALCIUM 40 MG/1
40 TABLET, FILM COATED ORAL NIGHTLY
Qty: 100 TABLET | Refills: 3 | OUTPATIENT
Start: 2025-08-04

## 2025-08-10 ENCOUNTER — NON-PROVIDER VISIT (OUTPATIENT)
Dept: CARDIOLOGY | Facility: MEDICAL CENTER | Age: 75
End: 2025-08-10
Payer: MEDICARE

## 2025-08-10 PROCEDURE — 93295 DEV INTERROG REMOTE 1/2/MLT: CPT | Performed by: STUDENT IN AN ORGANIZED HEALTH CARE EDUCATION/TRAINING PROGRAM

## 2025-09-03 ENCOUNTER — APPOINTMENT (OUTPATIENT)
Dept: CARDIOLOGY | Facility: PHYSICIAN GROUP | Age: 75
End: 2025-09-03
Payer: MEDICARE